# Patient Record
Sex: FEMALE | Race: BLACK OR AFRICAN AMERICAN | Employment: OTHER | URBAN - METROPOLITAN AREA
[De-identification: names, ages, dates, MRNs, and addresses within clinical notes are randomized per-mention and may not be internally consistent; named-entity substitution may affect disease eponyms.]

---

## 2017-03-27 RX ORDER — AMLODIPINE BESYLATE 10 MG/1
TABLET ORAL
Qty: 90 TAB | Refills: 1 | OUTPATIENT
Start: 2017-03-27

## 2017-03-27 RX ORDER — VALSARTAN AND HYDROCHLOROTHIAZIDE 160; 25 MG/1; MG/1
TABLET ORAL
Qty: 90 TAB | Refills: 0 | OUTPATIENT
Start: 2017-03-27

## 2017-03-27 NOTE — TELEPHONE ENCOUNTER
Requested Prescriptions     Pending Prescriptions Disp Refills    valsartan-hydroCHLOROthiazide (DIOVAN-HCT) 160-25 mg per tablet [Pharmacy Med Name: VALSARTAN/HCTZ 160MG/25MG TABLETS] 90 Tab 0     Sig: TAKE 1 TABLET BY MOUTH DAILY FOR HIGH BLOOD PRESSURE    amLODIPine (NORVASC) 10 mg tablet 90 Tab 1     Sig: TAKE 1 TABLET BY MOUTH EVERY DAY       Patient is completely out of medication

## 2017-03-29 NOTE — TELEPHONE ENCOUNTER
Received call from pt in ref to her refill request. I advised pt that I did see request, but unfortunately Dr Alexa Scott MD is out of the office today. I advised that I could send this request to another provider but couldn't guarantee that this request would be honored since last office visit with us was in May 2016 and she was due to follow up in 1 month and no showed for appt. Pt then asked if she could come in for office visit. Pt was scheduled for office visit on 3/30/17.

## 2017-03-30 ENCOUNTER — OFFICE VISIT (OUTPATIENT)
Dept: FAMILY MEDICINE CLINIC | Age: 67
End: 2017-03-30

## 2017-03-30 VITALS
DIASTOLIC BLOOD PRESSURE: 82 MMHG | TEMPERATURE: 97.2 F | HEART RATE: 85 BPM | BODY MASS INDEX: 22.88 KG/M2 | SYSTOLIC BLOOD PRESSURE: 135 MMHG | HEIGHT: 68 IN | RESPIRATION RATE: 20 BRPM | OXYGEN SATURATION: 98 % | WEIGHT: 151 LBS

## 2017-03-30 DIAGNOSIS — I10 ESSENTIAL HYPERTENSION WITH GOAL BLOOD PRESSURE LESS THAN 140/90: Chronic | ICD-10-CM

## 2017-03-30 DIAGNOSIS — Z12.39 BREAST CANCER SCREENING: ICD-10-CM

## 2017-03-30 DIAGNOSIS — I10 ESSENTIAL HYPERTENSION WITH GOAL BLOOD PRESSURE LESS THAN 140/90: Primary | Chronic | ICD-10-CM

## 2017-03-30 RX ORDER — VALSARTAN AND HYDROCHLOROTHIAZIDE 160; 25 MG/1; MG/1
1 TABLET ORAL DAILY
Qty: 90 TAB | Refills: 1 | Status: SHIPPED | OUTPATIENT
Start: 2017-03-30 | End: 2017-10-31 | Stop reason: SDUPTHER

## 2017-03-30 RX ORDER — AMLODIPINE BESYLATE 10 MG/1
TABLET ORAL
Qty: 90 TAB | Refills: 1 | Status: SHIPPED | OUTPATIENT
Start: 2017-03-30 | End: 2017-10-31 | Stop reason: SDUPTHER

## 2017-03-30 NOTE — PROGRESS NOTES
Chronic Illness Visit    Today's Date:  3/30/2017   Patient's Name: Edilson Connelly   Patient's :  1950     History:     Chief Complaint   Patient presents with    Follow-up     HTN    Medication Refill     Valsartan and Amlodipine        Edilson Connelly is a 61 y.o. female presenting for a follow up of Chronic Illness. Hypertension    BP today is at goal and less [id number]. Patients risk factors include: denies  Patient is checking BP and they range from 's. Reports compliance and denies side effects to medications  Daily exercise and diet are as follows: reports eating a low salt diet w/ lots of fruits and veggies. Drinks about 8 glasses of water daily. Weight is stable since the last visit. Takes the below meds regularly with no side effects. Not taking daily ASA 81 mg. Last LDL: unknown    Past Medical History:   Diagnosis Date    Hypertension      Past Surgical History:   Procedure Laterality Date    EXTRACTION ERUPTED TOOTH/EXR      HX  SECTION       Social History     Social History    Marital status: SINGLE     Spouse name: N/A    Number of children: N/A    Years of education: N/A     Social History Main Topics    Smoking status: Never Smoker    Smokeless tobacco: Never Used    Alcohol use No    Drug use: No    Sexual activity: No     Other Topics Concern    None     Social History Narrative     Family History   Problem Relation Age of Onset    Hypertension Mother     Hypertension Father     Hypertension Brother     Hypertension Brother     Kidney Disease Brother      No Known Allergies    Problem List:      Patient Active Problem List   Diagnosis Code    Essential hypertension with goal blood pressure less than 140/90 I10       Medications:     Current Outpatient Prescriptions   Medication Sig    amLODIPine (NORVASC) 10 mg tablet TAKE 1 TABLET BY MOUTH EVERY DAY    valsartan-hydrochlorothiazide (DIOVAN HCT) 160-25 mg per tablet Take 1 Tab by mouth daily. Indications: HYPERTENSION    clindamycin (CLEOCIN) 150 mg capsule Take  by mouth every six (6) hours.  acetaminophen-codeine (TYLENOL #3) 300-30 mg per tablet Take 1 Tab by mouth every four (4) hours as needed for Pain. No current facility-administered medications for this visit. Constitutional: negative for fevers, chills, sweats and fatigue  Respiratory: negative for cough, sputum or wheezing  Cardiovascular: negative for chest pain, chest pressure/discomfort, dyspnea  Gastrointestinal: negative for nausea, vomiting, diarrhea, constipation and abdominal pain  Musculoskeletal:negative for myalgias and arthralgias  Neurological: negative for headaches and dizziness  Behavioral/Psych: negative for anxiety and depression    Physical Assessment:   VS:    Vitals:    03/30/17 1245   BP: 135/82   Pulse: 85   Resp: 20   Temp: 97.2 °F (36.2 °C)   TempSrc: Oral   SpO2: 98%   Weight: 151 lb (68.5 kg)   Height: 5' 8\" (1.727 m)       Lab Results   Component Value Date/Time    Sodium 142 07/16/2015 02:25 PM    Potassium 3.6 07/16/2015 02:25 PM    Chloride 104 07/16/2015 02:25 PM    CO2 28 07/16/2015 02:25 PM    Anion gap 10 07/16/2015 02:25 PM    Glucose 101 07/16/2015 02:25 PM    BUN 12 07/16/2015 02:25 PM    Creatinine 0.37 07/16/2015 02:25 PM    BUN/Creatinine ratio 32 07/16/2015 02:25 PM    GFR est AA >60 07/16/2015 02:25 PM    GFR est non-AA >60 07/16/2015 02:25 PM    Calcium 9.2 07/16/2015 02:25 PM       General:   Well-groomed, well-nourished, in no distress, pleasant, alert, appropriate and conversant. Mouth:  Good dentition, oropharynx WNL without membranes, exudates, petechiae or ulcers  Cardiovasc:   RRR, no MRG. Pulses 2+ and symmetric at distal extremities. Pulmonary:   Lungs clear bilaterally. Normal respiratory effort. Extremities:   No edema, no TTP bilateral calves. LEs warm and well-perfused. Neuro:   Alert and oriented, no focal deficits. No facial asymmetry noted.   Skin:    Small patch of itchy flaky skin between the breast  Psych:  No pressured speech or abnormal thought content        Assessment/Plan & Orders:       1. Essential hypertension with goal blood pressure less than 140/90    2. Breast cancer screening        Orders Placed This Encounter    CHELA MAMMO BI SCREENING INCL CAD    LIPID PANEL    METABOLIC PANEL, COMPREHENSIVE       Hypertension BP at goal will continue with current treatment plan. CMP and lipid panel ordered.      Follow up in 6 months    Verdis Mortimer, MD  Family Medicine  3/30/2017  12:34 PM

## 2017-03-30 NOTE — MR AVS SNAPSHOT
Visit Information Date & Time Provider Department Dept. Phone Encounter #  
 3/30/2017 12:45 PM Alvena Klinefelter, [facility] [phone] [id number] Follow-up Instructions Return in about 6 months (around 9/30/2017). Your Appointments 10/2/2017  9:00 AM  
FOLLOW UP EXAM with Alvena Klinefelter, MD  
Formerly [facility]) Appt Note: 6 month f/u  
 [address] [facility] [id number]  
[facility] [id number] Upcoming Health Maintenance Date Due Hepatitis C Screening 1950 DTaP/Tdap/Td series (1 - Tdap) 6/6/1973 ZOSTER VACCINE AGE 60> 6/6/2012 BREAST CANCER SCRN MAMMOGRAM 1/1/2017 PAP AKA CERVICAL CYTOLOGY 1/1/2017 FOBT Q 1 YEAR AGE 50-75 1/25/2018 Allergies as of 3/30/2017  Review Complete On: 3/30/2017 By: Alvena Klinefelter, MD  
 No Known Allergies Current Immunizations  Never Reviewed No immunizations on file. Not reviewed this visit You Were Diagnosed With   
  
 Codes Comments Essential hypertension with goal blood pressure less than 140/90    -  Primary ICD-10-CM: I10 
ICD-9-CM: 401.9 Breast cancer screening     ICD-10-CM: Z12.39 
ICD-9-CM: V76.10 Vitals BP Pulse Temp Resp Height(growth percentile) Weight(growth percentile) 135/82 85 97.2 °F (36.2 °C) (Oral) 20 5' 8\" (1.727 m) 151 lb (68.5 kg) SpO2 BMI OB Status Smoking Status 98% 22.96 kg/m2 Postmenopausal Never Smoker Vitals History BMI and BSA Data Body Mass Index Body Surface Area  
 22.96 kg/m 2 1.81 m 2 Preferred Pharmacy Pharmacy Name Phone [address] [facility] [phone]  [facility] [phone] Your Updated Medication List  
  
   
This list is accurate as of: 3/30/17  1:10 PM.  Always use your most recent med list.  
  
  
  
  
 acetaminophen-codeine 300-30 mg per tablet Commonly known as:  TYLENOL #3 Take 1 Tab by mouth every four (4) hours as needed for Pain. amLODIPine 10 mg tablet Commonly known as:  Cyclone Citron TAKE 1 TABLET BY MOUTH EVERY DAY  
  
 clindamycin 150 mg capsule Commonly known as:  CLEOCIN Take  by mouth every six (6) hours. valsartan-hydroCHLOROthiazide 160-25 mg per tablet Commonly known as:  DIOVAN HCT Take 1 Tab by mouth daily. Indications: HYPERTENSION Follow-up Instructions Return in about 6 months (around 9/30/2017). To-Do List   
 03/30/2017 Lab:  LIPID PANEL   
  
 03/30/2017 Imaging:  CHELA MAMMO BI SCREENING INCL CAD   
  
 03/30/2017 Lab:  METABOLIC PANEL, COMPREHENSIVE Patient Instructions High Blood Pressure: Care Instructions Your Care Instructions If your blood pressure is usually above 140/90, you have high blood pressure, or hypertension. That means the top number is 140 or higher or the bottom number is 90 or higher, or both. Despite what a lot of people think, high blood pressure usually doesn't cause headaches or make you feel dizzy or lightheaded. It usually has no symptoms. But it does increase your risk for heart attack, stroke, and kidney or eye damage. The higher your blood pressure, the more your risk increases. Your doctor will give you a goal for your blood pressure. Your goal will be based on your health and your age. An example of a goal is to keep your blood pressure below 140/90. Lifestyle changes, such as eating healthy and being active, are always important to help lower blood pressure. You might also take medicine to reach your blood pressure goal. 
Follow-up care is a key part of your treatment and safety. Be sure to make and go to all appointments, and call your doctor if you are having problems. It's also a good idea to know your test results and keep a list of the medicines you take. How can you care for yourself at home? Medical treatment · If you stop taking your medicine, your blood pressure will go back up. You may take one or more types of medicine to lower your blood pressure. Be safe with medicines. Take your medicine exactly as prescribed. Call your doctor if you think you are having a problem with your medicine. · Talk to your doctor before you start taking aspirin every day. Aspirin can help certain people lower their risk of a heart attack or stroke. But taking aspirin isn't right for everyone, because it can cause serious bleeding. · See your doctor regularly. You may need to see the doctor more often at first or until your blood pressure comes down. · If you are taking blood pressure medicine, talk to your doctor before you take decongestants or anti-inflammatory medicine, such as ibuprofen. Some of these medicines can raise blood pressure. · Learn how to check your blood pressure at home. Lifestyle changes · Stay at a healthy weight. This is especially important if you put on weight around the waist. Losing even 10 pounds can help you lower your blood pressure. · If your doctor recommends it, get more exercise. Walking is a good choice. Bit by bit, increase the amount you walk every day. Try for at least 30 minutes on most days of the week. You also may want to swim, bike, or do other activities. · Avoid or limit alcohol. Talk to your doctor about whether you can drink any alcohol. · Try to limit how much sodium you eat to less than 2,300 milligrams (mg) a day. Your doctor may ask you to try to eat less than 1,500 mg a day. · Eat plenty of fruits (such as bananas and oranges), vegetables, legumes, whole grains, and low-fat dairy products. · Lower the amount of saturated fat in your diet. Saturated fat is found in animal products such as milk, cheese, and meat. Limiting these foods may help you lose weight and also lower your risk for heart disease. · Do not smoke. Smoking increases your risk for heart attack and stroke. If you need help quitting, talk to your doctor about stop-smoking programs and medicines. These can increase your chances of quitting for good. When should you call for help? Call 911 anytime you think you may need emergency care. This may mean having symptoms that suggest that your blood pressure is causing a serious heart or blood vessel problem. Your blood pressure may be over 180/110. For example, call 911 if: 
· You have symptoms of a heart attack. These may include: ¨ Chest pain or pressure, or a strange feeling in the chest. 
¨ Sweating. ¨ Shortness of breath. ¨ Nausea or vomiting. ¨ Pain, pressure, or a strange feeling in the back, neck, jaw, or upper belly or in one or both shoulders or arms. ¨ Lightheadedness or sudden weakness. ¨ A fast or irregular heartbeat. · You have symptoms of a stroke. These may include: 
¨ Sudden numbness, tingling, weakness, or loss of movement in your face, arm, or leg, especially on only one side of your body. ¨ Sudden vision changes. ¨ Sudden trouble speaking. ¨ Sudden confusion or trouble understanding simple statements. ¨ Sudden problems with walking or balance. ¨ A sudden, severe headache that is different from past headaches. · You have severe back or belly pain. Do not wait until your blood pressure comes down on its own. Get help right away. Call your doctor now or seek immediate care if: 
· Your blood pressure is much higher than normal (such as 180/110 or higher), but you don't have symptoms. · You think high blood pressure is causing symptoms, such as: ¨ Severe headache. ¨ Blurry vision. Watch closely for changes in your health, and be sure to contact your doctor if: 
· Your blood pressure measures 140/90 or higher at least 2 times. That means the top number is 140 or higher or the bottom number is 90 or higher, or both. · You think you may be having side effects from your blood pressure medicine. · Your blood pressure is usually normal, but it goes above normal at least 2 times. Where can you learn more? Go to http://hugo-pee.info/. Enter S714 in the search box to learn more about \"High Blood Pressure: Care Instructions. \" Current as of: August 8, 2016 Content Version: 11.2 © 3473-9958 [employer]. Care instructions adapted under license by [employer] (which disclaims liability or warranty for this information). If you have questions about a medical condition or this instruction, always ask your healthcare professional. [address] any warranty or liability for your use of this information. Introducing [facility]! Padmini Carroll introduces Rayspan patient portal. Now you can access parts of your medical record, email your doctor's office, and request medication refills online. 1. In your internet browser, go to [email or web address]. [employer]/[facility] 2. Click on the First Time User? Click Here link in the Sign In box. You will see the New Member Sign Up page. 3. Enter your Rayspan Access Code exactly as it appears below. You will not need to use this code after youve completed the sign-up process. If you do not sign up before the expiration date, you must request a new code. · Rayspan Access Code: [email or web address] Expires: 6/28/2017 12:28 PM 
 
4. Enter the last four digits of your Social Security Number (xxxx) and Date of Birth (mm/dd/yyyy) as indicated and click Submit. You will be taken to the next sign-up page. 5. Create a Rayspan ID. This will be your Rayspan login ID and cannot be changed, so think of one that is secure and easy to remember. 6. Create a Rayspan password. You can change your password at any time. 7. Enter your Password Reset Question and Answer. This can be used at a later time if you forget your password. 8. Enter your e-mail address. You will receive e-mail notification when new information is available in [address] 9. Click Sign Up. You can now view and download portions of your medical record. 10. Click the Download Summary menu link to download a portable copy of your medical information. If you have questions, please visit the Frequently Asked Questions section of the [employer] website. Remember, [employer] Ring is NOT to be used for urgent needs. For medical emergencies, dial 911. Now available from your iPhone and Android! Please provide this summary of care documentation to your next provider. Your primary care clinician is listed as Kaylyn Murillo. If you have any questions after today's visit, please call [phone].

## 2017-03-30 NOTE — PATIENT INSTRUCTIONS

## 2017-04-04 ENCOUNTER — HOSPITAL ENCOUNTER (OUTPATIENT)
Dept: MAMMOGRAPHY | Age: 67
Discharge: HOME | End: 2017-04-04
Attending: FAMILY MEDICINE
Payer: COMMERCIAL

## 2017-04-04 DIAGNOSIS — Z12.39 BREAST CANCER SCREENING: ICD-10-CM

## 2017-04-04 PROCEDURE — 77067 SCR MAMMO BI INCL CAD: CPT

## 2017-04-06 DIAGNOSIS — N63.20 LEFT BREAST MASS: Primary | ICD-10-CM

## 2017-04-07 ENCOUNTER — HOSPITAL ENCOUNTER (OUTPATIENT)
Dept: LAB | Age: 67
Discharge: HOME | End: 2017-04-07

## 2017-04-07 LAB
A-G RATIO,AGRAT: 1.4 RATIO (ref 1.1–2.6)
ALBUMIN SERPL-MCNC: 4.5 G/DL (ref 3.5–5)
ALP SERPL-CCNC: 94 U/L (ref 40–120)
ALT SERPL-CCNC: 25 U/L (ref 5–40)
ANION GAP SERPL CALC-SCNC: 16 MMOL/L
AST SERPL W P-5'-P-CCNC: 17 U/L (ref 10–37)
BILIRUB SERPL-MCNC: 0.7 MG/DL (ref 0.2–1.2)
BUN SERPL-MCNC: 16 MG/DL (ref 6–22)
CALCIUM SERPL-MCNC: 10.1 MG/DL (ref 8.4–10.5)
CHLORIDE SERPL-SCNC: 101 MMOL/L (ref 98–110)
CHOLEST SERPL-MCNC: 348 MG/DL (ref 110–200)
CO2 SERPL-SCNC: 26 MMOL/L (ref 20–32)
CREAT SERPL-MCNC: 0.7 MG/DL (ref 0.8–1.4)
GFRAA, 66117: >60
GFRNA, 66118: >60
GLOBULIN,GLOB: 3.3 G/DL (ref 2–4)
GLUCOSE SERPL-MCNC: 97 MG/DL (ref 65–99)
HDLC SERPL-MCNC: 70 MG/DL (ref 40–59)
LDLC SERPL CALC-MCNC: 261 MG/DL (ref 50–99)
POTASSIUM SERPL-SCNC: 4.8 MMOL/L (ref 3.5–5.5)
PROT SERPL-MCNC: 7.8 G/DL (ref 6.2–8.1)
SENTARA SPECIMEN COL,SENBCF: NORMAL
SODIUM SERPL-SCNC: 143 MMOL/L (ref 133–145)
TRIGL SERPL-MCNC: 85 MG/DL (ref 40–149)
VLDLC SERPL CALC-MCNC: 17 MG/DL (ref 8–30)

## 2017-04-07 PROCEDURE — 99001 SPECIMEN HANDLING PT-LAB: CPT | Performed by: FAMILY MEDICINE

## 2017-04-10 DIAGNOSIS — E78.5 HYPERLIPIDEMIA, UNSPECIFIED HYPERLIPIDEMIA TYPE: Primary | ICD-10-CM

## 2017-04-10 RX ORDER — ATORVASTATIN CALCIUM 20 MG/1
20 TABLET, FILM COATED ORAL DAILY
Qty: 30 TAB | Refills: 5 | Status: SHIPPED | OUTPATIENT
Start: 2017-04-10 | End: 2017-10-31 | Stop reason: SDUPTHER

## 2017-04-11 ENCOUNTER — HOSPITAL ENCOUNTER (OUTPATIENT)
Dept: ULTRASOUND IMAGING | Age: 67
Discharge: HOME | End: 2017-04-11
Attending: FAMILY MEDICINE
Payer: COMMERCIAL

## 2017-04-11 ENCOUNTER — HOSPITAL ENCOUNTER (OUTPATIENT)
Dept: MAMMOGRAPHY | Age: 67
Discharge: HOME | End: 2017-04-11
Attending: FAMILY MEDICINE
Payer: COMMERCIAL

## 2017-04-11 DIAGNOSIS — N63.20 LEFT BREAST MASS: ICD-10-CM

## 2017-04-11 DIAGNOSIS — N63.0 BREAST MASS: ICD-10-CM

## 2017-04-11 PROCEDURE — 76642 ULTRASOUND BREAST LIMITED: CPT

## 2017-04-11 PROCEDURE — 77065 DX MAMMO INCL CAD UNI: CPT

## 2017-10-31 ENCOUNTER — OFFICE VISIT (OUTPATIENT)
Dept: FAMILY MEDICINE CLINIC | Age: 67
End: 2017-10-31

## 2017-10-31 ENCOUNTER — HOSPITAL ENCOUNTER (OUTPATIENT)
Dept: GENERAL RADIOLOGY | Age: 67
Discharge: HOME | End: 2017-10-31
Payer: MEDICARE

## 2017-10-31 ENCOUNTER — TELEPHONE (OUTPATIENT)
Dept: FAMILY MEDICINE CLINIC | Age: 67
End: 2017-10-31

## 2017-10-31 ENCOUNTER — HOSPITAL ENCOUNTER (OUTPATIENT)
Dept: LAB | Age: 67
Discharge: HOME | End: 2017-10-31
Payer: MEDICARE

## 2017-10-31 VITALS
SYSTOLIC BLOOD PRESSURE: 135 MMHG | TEMPERATURE: 97.4 F | HEIGHT: 68 IN | OXYGEN SATURATION: 100 % | RESPIRATION RATE: 18 BRPM | HEART RATE: 80 BPM | WEIGHT: 155.5 LBS | BODY MASS INDEX: 23.57 KG/M2 | DIASTOLIC BLOOD PRESSURE: 80 MMHG

## 2017-10-31 DIAGNOSIS — M54.50 CHRONIC BILATERAL LOW BACK PAIN WITHOUT SCIATICA: ICD-10-CM

## 2017-10-31 DIAGNOSIS — G89.29 CHRONIC BILATERAL LOW BACK PAIN WITHOUT SCIATICA: ICD-10-CM

## 2017-10-31 DIAGNOSIS — G25.81 RLS (RESTLESS LEGS SYNDROME): ICD-10-CM

## 2017-10-31 DIAGNOSIS — E78.5 HYPERLIPIDEMIA, UNSPECIFIED HYPERLIPIDEMIA TYPE: ICD-10-CM

## 2017-10-31 DIAGNOSIS — G25.81 RLS (RESTLESS LEGS SYNDROME): Primary | ICD-10-CM

## 2017-10-31 DIAGNOSIS — I10 ESSENTIAL HYPERTENSION WITH GOAL BLOOD PRESSURE LESS THAN 140/90: Chronic | ICD-10-CM

## 2017-10-31 DIAGNOSIS — I10 ESSENTIAL HYPERTENSION WITH GOAL BLOOD PRESSURE LESS THAN 140/90: Primary | Chronic | ICD-10-CM

## 2017-10-31 LAB
ALBUMIN SERPL-MCNC: 4.3 G/DL (ref 3.4–5)
ALBUMIN/GLOB SERPL: 1.1 {RATIO} (ref 0.8–1.7)
ALP SERPL-CCNC: 108 U/L (ref 45–117)
ALT SERPL-CCNC: 40 U/L (ref 13–56)
ANION GAP SERPL CALC-SCNC: 6 MMOL/L (ref 3–18)
AST SERPL-CCNC: 20 U/L (ref 15–37)
BILIRUB SERPL-MCNC: 0.5 MG/DL (ref 0.2–1)
BUN SERPL-MCNC: 13 MG/DL (ref 7–18)
BUN/CREAT SERPL: 17 (ref 12–20)
CALCIUM SERPL-MCNC: 9.7 MG/DL (ref 8.5–10.1)
CHLORIDE SERPL-SCNC: 101 MMOL/L (ref 100–108)
CHOLEST SERPL-MCNC: 342 MG/DL
CO2 SERPL-SCNC: 29 MMOL/L (ref 21–32)
CREAT SERPL-MCNC: 0.75 MG/DL (ref 0.6–1.3)
ERYTHROCYTE [DISTWIDTH] IN BLOOD BY AUTOMATED COUNT: 13.5 % (ref 11.6–14.5)
GLOBULIN SER CALC-MCNC: 3.9 G/DL (ref 2–4)
GLUCOSE SERPL-MCNC: 100 MG/DL (ref 74–99)
HCT VFR BLD AUTO: 37.6 % (ref 35–45)
HDLC SERPL-MCNC: 85 MG/DL (ref 40–60)
HDLC SERPL: 4 {RATIO} (ref 0–5)
HGB BLD-MCNC: 12.6 G/DL (ref 12–16)
LDLC SERPL CALC-MCNC: 238 MG/DL (ref 0–100)
LIPID PROFILE,FLP: ABNORMAL
MCH RBC QN AUTO: 30.6 PG (ref 24–34)
MCHC RBC AUTO-ENTMCNC: 33.5 G/DL (ref 31–37)
MCV RBC AUTO: 91.3 FL (ref 74–97)
PLATELET # BLD AUTO: 194 K/UL (ref 135–420)
PMV BLD AUTO: 11.9 FL (ref 9.2–11.8)
POTASSIUM SERPL-SCNC: 4.4 MMOL/L (ref 3.5–5.5)
PROT SERPL-MCNC: 8.2 G/DL (ref 6.4–8.2)
RBC # BLD AUTO: 4.12 M/UL (ref 4.2–5.3)
SODIUM SERPL-SCNC: 136 MMOL/L (ref 136–145)
TRIGL SERPL-MCNC: 95 MG/DL (ref ?–150)
VLDLC SERPL CALC-MCNC: 19 MG/DL
WBC # BLD AUTO: 6.2 K/UL (ref 4.6–13.2)

## 2017-10-31 PROCEDURE — 80061 LIPID PANEL: CPT | Performed by: FAMILY MEDICINE

## 2017-10-31 PROCEDURE — 36415 COLL VENOUS BLD VENIPUNCTURE: CPT | Performed by: FAMILY MEDICINE

## 2017-10-31 PROCEDURE — 80053 COMPREHEN METABOLIC PANEL: CPT | Performed by: FAMILY MEDICINE

## 2017-10-31 PROCEDURE — 85027 COMPLETE CBC AUTOMATED: CPT | Performed by: FAMILY MEDICINE

## 2017-10-31 PROCEDURE — 72100 X-RAY EXAM L-S SPINE 2/3 VWS: CPT

## 2017-10-31 RX ORDER — PRAMIPEXOLE 0.38 MG/1
1 TABLET, EXTENDED RELEASE ORAL DAILY
Qty: 30 TAB | Refills: 5 | Status: SHIPPED | OUTPATIENT
Start: 2017-10-31 | End: 2017-11-02

## 2017-10-31 RX ORDER — VALSARTAN AND HYDROCHLOROTHIAZIDE 160; 25 MG/1; MG/1
1 TABLET ORAL DAILY
Qty: 90 TAB | Refills: 1 | Status: SHIPPED | OUTPATIENT
Start: 2017-10-31 | End: 2018-04-30 | Stop reason: SDUPTHER

## 2017-10-31 RX ORDER — AMLODIPINE BESYLATE 10 MG/1
TABLET ORAL
Qty: 90 TAB | Refills: 1 | Status: SHIPPED | OUTPATIENT
Start: 2017-10-31 | End: 2018-06-12 | Stop reason: DRUGHIGH

## 2017-10-31 RX ORDER — ATORVASTATIN CALCIUM 20 MG/1
20 TABLET, FILM COATED ORAL DAILY
Qty: 30 TAB | Refills: 5 | Status: SHIPPED | OUTPATIENT
Start: 2017-10-31 | End: 2018-09-05

## 2017-10-31 NOTE — PATIENT INSTRUCTIONS
High Blood Pressure: Care Instructions  Your Care Instructions    If your blood pressure is usually above 140/90, you have high blood pressure, or hypertension. That means the top number is 140 or higher or the bottom number is 90 or higher, or both. Despite what a lot of people think, high blood pressure usually doesn't cause headaches or make you feel dizzy or lightheaded. It usually has no symptoms. But it does increase your risk for heart attack, stroke, and kidney or eye damage. The higher your blood pressure, the more your risk increases. Your doctor will give you a goal for your blood pressure. Your goal will be based on your health and your age. An example of a goal is to keep your blood pressure below 140/90. Lifestyle changes, such as eating healthy and being active, are always important to help lower blood pressure. You might also take medicine to reach your blood pressure goal.  Follow-up care is a key part of your treatment and safety. Be sure to make and go to all appointments, and call your doctor if you are having problems. It's also a good idea to know your test results and keep a list of the medicines you take. How can you care for yourself at home? Medical treatment  · If you stop taking your medicine, your blood pressure will go back up. You may take one or more types of medicine to lower your blood pressure. Be safe with medicines. Take your medicine exactly as prescribed. Call your doctor if you think you are having a problem with your medicine. · Talk to your doctor before you start taking aspirin every day. Aspirin can help certain people lower their risk of a heart attack or stroke. But taking aspirin isn't right for everyone, because it can cause serious bleeding. · See your doctor regularly. You may need to see the doctor more often at first or until your blood pressure comes down.   · If you are taking blood pressure medicine, talk to your doctor before you take decongestants or anti-inflammatory medicine, such as ibuprofen. Some of these medicines can raise blood pressure. · Learn how to check your blood pressure at home. Lifestyle changes  · Stay at a healthy weight. This is especially important if you put on weight around the waist. Losing even 10 pounds can help you lower your blood pressure. · If your doctor recommends it, get more exercise. Walking is a good choice. Bit by bit, increase the amount you walk every day. Try for at least 30 minutes on most days of the week. You also may want to swim, bike, or do other activities. · Avoid or limit alcohol. Talk to your doctor about whether you can drink any alcohol. · Try to limit how much sodium you eat to less than 2,300 milligrams (mg) a day. Your doctor may ask you to try to eat less than 1,500 mg a day. · Eat plenty of fruits (such as bananas and oranges), vegetables, legumes, whole grains, and low-fat dairy products. · Lower the amount of saturated fat in your diet. Saturated fat is found in animal products such as milk, cheese, and meat. Limiting these foods may help you lose weight and also lower your risk for heart disease. · Do not smoke. Smoking increases your risk for heart attack and stroke. If you need help quitting, talk to your doctor about stop-smoking programs and medicines. These can increase your chances of quitting for good. When should you call for help? Call 911 anytime you think you may need emergency care. This may mean having symptoms that suggest that your blood pressure is causing a serious heart or blood vessel problem. Your blood pressure may be over 180/110. ? For example, call 911 if:  ? · You have symptoms of a heart attack. These may include:  ¨ Chest pain or pressure, or a strange feeling in the chest.  ¨ Sweating. ¨ Shortness of breath. ¨ Nausea or vomiting.   ¨ Pain, pressure, or a strange feeling in the back, neck, jaw, or upper belly or in one or both shoulders or arms.  ¨ Lightheadedness or sudden weakness. ¨ A fast or irregular heartbeat. ? · You have symptoms of a stroke. These may include:  ¨ Sudden numbness, tingling, weakness, or loss of movement in your face, arm, or leg, especially on only one side of your body. ¨ Sudden vision changes. ¨ Sudden trouble speaking. ¨ Sudden confusion or trouble understanding simple statements. ¨ Sudden problems with walking or balance. ¨ A sudden, severe headache that is different from past headaches. ? · You have severe back or belly pain. ?Do not wait until your blood pressure comes down on its own. Get help right away. ?Call your doctor now or seek immediate care if:  ? · Your blood pressure is much higher than normal (such as 180/110 or higher), but you don't have symptoms. ? · You think high blood pressure is causing symptoms, such as:  ¨ Severe headache. ¨ Blurry vision. ? Watch closely for changes in your health, and be sure to contact your doctor if:  ? · Your blood pressure measures 140/90 or higher at least 2 times. That means the top number is 140 or higher or the bottom number is 90 or higher, or both. ? · You think you may be having side effects from your blood pressure medicine. ? · Your blood pressure is usually normal, but it goes above normal at least 2 times. Where can you learn more? Go to http://hugo-pee.info/. Enter Y397 in the search box to learn more about \"High Blood Pressure: Care Instructions. \"  Current as of: September 21, 2016  Content Version: 11.4  © 9184-3200 [employer]. Care instructions adapted under license by [employer] (which disclaims liability or warranty for this information). If you have questions about a medical condition or this instruction, always ask your healthcare professional. [place] [address] any warranty or liability for your use of this information.

## 2017-10-31 NOTE — PROGRESS NOTES
Chronic Illness Visit    Today's Date:  10/31/2017   Patient's Name: Emy Miller   Patient's :  1950     History:     Chief Complaint   Patient presents with    Follow Up Chronic Condition     HTN, Doing good    Leg Pain     PT reports of legs feeling heavy and feels like she is leaning forward while standing still no leg pain       Emy Miller is a 74 y.o. female presenting for a follow up of Chronic Illness. Hypertension/Hyperlipidemia    BP today is at goal and less [id number]. Patients risk factors include: denies  Patient is checking BP and they range from 's. Reports compliance and denies side effects to medications  Daily exercise and diet are as follows: reports eating a low salt diet w/ lots of fruits and veggies. Drinks about 8 glasses of water daily. Weight gain of 5 lbs since the last visit. Takes the below meds regularly with no side effects. Not taking daily ASA 81 mg. Last LDL: 261    Lower Limb Pain    Onset: for the past few months has noticed lower limb pain/weakness. Patient reports that she kicks and moves a lot in her sleep her  complains.    Denies history of trauma/injury  Character of Pain: weakness  Denies problem with bowels or urine  Alleviating/Agravating Factors: worse with long days of standing  Current meds: none    Past Medical History:   Diagnosis Date    Hypertension      Past Surgical History:   Procedure Laterality Date    EXTRACTION ERUPTED TOOTH/EXR      HX  SECTION       Social History     Social History    Marital status: SINGLE     Spouse name: N/A    Number of children: N/A    Years of education: N/A     Social History Main Topics    Smoking status: Never Smoker    Smokeless tobacco: Never Used    Alcohol use No    Drug use: No    Sexual activity: Yes     Partners: Male     Birth control/ protection: None     Other Topics Concern    None     Social History Narrative     Family History   Problem Relation Age of Onset    Hypertension Mother     Hypertension Father     Hypertension Brother     Hypertension Brother     Kidney Disease Brother      No Known Allergies    Problem List:      Patient Active Problem List   Diagnosis Code    Essential hypertension with goal blood pressure less than 140/90 I10       Medications:     Current Outpatient Prescriptions   Medication Sig    atorvastatin (LIPITOR) 20 mg tablet Take 1 Tab by mouth daily.  valsartan-hydroCHLOROthiazide (DIOVAN HCT) 160-25 mg per tablet Take 1 Tab by mouth daily. Indications: hypertension    amLODIPine (NORVASC) 10 mg tablet TAKE 1 TABLET BY MOUTH EVERY DAY    clindamycin (CLEOCIN) 150 mg capsule Take  by mouth every six (6) hours.  acetaminophen-codeine (TYLENOL #3) 300-30 mg per tablet Take 1 Tab by mouth every four (4) hours as needed for Pain. No current facility-administered medications for this visit.           Constitutional: negative for fevers, chills, sweats and fatigue  Respiratory: negative for cough, sputum or wheezing  Cardiovascular: negative for chest pain, chest pressure/discomfort, dyspnea  Gastrointestinal: negative for nausea, vomiting, diarrhea, constipation and abdominal pain  Musculoskeletal:negative for myalgias and arthralgias  Neurological: negative for headaches and dizziness  Behavioral/Psych: negative for anxiety and depression    Physical Assessment:   VS:    Vitals:    10/31/17 0906 10/31/17 0908   BP: 156/89 135/80   Pulse: 83 80   Resp: 18 18   Temp: 97.4 °F (36.3 °C)    TempSrc: Oral    SpO2: 99% 100%   Weight: 155 lb 8 oz (70.5 kg)    Height: 5' 8\" (1.727 m)        Lab Results   Component Value Date/Time    Sodium 143 04/07/2017 02:00 PM    Potassium 4.8 04/07/2017 02:00 PM    Chloride 101 04/07/2017 02:00 PM    CO2 26 04/07/2017 02:00 PM    Anion gap 16.0 04/07/2017 02:00 PM    Glucose 97 04/07/2017 02:00 PM    BUN 16 04/07/2017 02:00 PM    Creatinine 0.7 04/07/2017 02:00 PM    BUN/Creatinine ratio 32 07/16/2015 02:25 PM    GFR est AA >60 07/16/2015 02:25 PM    GFR est non-AA >60 07/16/2015 02:25 PM    Calcium 10.1 04/07/2017 02:00 PM       General:   Well-groomed, well-nourished, in no distress, pleasant, alert, appropriate and conversant. Mouth:  Good dentition, oropharynx WNL without membranes, exudates, petechiae or ulcers  Cardiovasc:   RRR, no MRG. Pulses 2+ and symmetric at distal extremities. Pulmonary:   Lungs clear bilaterally. Normal respiratory effort. Extremities:   No edema, no TTP bilateral calves. LEs warm and well-perfused. Neuro:   Alert and oriented, no focal deficits. No facial asymmetry noted. Skin:    Small patch of itchy flaky skin between the breast  Psych:  No pressured speech or abnormal thought content        Assessment/Plan & Orders:       1. Essential hypertension with goal blood pressure less than 140/90    2. Hyperlipidemia, unspecified hyperlipidemia type    3. Chronic bilateral low back pain without sciatica    4. RLS (restless legs syndrome)        Orders Placed This Encounter    XR SPINE LUMB 2 OR 3 V    LIPID PANEL    METABOLIC PANEL, COMPREHENSIVE    CBC W/O DIFF    atorvastatin (LIPITOR) 20 mg tablet    valsartan-hydroCHLOROthiazide (DIOVAN HCT) 160-25 mg per tablet    amLODIPine (NORVASC) 10 mg tablet       Hypertension/Hyperlipidemia BP at goal will continue with current treatment plan. CMP and lipid panel ordered. Bilateral Leg Pain possible RLS will check CBC and get Lumbar Xray. If no specific findings we will do a trial or requip.      Follow up in 6 months    Garry Bailey MD  Family Medicine  10/31/2017  9:17 AM

## 2017-11-02 ENCOUNTER — TELEPHONE (OUTPATIENT)
Dept: FAMILY MEDICINE CLINIC | Age: 67
End: 2017-11-02

## 2017-11-02 DIAGNOSIS — G25.81 RLS (RESTLESS LEGS SYNDROME): Primary | ICD-10-CM

## 2017-11-02 RX ORDER — ROPINIROLE 0.25 MG/1
0.25 TABLET, FILM COATED ORAL 3 TIMES DAILY
Qty: 90 TAB | Refills: 3 | Status: SHIPPED | OUTPATIENT
Start: 2017-11-02 | End: 2018-06-12

## 2017-11-02 NOTE — TELEPHONE ENCOUNTER
Patient called stating she received a call from the nurse saying her xrays were ok but she did not get her lab results.   Please call her at [phone] for her lab results    Thanks

## 2017-11-02 NOTE — TELEPHONE ENCOUNTER
Return call made tot hept using two identifiers, name and . Noted her request for her xray results. Pt given xray and labs results as stated in the letter sent to her from Dr. Nazario Sousa. Pt also made aware to increase her Lipitor to 40 mg daily due to elevated cholesterol LDL levels. Pt verbalized understanding.

## 2018-04-05 ENCOUNTER — HOSPITAL ENCOUNTER (OUTPATIENT)
Dept: MAMMOGRAPHY | Age: 68
Discharge: HOME | End: 2018-04-05
Attending: FAMILY MEDICINE
Payer: MEDICARE

## 2018-04-05 DIAGNOSIS — Z12.31 VISIT FOR SCREENING MAMMOGRAM: ICD-10-CM

## 2018-04-05 PROCEDURE — 77063 BREAST TOMOSYNTHESIS BI: CPT

## 2018-04-06 ENCOUNTER — TELEPHONE (OUTPATIENT)
Dept: FAMILY MEDICINE CLINIC | Age: 68
End: 2018-04-06

## 2018-04-06 NOTE — TELEPHONE ENCOUNTER
Can you please inform the patient her Mammogram was negative for malignancy? I cannot make a comment on the order itself because it was ordered by Dr. Angela Cruz.

## 2018-04-06 NOTE — TELEPHONE ENCOUNTER
I called and spoke with pt about results pt verbalized understanding and would like a copy of results mailed to her. A copy will be mailed out to pt.

## 2018-04-20 ENCOUNTER — OFFICE VISIT (OUTPATIENT)
Dept: FAMILY MEDICINE CLINIC | Age: 68
End: 2018-04-20

## 2018-04-20 VITALS
RESPIRATION RATE: 20 BRPM | SYSTOLIC BLOOD PRESSURE: 158 MMHG | WEIGHT: 161 LBS | DIASTOLIC BLOOD PRESSURE: 88 MMHG | HEART RATE: 74 BPM | HEIGHT: 68 IN | BODY MASS INDEX: 24.4 KG/M2 | TEMPERATURE: 98 F | OXYGEN SATURATION: 100 %

## 2018-04-20 DIAGNOSIS — M89.9 DISORDER OF BONE AND CARTILAGE: ICD-10-CM

## 2018-04-20 DIAGNOSIS — R26.9 GAIT DISTURBANCE: ICD-10-CM

## 2018-04-20 DIAGNOSIS — Z11.59 NEED FOR HEPATITIS C SCREENING TEST: ICD-10-CM

## 2018-04-20 DIAGNOSIS — R25.1 TREMOR OF LEFT HAND: ICD-10-CM

## 2018-04-20 DIAGNOSIS — M94.9 DISORDER OF BONE AND CARTILAGE: ICD-10-CM

## 2018-04-20 DIAGNOSIS — Z00.00 WELCOME TO MEDICARE PREVENTIVE VISIT: Primary | ICD-10-CM

## 2018-04-20 DIAGNOSIS — E78.5 HYPERLIPIDEMIA, UNSPECIFIED HYPERLIPIDEMIA TYPE: ICD-10-CM

## 2018-04-20 DIAGNOSIS — Z12.11 SCREENING FOR COLON CANCER: ICD-10-CM

## 2018-04-20 DIAGNOSIS — G89.29 HEEL PAIN, CHRONIC, LEFT: ICD-10-CM

## 2018-04-20 DIAGNOSIS — R68.89 FORGETFULNESS: ICD-10-CM

## 2018-04-20 DIAGNOSIS — I10 ESSENTIAL HYPERTENSION WITH GOAL BLOOD PRESSURE LESS THAN 140/90: Chronic | ICD-10-CM

## 2018-04-20 DIAGNOSIS — M79.672 HEEL PAIN, CHRONIC, LEFT: ICD-10-CM

## 2018-04-20 DIAGNOSIS — Z71.89 ACP (ADVANCE CARE PLANNING): ICD-10-CM

## 2018-04-20 NOTE — ACP (ADVANCE CARE PLANNING)
Advance Care Planning (ACP) Provider Note - Comprehensive     Date of ACP Conversation: 04/20/18  Persons included in Conversation:  patient  Length of ACP Conversation in minutes:  16 minutes    Authorized Decision Maker (if patient is incapable of making informed decisions):    This person is:  Healthcare Agent/Medical Power of  under Advance Directive          General ACP for ALL Patients with Decision Making Capacity:   Importance of advance care planning, including choosing a healthcare agent to communicate patient's healthcare decisions if patient lost the ability to make decisions, such as after a sudden illness or accident  Understanding of the healthcare agent role was assessed and information provided    For Serious or Chronic Illness:  No known illness    Interventions Provided:  Recommended completion of Advance Directive form after review of ACP materials and conversation with prospective healthcare agent   Recommended communicating the plan and making copies for the healthcare agent, personal physician, and others as appropriate (e.g., health system)

## 2018-04-20 NOTE — PROGRESS NOTES
HISTORY OF PRESENT ILLNESS  Lupe Rodriguez is a 74 y.o. female. HPI  Lupe Rodriguez is a 74 y.o. female who presents to the office today for HTN and HLD. She comes in for 6 month follow up. HTN- She admits to compliance with medication, norvasc and diovan HCT. It is not controlled today in the office. She monitors this at home and says her BP is typically 142/86. HLD- she takes lipitor 20mg nightly. Last lipid panel is below  Lab Results   Component Value Date/Time    Cholesterol, total 342 (H) 10/31/2017 12:21 PM    HDL Cholesterol 85 (H) 10/31/2017 12:21 PM    LDL, calculated 238 (H) 10/31/2017 12:21 PM    VLDL, calculated 19 10/31/2017 12:21 PM    Triglyceride 95 10/31/2017 12:21 PM    CHOL/HDL Ratio 4.0 10/31/2017 12:21 PM     She also has several complaints that have been going on for the last 2 months. She has a tremor of her left hand. Also, she is noticing she will forget what she is talking about mid conversation. She has a slower gait and when she stands up from sitting, she feels she may stumble forward. Denies hx of stroke, MI, headache, visual disturbance. Chief Complaint   Patient presents with    Hypertension     follow up     Foot Pain     referral to podiatry     Tremors     hand tremors left hand    No Known Allergies   Current Outpatient Prescriptions   Medication Sig    rOPINIRole (REQUIP) 0.25 mg tablet Take 1 Tab by mouth three (3) times daily.  atorvastatin (LIPITOR) 20 mg tablet Take 1 Tab by mouth daily.  valsartan-hydroCHLOROthiazide (DIOVAN HCT) 160-25 mg per tablet Take 1 Tab by mouth daily. Indications: hypertension    amLODIPine (NORVASC) 10 mg tablet TAKE 1 TABLET BY MOUTH EVERY DAY     No current facility-administered medications for this visit.         Past Medical History:   Diagnosis Date    Hypertension 1990     History   Smoking Status    Never Smoker   Smokeless Tobacco    Never Used     History   Alcohol Use No     Family History   Problem Relation Age of Onset    Hypertension Mother     Hypertension Father     Hypertension Brother     Hypertension Brother     Kidney Disease Brother      Review of Systems   Constitutional: Negative for diaphoresis, fever, malaise/fatigue and weight loss. Eyes: Negative for blurred vision and double vision. Respiratory: Negative for cough and shortness of breath. Cardiovascular: Negative for chest pain, palpitations and leg swelling. Gastrointestinal: Negative for abdominal pain, constipation, diarrhea, nausea and vomiting. Musculoskeletal: Negative for falls. Neurological: Positive for tremors. Negative for dizziness, speech change, focal weakness, loss of consciousness and headaches. Endo/Heme/Allergies: Does not bruise/bleed easily. Visit Vitals    /88    Pulse 74    Temp 98 °F (36.7 °C) (Oral)    Resp 20    Ht 5' 8\" (1.727 m)    Wt 161 lb (73 kg)    SpO2 100%    BMI 24.48 kg/m2     Physical Exam   Constitutional: She is oriented to person, place, and time. She appears well-developed and well-nourished. No distress. Neck: Neck supple. Carotid bruit is not present. No thyromegaly present. Cardiovascular: Normal rate, regular rhythm and normal heart sounds. Pulmonary/Chest: Effort normal and breath sounds normal. No respiratory distress. She has no wheezes. Musculoskeletal: She exhibits no edema. Lymphadenopathy:     She has no cervical adenopathy. Neurological: She is alert and oriented to person, place, and time. She has normal strength. She displays tremor. Tremor affecting left UE > right UE  Slight tremor of voice     Skin: Skin is warm and dry. Psychiatric: She has a normal mood and affect. Her behavior is normal. Thought content normal.   Nursing note and vitals reviewed. ASSESSMENT and PLAN    ICD-10-CM ICD-9-CM    1. Welcome to Medicare preventive visit Z00.00 V70.0 DEXA BONE DENSITY STUDY AXIAL   2.  Essential hypertension with goal blood pressure less than 140/90 I10 401.9 CBC WITH AUTOMATED DIFF      METABOLIC PANEL, COMPREHENSIVE      LIPID PANEL      TSH AND FREE T4   3. Hyperlipidemia, unspecified hyperlipidemia type E78.5 272.4 CBC WITH AUTOMATED DIFF      METABOLIC PANEL, COMPREHENSIVE      LIPID PANEL      TSH AND FREE T4   4. Tremor of left hand R25.1 781.0 CBC WITH AUTOMATED DIFF      METABOLIC PANEL, COMPREHENSIVE      TSH AND FREE T4      VITAMIN B12      REFERRAL TO NEUROLOGY   5. Heel pain, chronic, left M79.672 729.5 REFERRAL TO PODIATRY    G89.29 338.29    6. Gait disturbance R26.9 781.2 CBC WITH AUTOMATED DIFF      METABOLIC PANEL, COMPREHENSIVE      TSH AND FREE T4      VITAMIN B12      REFERRAL TO NEUROLOGY   7. ACP (advance care planning) Z71.89 V65.49 ADVANCE CARE PLANNING FIRST 30 MINS   8. Screening for colon cancer Z12.11 V76.51 OCCULT BLOOD, IMMUNOASSAY (FIT)   9. Disorder of bone and cartilage M89.9 733.90 DEXA BONE DENSITY STUDY AXIAL    M94.9     10. Forgetfulness R68.89 780.99 REFERRAL TO NEUROLOGY   11. Need for hepatitis C screening test Z11.59 V73.89 HEPATITIS C AB       Checking labs today but will defer neurologic workup to Neuro. Referral placed today for new tremor, forgetfulness and change in gait. Continue current medication regimen. F/U in 3 months for HTN and HLD. Reviewed medication and side effects. Patient agrees with the plan and verbalizes understanding. Follow-up Disposition:  Return in about 3 months (around 7/20/2018) for HTN, HLD. Freddy Villa PA-C  4/20/2018              This is a \"Welcome to [employer]"  Initial Preventive Physical Examination (IPPE) providing Personalized Prevention Plan Services (Performed in the first 12 months of enrollment)    I have reviewed the patient's medical history in detail and updated the computerized patient record.      History     Past Medical History:   Diagnosis Date    Hypertension 1990      Past Surgical History:   Procedure Laterality Date    EXTRACTION ERUPTED TOOTH/EXR  HX  SECTION       Current Outpatient Prescriptions   Medication Sig Dispense Refill    rOPINIRole (REQUIP) 0.25 mg tablet Take 1 Tab by mouth three (3) times daily. 90 Tab 3    atorvastatin (LIPITOR) 20 mg tablet Take 1 Tab by mouth daily. 30 Tab 5    valsartan-hydroCHLOROthiazide (DIOVAN HCT) 160-25 mg per tablet Take 1 Tab by mouth daily. Indications: hypertension 90 Tab 1    amLODIPine (NORVASC) 10 mg tablet TAKE 1 TABLET BY MOUTH EVERY DAY 90 Tab 1     No Known Allergies  Family History   Problem Relation Age of Onset    Hypertension Mother     Hypertension Father     Hypertension Brother     Hypertension Brother     Kidney Disease Brother      Social History   Substance Use Topics    Smoking status: Never Smoker    Smokeless tobacco: Never Used    Alcohol use No     Diet, Lifestyle: No special diet    Exercise level: moderately active    Depression Risk Screen     PHQ over the last two weeks 2018   Little interest or pleasure in doing things Not at all   Feeling down, depressed or hopeless Not at all   Total Score PHQ 2 0     Alcohol Risk Screen   You do not drink alcohol or very rarely. Functional Ability and Level of Safety   Hearing Loss  Hearing is good. Vision Screening  Vision is good. Visual Acuity Screening    Right eye Left eye Both eyes   Without correction:      With correction: 20/20 20/20 20/20         Activities of Daily Living  The home contains: no safety equipment. Patient does total self care    Fall Risk Screen  Fall Risk Assessment, last 12 mths 2018   Able to walk? Yes   Fall in past 12 months?  No     Abuse Screen  Patient is not abused    Visit Vitals    /88    Pulse 74    Temp 98 °F (36.7 °C) (Oral)    Resp 20    Ht 5' 8\" (1.727 m)    Wt 161 lb (73 kg)    SpO2 100%    BMI 24.48 kg/m2     A&Ox3  RRR  CTAB    MMSE: 25/30 (could not count backwards)      Screening EKG   EKG order placed: No    Patient Care Team   Patient Care Team:  Raj Medel MD as PCP - General (Family Practice)     End of Life Planning   Advanced care planning directives were discussed with the patient and /or family/caregiver. Assessment/Plan   Education and counseling provided:  Are appropriate based on today's review and evaluation  End-of-Life planning (with patient's consent)  Pneumococcal Vaccine  Colorectal cancer screening tests  Bone mass measurement (DEXA)    Diagnoses and all orders for this visit:    1. Welcome to Medicare preventive visit  -     Dexa Bone Density Study Axial ([id number]); Future    2. Essential hypertension with goal blood pressure less than 140/90  -     CBC WITH AUTOMATED DIFF; Future  -     METABOLIC PANEL, COMPREHENSIVE; Future  -     LIPID PANEL; Future  -     TSH AND FREE T4; Future    3. Hyperlipidemia, unspecified hyperlipidemia type  -     CBC WITH AUTOMATED DIFF; Future  -     METABOLIC PANEL, COMPREHENSIVE; Future  -     LIPID PANEL; Future  -     TSH AND FREE T4; Future    4. Tremor of left hand  -     CBC WITH AUTOMATED DIFF; Future  -     METABOLIC PANEL, COMPREHENSIVE; Future  -     TSH AND FREE T4; Future  -     VITAMIN B12; Future  -     [facility] ref [facility]    5. Heel pain, chronic, left  -     REFERRAL TO PODIATRY    6. Gait disturbance  -     CBC WITH AUTOMATED DIFF; Future  -     METABOLIC PANEL, COMPREHENSIVE; Future  -     TSH AND FREE T4; Future  -     VITAMIN B12; Future  -     [facility] ref [facility]    7. ACP (advance care planning)  -     ADVANCE CARE PLANNING FIRST 30 MINS    8. Screening for colon cancer  -     OCCULT BLOOD, IMMUNOASSAY (FIT); Future    9. Disorder of bone and cartilage  -     Dexa Bone Density Study Axial ([id number]); Future    10. Forgetfulness  -     [facility] ref [facility]    11. Need for hepatitis C screening test  -     HEPATITIS C AB;  Future      Health Maintenance Due   Topic Date Due    Hepatitis C Screening  1950    DTaP/Tdap/Td series (1 - Tdap) 06/06/1973    ZOSTER VACCINE AGE 60>  04/06/2012    GLAUCOMA SCREENING Q2Y  06/06/2017    Bone Densitometry (Dexa) Screening  06/06/2017    FOBT Q 1 YEAR AGE 50-75  01/25/2018

## 2018-04-20 NOTE — PATIENT INSTRUCTIONS
Medicare Wellness Visit, Female    The best way to live healthy is to have a healthy lifestyle by eating a well-balanced diet, exercising regularly, limiting alcohol and stopping smoking. Regular physical exams and screening tests are another way to keep healthy. Preventive exams provided by your health care provider can find health problems before they become diseases or illnesses. Preventive services including immunizations, screening tests, monitoring and exams can help you take care of your own health. All people over age 72 should have a pneumovax  and and a prevnar shot to prevent pneumonia. These are once in a lifetime unless you and your provider decide differently. All people over 65 should have a yearly flu shot and a tetanus vaccine every 10 years. A bone mass density to screen for osteoporosis or thinning of the bones should be done every 2 years after 65. Screening for diabetes mellitus with a blood sugar test should be done every year. Glaucoma is a disease of the eye due to increased ocular pressure that can lead to blindness and it should be done every year by an eye professional.    Cardiovascular screening tests that check for elevated lipids (fatty part of blood) which can lead to heart disease and strokes should be done every 5 years. Colorectal screening that evaluates for blood or polyps in your colon should be done yearly as a stool test or every five years as a flexible sigmoidoscope or every 10 years as a colonoscopy up to age 76. Breast cancer screening with a mammogram is recommended biennially  for women age 54-69. Screening for cervical cancer with a pap smear and pelvic exam is recommended for women after age 72 years every 2 years up to age 79 or when the provider and patient decide to stop. If there is a history of cervical abnormalities or other increased risk for cancer then the test is recommended yearly.     Hepatitis C screening is also recommended for anyone born between [birth year] through [facility]. A shingles vaccine is also recommended once in a lifetime after age 61. Your Medicare Wellness Exam is recommended annually. Here is a list of your current Health Maintenance items with a due date:  Health Maintenance Due   Topic Date Due    Hepatitis C Test  1950    DTaP/Tdap/Td  (1 - Tdap) 06/06/1973    Shingles Vaccine  04/06/2012    Glaucoma Screening   06/06/2017    Bone Mineral Density   06/06/2017    Pneumococcal Vaccine (1 of 2 - PCV13) 06/06/2017    Stool testing for trace blood  01/25/2018    Annual Well Visit  03/20/2018         Medicare Wellness Visit, Female    The best way to live healthy is to have a healthy lifestyle by eating a well-balanced diet, exercising regularly, limiting alcohol and stopping smoking. Regular physical exams and screening tests are another way to keep healthy. Preventive exams provided by your health care provider can find health problems before they become diseases or illnesses. Preventive services including immunizations, screening tests, monitoring and exams can help you take care of your own health. All people over age 72 should have a pneumovax  and and a prevnar shot to prevent pneumonia. These are once in a lifetime unless you and your provider decide differently. All people over 65 should have a yearly flu shot and a tetanus vaccine every 10 years. A bone mass density to screen for osteoporosis or thinning of the bones should be done every 2 years after 65. Screening for diabetes mellitus with a blood sugar test should be done every year. Glaucoma is a disease of the eye due to increased ocular pressure that can lead to blindness and it should be done every year by an eye professional.    Cardiovascular screening tests that check for elevated lipids (fatty part of blood) which can lead to heart disease and strokes should be done every 5 years.     Colorectal screening that evaluates for blood or polyps in your colon should be done yearly as a stool test or every five years as a flexible sigmoidoscope or every 10 years as a colonoscopy up to age 76. Breast cancer screening with a mammogram is recommended biennially  for women age 54-69. Screening for cervical cancer with a pap smear and pelvic exam is recommended for women after age 72 years every 2 years up to age 79 or when the provider and patient decide to stop. If there is a history of cervical abnormalities or other increased risk for cancer then the test is recommended yearly. Hepatitis C screening is also recommended for anyone born between [birth year] through [address]. A shingles vaccine is also recommended once in a lifetime after age 61. Your Medicare Wellness Exam is recommended annually. Here is a list of your current Health Maintenance items with a due date:  Health Maintenance Due   Topic Date Due    Hepatitis C Test  1950    DTaP/Tdap/Td  (1 - Tdap) 06/06/1973    Shingles Vaccine  04/06/2012    Glaucoma Screening   06/06/2017    Bone Mineral Density   06/06/2017    Pneumococcal Vaccine (1 of 2 - PCV13) 06/06/2017    Stool testing for trace blood  01/25/2018          Advance Directives: Care Instructions  Your Care Instructions  An advance directive is a legal way to state your wishes at the end of your life. It tells your family and your doctor what to do if you can no longer say what you want. There are two main types of advance directives. You can change them any time that your wishes change. · A living will tells your family and your doctor your wishes about life support and other treatment. · A durable power of  for health care lets you name a person to make treatment decisions for you when you can't speak for yourself. This person is called a health care agent. If you do not have an advance directive, decisions about your medical care may be made by a doctor or a  who doesn't know you.   It may help to think of an advance directive as a gift to the people who care for you. If you have one, they won't have to make tough decisions by themselves. Follow-up care is a key part of your treatment and safety. Be sure to make and go to all appointments, and call your doctor if you are having problems. It's also a good idea to know your test results and keep a list of the medicines you take. How can you care for yourself at home? · Discuss your wishes with your loved ones and your doctor. This way, there are no surprises. · Many states have a unique form. Or you might use a universal form that has been approved by many states. This kind of form can sometimes be completed and stored online. Your electronic copy will then be available wherever you have a connection to the Internet. In most cases, doctors will respect your wishes even if you have a form from a different state. · You don't need a  to do an advance directive. But you may want to get legal advice. · Think about these questions when you prepare an advance directive:  ¨ Who do you want to make decisions about your medical care if you are not able to? Many people choose a family member or close friend. ¨ Do you know enough about life support methods that might be used? If not, talk to your doctor so you understand. ¨ What are you most afraid of that might happen? You might be afraid of having pain, losing your independence, or being kept alive by machines. ¨ Where would you prefer to die? Choices include your home, a hospital, or a nursing home. ¨ Would you like to have information about hospice care to support you and your family? ¨ Do you want to donate organs when you die? ¨ Do you want certain Yazdanism practices performed before you die? If so, put your wishes in the advance directive. · Read your advance directive every year, and make changes as needed. When should you call for help?   Be sure to contact your doctor if you have any questions. Where can you learn more? Go to http://hugo-pee.info/. Enter R264 in the search box to learn more about \"Advance Directives: Care Instructions. \"  Current as of: September 24, 2016  Content Version: 11.4  © 4826-8455 [employer]. Care instructions adapted under license by [employer] (which disclaims liability or warranty for this information). If you have questions about a medical condition or this instruction, always ask your healthcare professional. [address] any warranty or liability for your use of this information. Well Visit, Over 72: Care Instructions  Your Care Instructions    Physical exams can help you stay healthy. Your doctor has checked your overall health and may have suggested ways to take good care of yourself. He or she also may have recommended tests. At home, you can help prevent illness with healthy eating, regular exercise, and other steps. Follow-up care is a key part of your treatment and safety. Be sure to make and go to all appointments, and call your doctor if you are having problems. It's also a good idea to know your test results and keep a list of the medicines you take. How can you care for yourself at home? · Reach and stay at a healthy weight. This will lower your risk for many problems, such as obesity, diabetes, heart disease, and high blood pressure. · Get at least 30 minutes of exercise on most days of the week. Walking is a good choice. You also may want to do other activities, such as running, swimming, cycling, or playing tennis or team sports. · Do not smoke. Smoking can make health problems worse. If you need help quitting, talk to your doctor about stop-smoking programs and medicines. These can increase your chances of quitting for good. · Protect your skin from too much sun.  When you're outdoors from 10 a.m. to 4 p.m., stay in the shade or cover up with clothing and a hat with a wide brim. Wear sunglasses that block UV rays. Even when it's cloudy, put broad-spectrum sunscreen (SPF 30 or higher) on any exposed skin. · See a dentist one or two times a year for checkups and to have your teeth cleaned. · Wear a seat belt in the car. · Limit alcohol to 2 drinks a day for men and 1 drink a day for women. Too much alcohol can cause health problems. Follow your doctor's advice about when to have certain tests. These tests can spot problems early. For men and women  · Cholesterol. Your doctor will tell you how often to have this done based on your overall health and other things that can increase your risk for heart attack and stroke. · Blood pressure. Have your blood pressure checked during a routine doctor visit. Your doctor will tell you how often to check your blood pressure based on your age, your blood pressure results, and other factors. · Diabetes. Ask your doctor whether you should have tests for diabetes. · Vision. Experts recommend that you have yearly exams for glaucoma and other age-related eye problems. · Hearing. Tell your doctor if you notice any change in your hearing. You can have tests to find out how well you hear. · Colon cancer tests. Keep having colon cancer tests as your doctor recommends. You can have one of several types of tests. · Heart attack and stroke risk. At least every 4 to 6 years, you should have your risk for heart attack and stroke assessed. Your doctor uses factors such as your age, blood pressure, cholesterol, and whether you smoke or have diabetes to show what your risk for a heart attack or stroke is over the next 10 years. · Osteoporosis. Talk to your doctor about whether you should have a bone density test to find out whether you have thinning bones. Also ask your doctor about whether you should take calcium and vitamin D supplements.   For women  · Pap test and pelvic exam. You may no longer need a Pap test. Talk with your doctor about whether to stop or continue to have Pap tests. · Breast exam and mammogram. Ask how often you should have a mammogram, which is an X-ray of your breasts. A mammogram can spot breast cancer before it can be felt and when it is easiest to treat. · Thyroid disease. Talk to your doctor about whether to have your thyroid checked as part of a regular physical exam. Women have an increased chance of a thyroid problem. For men  · Prostate exam. Talk to your doctor about whether you should have a blood test (called a PSA test) for prostate cancer. Experts disagree on whether men should have this test. Some experts recommend that you discuss the benefits and risks of the test with your doctor. · Abdominal aortic aneurysm. Ask your doctor whether you should have a test to check for an aneurysm. You may need a test if you ever smoked or if your parent, brother, sister, or child has had an aneurysm. When should you call for help? Watch closely for changes in your health, and be sure to contact your doctor if you have any problems or symptoms that concern you. Where can you learn more? Go to http://hugo-pee.info/. Enter I347 in the search box to learn more about \"Well Visit, Over 65: Care Instructions. \"  Current as of: May 12, 2017  Content Version: 11.4  © 8164-2894 [employer]. Care instructions adapted under license by [employer] (which disclaims liability or warranty for this information). If you have questions about a medical condition or this instruction, always ask your healthcare professional. [place] [address] any warranty or liability for your use of this information.

## 2018-04-30 DIAGNOSIS — I10 ESSENTIAL HYPERTENSION WITH GOAL BLOOD PRESSURE LESS THAN 140/90: Chronic | ICD-10-CM

## 2018-05-01 ENCOUNTER — HOSPITAL ENCOUNTER (OUTPATIENT)
Dept: BONE DENSITY | Age: 68
Discharge: HOME | End: 2018-05-01
Attending: PHYSICIAN ASSISTANT
Payer: MEDICARE

## 2018-05-01 DIAGNOSIS — M89.9 DISORDER OF BONE AND CARTILAGE: ICD-10-CM

## 2018-05-01 DIAGNOSIS — M94.9 DISORDER OF BONE AND CARTILAGE: ICD-10-CM

## 2018-05-01 DIAGNOSIS — Z00.00 WELCOME TO MEDICARE PREVENTIVE VISIT: ICD-10-CM

## 2018-05-01 PROCEDURE — 77080 DXA BONE DENSITY AXIAL: CPT

## 2018-05-03 NOTE — PROGRESS NOTES
Bone density scan shows Osteopenia, or softening of the bones. I suggest she start taking daily Calcium plus Vitamin D supplement.

## 2018-05-04 RX ORDER — VALSARTAN AND HYDROCHLOROTHIAZIDE 160; 25 MG/1; MG/1
1 TABLET ORAL DAILY
Qty: 90 TAB | Refills: 0 | Status: SHIPPED | OUTPATIENT
Start: 2018-05-04 | End: 2018-08-20 | Stop reason: SDUPTHER

## 2018-05-15 ENCOUNTER — TELEPHONE (OUTPATIENT)
Dept: FAMILY MEDICINE CLINIC | Age: 68
End: 2018-05-15

## 2018-05-15 NOTE — TELEPHONE ENCOUNTER
I called [facility] to see if they had any results on pt FIT Test Kit I spoke with Brooklyn Sanchez and she check and found no results she also checked their problem bucket and found nothing she states \" sometimes the post office will hold them but not sure why they have not received anything since it has been 3 weeks according to pt when she mailed the kit out.

## 2018-05-15 NOTE — TELEPHONE ENCOUNTER
Pt called requesting results for FIT kit. She said he been three weeks. She is requesting a callback.

## 2018-05-15 NOTE — TELEPHONE ENCOUNTER
I called and spoke with pt about pt FIT Test Kit pt states \" that pt mailed her sample about 3 weeks ago in the self address envelope that pt was given with the kit and it was to go to [address] [place] [address]  . I told pt that I would call [facility] to see if they have received it. Pt verbalized understanding.

## 2018-05-17 ENCOUNTER — TELEPHONE (OUTPATIENT)
Dept: FAMILY MEDICINE CLINIC | Age: 68
End: 2018-05-17

## 2018-05-17 NOTE — TELEPHONE ENCOUNTER
I received a call and spoke with pt today about pt FIT test I told the pt that I spoke with [facility] on yesterday and they have not received pt test kit. Per JEANNE Ceballos pt should re do FIT test kit pt verbalized understanding and Pt was advised to stop pass our office to received another FIT Test kit and pt can take sample to lab when done. Pt verbalized understanding.

## 2018-05-18 ENCOUNTER — HOSPITAL ENCOUNTER (OUTPATIENT)
Dept: LAB | Age: 68
Discharge: HOME | End: 2018-05-18
Payer: MEDICARE

## 2018-05-18 DIAGNOSIS — Z12.11 SCREENING FOR COLON CANCER: ICD-10-CM

## 2018-05-18 DIAGNOSIS — E11.42 DIABETIC POLYNEUROPATHY (HCC): ICD-10-CM

## 2018-05-18 LAB — HBA1C MFR BLD: 6 % (ref 4.2–5.6)

## 2018-05-18 PROCEDURE — 83036 HEMOGLOBIN GLYCOSYLATED A1C: CPT | Performed by: PODIATRIST

## 2018-05-18 PROCEDURE — 36415 COLL VENOUS BLD VENIPUNCTURE: CPT | Performed by: PHYSICIAN ASSISTANT

## 2018-05-18 PROCEDURE — 82274 ASSAY TEST FOR BLOOD FECAL: CPT | Performed by: PHYSICIAN ASSISTANT

## 2018-05-19 LAB — HEMOCCULT STL QL IA: NEGATIVE

## 2018-05-21 ENCOUNTER — TELEPHONE (OUTPATIENT)
Dept: FAMILY MEDICINE CLINIC | Age: 68
End: 2018-05-21

## 2018-05-22 ENCOUNTER — TELEPHONE (OUTPATIENT)
Dept: FAMILY MEDICINE CLINIC | Age: 68
End: 2018-05-22

## 2018-05-22 NOTE — TELEPHONE ENCOUNTER
I called and spoke with pt and discuss fit test results which was negative and pt verbalized understanding.

## 2018-05-29 ENCOUNTER — HOSPITAL ENCOUNTER (EMERGENCY)
Age: 68
Discharge: HOME | End: 2018-05-29
Attending: EMERGENCY MEDICINE
Payer: MEDICARE

## 2018-05-29 ENCOUNTER — APPOINTMENT (OUTPATIENT)
Dept: CT IMAGING | Age: 68
End: 2018-05-29
Attending: EMERGENCY MEDICINE
Payer: MEDICARE

## 2018-05-29 VITALS
SYSTOLIC BLOOD PRESSURE: 123 MMHG | OXYGEN SATURATION: 97 % | RESPIRATION RATE: 17 BRPM | HEART RATE: 67 BPM | DIASTOLIC BLOOD PRESSURE: 62 MMHG | TEMPERATURE: 98.2 F

## 2018-05-29 DIAGNOSIS — I10 ACCELERATED HYPERTENSION: ICD-10-CM

## 2018-05-29 DIAGNOSIS — R25.1 TREMOR: ICD-10-CM

## 2018-05-29 DIAGNOSIS — R29.898 LEG HEAVINESS: Primary | ICD-10-CM

## 2018-05-29 LAB
ANION GAP SERPL CALC-SCNC: 4 MMOL/L (ref 3–18)
BASOPHILS # BLD: 0 K/UL (ref 0–0.06)
BASOPHILS NFR BLD: 0 % (ref 0–2)
BUN SERPL-MCNC: 12 MG/DL (ref 7–18)
BUN/CREAT SERPL: 16 (ref 12–20)
CALCIUM SERPL-MCNC: 9.6 MG/DL (ref 8.5–10.1)
CHLORIDE SERPL-SCNC: 106 MMOL/L (ref 100–108)
CO2 SERPL-SCNC: 29 MMOL/L (ref 21–32)
CREAT SERPL-MCNC: 0.77 MG/DL (ref 0.6–1.3)
DIFFERENTIAL METHOD BLD: ABNORMAL
EOSINOPHIL # BLD: 0 K/UL (ref 0–0.4)
EOSINOPHIL NFR BLD: 1 % (ref 0–5)
ERYTHROCYTE [DISTWIDTH] IN BLOOD BY AUTOMATED COUNT: 12.8 % (ref 11.6–14.5)
GLUCOSE SERPL-MCNC: 102 MG/DL (ref 74–99)
HCT VFR BLD AUTO: 39 % (ref 35–45)
HGB BLD-MCNC: 13.4 G/DL (ref 12–16)
LYMPHOCYTES # BLD: 1.3 K/UL (ref 0.9–3.6)
LYMPHOCYTES NFR BLD: 19 % (ref 21–52)
MAGNESIUM SERPL-MCNC: 2.2 MG/DL (ref 1.6–2.6)
MCH RBC QN AUTO: 30.4 PG (ref 24–34)
MCHC RBC AUTO-ENTMCNC: 34.4 G/DL (ref 31–37)
MCV RBC AUTO: 88.4 FL (ref 74–97)
MONOCYTES # BLD: 0.3 K/UL (ref 0.05–1.2)
MONOCYTES NFR BLD: 5 % (ref 3–10)
NEUTS SEG # BLD: 5.1 K/UL (ref 1.8–8)
NEUTS SEG NFR BLD: 75 % (ref 40–73)
PLATELET # BLD AUTO: 187 K/UL (ref 135–420)
PMV BLD AUTO: 11.8 FL (ref 9.2–11.8)
POTASSIUM SERPL-SCNC: 3.6 MMOL/L (ref 3.5–5.5)
RBC # BLD AUTO: 4.41 M/UL (ref 4.2–5.3)
SODIUM SERPL-SCNC: 139 MMOL/L (ref 136–145)
TROPONIN I SERPL-MCNC: <0.02 NG/ML (ref 0–0.04)
WBC # BLD AUTO: 6.9 K/UL (ref 4.6–13.2)

## 2018-05-29 PROCEDURE — 80048 BASIC METABOLIC PNL TOTAL CA: CPT | Performed by: EMERGENCY MEDICINE

## 2018-05-29 PROCEDURE — 99283 EMERGENCY DEPT VISIT LOW MDM: CPT

## 2018-05-29 PROCEDURE — 70450 CT HEAD/BRAIN W/O DYE: CPT

## 2018-05-29 PROCEDURE — 83735 ASSAY OF MAGNESIUM: CPT | Performed by: EMERGENCY MEDICINE

## 2018-05-29 PROCEDURE — 85025 COMPLETE CBC W/AUTO DIFF WBC: CPT | Performed by: EMERGENCY MEDICINE

## 2018-05-29 PROCEDURE — 84484 ASSAY OF TROPONIN QUANT: CPT | Performed by: EMERGENCY MEDICINE

## 2018-05-29 PROCEDURE — 93005 ELECTROCARDIOGRAM TRACING: CPT

## 2018-05-29 NOTE — ED PROVIDER NOTES
EMERGENCY DEPARTMENT HISTORY AND PHYSICAL EXAM    11:47 AM      Date: 2018  Patient Name: Tabatha Talavera    History of Presenting Illness           History Provided By: Patient    Chief Complaint: bilateral LE weakness  Duration:  Months  Timing:  Constant  Location: LE  Quality: n.a  Severity: Moderate  Modifying Factors: improved when resting, worsened when walking. Associated Symptoms: fatigue, tremors, HTN, aching LE pain      Additional History (Context): Tabatha Talavera is a 74 y.o. female with a hx of HTN who presents to the ED with constant bilateral LE weakness. Associated Sx include tremors, fatigue, bilateral LE aching pain, and HTN. Reports that the pain is improved when resting, and worsened when walking. The pt reports that her blood pressure was elevated when she took it earlier today 187/117, she states that she takes it everyday and it normally is 137/84. The pt has been on her current BP medications for over a year now and reports taking as Rx. Denies any further complaints at this time. PCP: Nadine Simpson PA-C    Current Outpatient Prescriptions   Medication Sig Dispense Refill    valsartan-hydroCHLOROthiazide (DIOVAN HCT) 160-25 mg per tablet Take 1 Tab by mouth daily. Indications: hypertension 90 Tab 0    rOPINIRole (REQUIP) 0.25 mg tablet Take 1 Tab by mouth three (3) times daily. 90 Tab 3    atorvastatin (LIPITOR) 20 mg tablet Take 1 Tab by mouth daily.  30 Tab 5    amLODIPine (NORVASC) 10 mg tablet TAKE 1 TABLET BY MOUTH EVERY DAY 90 Tab 1       Past History     Past Medical History:  Past Medical History:   Diagnosis Date    Hypertension        Past Surgical History:  Past Surgical History:   Procedure Laterality Date    EXTRACTION ERUPTED TOOTH/EXR      HX  SECTION         Family History:  Family History   Problem Relation Age of Onset    Hypertension Mother     Hypertension Father     Hypertension Brother     Hypertension Brother     Kidney Disease Brother Social History:  Social History   Substance Use Topics    Smoking status: Never Smoker    Smokeless tobacco: Never Used    Alcohol use No       Allergies:  No Known Allergies      Review of Systems       Review of Systems   Constitutional: Positive for fatigue. Negative for activity change, appetite change, chills, diaphoresis, fever and unexpected weight change. HENT: Negative for congestion, dental problem, drooling, ear discharge, ear pain, facial swelling, hearing loss, mouth sores, nosebleeds, postnasal drip, rhinorrhea, sinus pressure, sneezing, sore throat, tinnitus and trouble swallowing. Eyes: Negative for photophobia, pain, discharge, redness, itching and visual disturbance. Respiratory: Negative for apnea, cough, choking, chest tightness, shortness of breath, wheezing and stridor. Cardiovascular: Negative for chest pain, palpitations and leg swelling. Gastrointestinal: Negative for abdominal distention, abdominal pain, anal bleeding, blood in stool, constipation, diarrhea, nausea, rectal pain and vomiting. Endocrine: Negative for cold intolerance, heat intolerance, polydipsia, polyphagia and polyuria. Genitourinary: Negative for decreased urine volume, difficulty urinating, dysuria, enuresis, flank pain, frequency, genital sores, hematuria and urgency. Musculoskeletal: Negative for arthralgias, back pain, gait problem, joint swelling, myalgias, neck pain and neck stiffness. Bilateral LE aching pain   Skin: Negative for color change, pallor, rash and wound. Allergic/Immunologic: Negative for environmental allergies, food allergies and immunocompromised state. Neurological: Positive for tremors, facial asymmetry and weakness. Negative for dizziness, seizures, syncope, speech difficulty, light-headedness, numbness and headaches. Hematological: Negative for adenopathy. Does not bruise/bleed easily.    Psychiatric/Behavioral: Negative for agitation, behavioral problems, confusion, decreased concentration, dysphoric mood, hallucinations, self-injury, sleep disturbance and suicidal ideas. The patient is not nervous/anxious and is not hyperactive. Physical Exam   There were no vitals taken for this visit. Physical Exam   Constitutional: She is oriented to person, place, and time. She appears well-developed and well-nourished. Alert and appropriate in no apparent marked discomfort or acute respiratory distress   HENT:   Head: Normocephalic and atraumatic. Right Ear: External ear normal.   Left Ear: External ear normal.   Mouth/Throat: Oropharynx is clear and moist. No oropharyngeal exudate. Eyes: Conjunctivae and EOM are normal. Pupils are equal, round, and reactive to light. Right eye exhibits no discharge. Left eye exhibits no discharge. No scleral icterus. Neck: Normal range of motion. Neck supple. No JVD present. No tracheal deviation present. No thyromegaly present. Cardiovascular: Normal rate, regular rhythm, normal heart sounds and intact distal pulses. Exam reveals no gallop and no friction rub. No murmur heard. Pulmonary/Chest: Effort normal and breath sounds normal. No respiratory distress. She has no wheezes. She has no rales. She exhibits no tenderness. Abdominal: Soft. Bowel sounds are normal. She exhibits no distension. There is no tenderness. There is no rebound and no guarding. Musculoskeletal: Normal range of motion. She exhibits no edema or tenderness. Lymphadenopathy:     She has no cervical adenopathy. Neurological: She is alert and oriented to person, place, and time. No cranial nerve deficit. She exhibits normal muscle tone. Coordination normal.   CN II-XII grossly intact, no clonus or pronator drift; normal finger-to-nose; negative Romberg negative, sensation to light touch intact and symmetrical; gait intact; symmetrical mild tremor bilateral upper extremities   Skin: Skin is warm. No erythema.    Psychiatric: She has a normal mood and affect. Her behavior is normal. Judgment and thought content normal.   Nursing note and vitals reviewed. Diagnostic Study Results     Labs -  Recent Results (from the past 12 hour(s))   CBC WITH AUTOMATED DIFF    Collection Time: 05/29/18  1:07 PM   Result Value Ref Range    WBC 6.9 4.6 - 13.2 K/uL    RBC 4.41 4.20 - 5.30 M/uL    HGB 13.4 12.0 - 16.0 g/dL    HCT 39.0 35.0 - 45.0 %    MCV 88.4 74.0 - 97.0 FL    MCH 30.4 24.0 - 34.0 PG    MCHC 34.4 31.0 - 37.0 g/dL    RDW 12.8 11.6 - 14.5 %    PLATELET 832 104 - 060 K/uL    MPV 11.8 9.2 - 11.8 FL    NEUTROPHILS 75 (H) 40 - 73 %    LYMPHOCYTES 19 (L) 21 - 52 %    MONOCYTES 5 3 - 10 %    EOSINOPHILS 1 0 - 5 %    BASOPHILS 0 0 - 2 %    ABS. NEUTROPHILS 5.1 1.8 - 8.0 K/UL    ABS. LYMPHOCYTES 1.3 0.9 - 3.6 K/UL    ABS. MONOCYTES 0.3 0.05 - 1.2 K/UL    ABS. EOSINOPHILS 0.0 0.0 - 0.4 K/UL    ABS. BASOPHILS 0.0 0.0 - 0.06 K/UL    DF AUTOMATED     METABOLIC PANEL, BASIC    Collection Time: 05/29/18  1:07 PM   Result Value Ref Range    Sodium 139 136 - 145 mmol/L    Potassium 3.6 3.5 - 5.5 mmol/L    Chloride 106 100 - 108 mmol/L    CO2 29 21 - 32 mmol/L    Anion gap 4 3.0 - 18 mmol/L    Glucose 102 (H) 74 - 99 mg/dL    BUN 12 7.0 - 18 MG/DL    Creatinine 0.77 0.6 - 1.3 MG/DL    BUN/Creatinine ratio 16 12 - 20      GFR est AA >60 >60 ml/min/1.73m2    GFR est non-AA >60 >60 ml/min/1.73m2    Calcium 9.6 8.5 - 10.1 MG/DL   MAGNESIUM    Collection Time: 05/29/18  1:07 PM   Result Value Ref Range    Magnesium 2.2 1.6 - 2.6 mg/dL   TROPONIN I    Collection Time: 05/29/18  1:07 PM   Result Value Ref Range    Troponin-I, Qt. <0.02 0.0 - 0.045 NG/ML       Radiologic Studies -   CT HEAD WO CONT   Final Result        IMPRESSION:     No CT evidence of acute intracranial abnormality.    -If clinical concern for acute ischemia MRI is more sensitive for further  evaluation.     EKG: NSR, rate of 68, normal axis, normal intervals without acute S-T or T-wave changes or change from prior EKG- read at [address]    I am the first provider for this patient. I reviewed the vital signs, available nursing notes, past medical history, past surgical history, family history and social history. Vital Signs-Reviewed the patient's vital signs. Records Reviewed: nursing notes, previous charts  (Time of Review: 11:47 AM)    ED Course: Progress Notes, Reevaluation, and Consults: Patient remained alert and appropriate. Serial neurovascular examinations were stable and normal. I discussed the clinical findings and concerns with the patient and she agreed with continued symptomatic treatment and close follow-up with Neurology as already scheduled. Precautions were given prior to discharge. Provider Notes (Medical Decision Making): Patient with bilateral leg \"heaviness\" x 2 months. Mainly came into ED today because blood pressure was also elevated without new neurologic symptoms or worsening leg heaviness. Due to questionable recent facial droop will check CT scan for intracranial lesion. Will also check for anemia, ACS, arrhythmia, electrolyte abnormality or renal dysfunction. No signs of vascular emergency or ischemia. Already has neurology follow-up and will continue with that plan if work-up today is reassuring. Diagnosis     Clinical Impression:   1. Leg heaviness    2. Accelerated hypertension    3.  Tremor        Disposition: Discharge    Follow-up Information     Follow up With Details Comments Contact Info    [facility] EMERGENCY DEPT  As needed, If symptoms worsen [address] [facility] [address] [phone]    Jose E Huang PA-C In 3 days As needed if still having difficulty controlling blood pressure or symptoms are getting worse [address] [facility] [address]  [phone]             Patient's Medications   Start Taking    No medications on file   Continue Taking    AMLODIPINE (NORVASC) 10 MG TABLET TAKE 1 TABLET BY MOUTH EVERY DAY    ATORVASTATIN (LIPITOR) 20 MG TABLET    Take 1 Tab by mouth daily. ROPINIROLE (REQUIP) 0.25 MG TABLET    Take 1 Tab by mouth three (3) times daily. VALSARTAN-HYDROCHLOROTHIAZIDE (DIOVAN HCT) 160-25 MG PER TABLET    Take 1 Tab by mouth daily. Indications: hypertension   These Medications have changed    No medications on file   Stop Taking    No medications on file     _______________________________    Attestations:  Brenda acting as a scribe for and in the presence of Margarita Kiesr MD      May 29, 2018 at 11:47 AM       Provider Attestation:      I personally performed the services described in the documentation, reviewed the documentation, as recorded by the scribe in my presence, and it accurately and completely records my words and actions.  May 29, 2018 at 11:47 AM - Margarita Kiser MD    _______________________________

## 2018-05-29 NOTE — DISCHARGE INSTRUCTIONS
Return immediately for increasing pain, fever, weakness, change in vision or speech, or any other concerns  Continue to monitor blood pressure but hold on dose adjustment unless persistently elevated  Follow-up with Neurologist as scheduled         Acute High Blood Pressure: Care Instructions  Your Care Instructions    Acute high blood pressure is very high blood pressure. It's a serious problem. Very high blood pressure can damage your brain, heart, eyes, and kidneys. You may have been given medicines to lower your blood pressure. You may have gotten them as pills or through a needle in one of your veins. This is called an IV. And maybe you were given other medicines too. These can be needed when high blood pressure causes other problems. To keep your blood pressure at a lower level, you may need to make healthy lifestyle changes. And you will probably need to take medicines. Be sure to follow up with your doctor about your blood pressure and what you can do about it. Follow-up care is a key part of your treatment and safety. Be sure to make and go to all appointments, and call your doctor if you are having problems. It's also a good idea to know your test results and keep a list of the medicines you take. How can you care for yourself at home? · See your doctor as often as he or she recommends. This is to make sure your blood pressure is under control. You will probably go at least 2 times a year. But it may be more often at first.  · Take your blood pressure medicine exactly as prescribed. You may take one or more types. They include diuretics, beta-blockers, ACE inhibitors, calcium channel blockers, and angiotensin II receptor blockers. Call your doctor if you think you are having a problem with your medicine. · If you take blood pressure medicine, talk to your doctor before you take decongestants or anti-inflammatory medicine, such as ibuprofen. These can raise blood pressure.   · Learn how to check your blood pressure at home. Check it often. · Ask your doctor if it's okay to drink alcohol. · Talk to your doctor about lifestyle changes that can help blood pressure. These include being active and not smoking. When should you call for help? Call 911 anytime you think you may need emergency care. This may mean having symptoms that suggest that your blood pressure is causing a serious heart or blood vessel problem. Your blood pressure may be over 180/110. ? For example, call 911 if:  ? · You have symptoms of a heart attack. These may include:  ¨ Chest pain or pressure, or a strange feeling in the chest.  ¨ Sweating. ¨ Shortness of breath. ¨ Nausea or vomiting. ¨ Pain, pressure, or a strange feeling in the back, neck, jaw, or upper belly or in one or both shoulders or arms. ¨ Lightheadedness or sudden weakness. ¨ A fast or irregular heartbeat. ? · You have symptoms of a stroke. These may include:  ¨ Sudden numbness, tingling, weakness, or loss of movement in your face, arm, or leg, especially on only one side of your body. ¨ Sudden vision changes. ¨ Sudden trouble speaking. ¨ Sudden confusion or trouble understanding simple statements. ¨ Sudden problems with walking or balance. ¨ A sudden, severe headache that is different from past headaches. ? · You have severe back or belly pain. ?Do not wait until your blood pressure comes down on its own. Get help right away. ?Call your doctor now or seek immediate care if:  ? · Your blood pressure is much higher than normal (such as 180/110 or higher), but you don't have symptoms. ? · You think high blood pressure is causing symptoms, such as:  ¨ Severe headache. ¨ Blurry vision. ? Watch closely for changes in your health, and be sure to contact your doctor if:  ? · Your blood pressure measures 140/90 or higher at least 2 times. That means the top number is 140 or higher or the bottom number is 90 or higher, or both.    ? · You think you may be having side effects from your blood pressure medicine. ? · Your blood pressure is usually normal, but it goes above normal at least 2 times. Where can you learn more? Go to http://hugo-pee.info/. Enter H966 in the search box to learn more about \"Acute High Blood Pressure: Care Instructions. \"  Current as of: September 21, 2016  Content Version: 11.4  © 4960-7836 [employer]. Care instructions adapted under license by [employer] (which disclaims liability or warranty for this information). If you have questions about a medical condition or this instruction, always ask your healthcare professional. [place] [address] any warranty or liability for your use of this information.

## 2018-05-30 ENCOUNTER — PATIENT OUTREACH (OUTPATIENT)
Dept: FAMILY MEDICINE CLINIC | Age: 68
End: 2018-05-30

## 2018-05-30 LAB
ATRIAL RATE: 68 BPM
CALCULATED P AXIS, ECG09: 63 DEGREES
CALCULATED R AXIS, ECG10: 52 DEGREES
CALCULATED T AXIS, ECG11: 50 DEGREES
DIAGNOSIS, 93000: NORMAL
P-R INTERVAL, ECG05: 126 MS
Q-T INTERVAL, ECG07: 398 MS
QRS DURATION, ECG06: 80 MS
QTC CALCULATION (BEZET), ECG08: 423 MS
VENTRICULAR RATE, ECG03: 68 BPM

## 2018-05-30 NOTE — PROGRESS NOTES
Nurse Navigator spoke briefly with patient via telephone call. Patient stated she was in the car. Patient reports that she was referred to Dr. Angelo Marion at [facility] for follow up. Patient states she tried to call but did not get through on the phone number she was calling. Nurse Navigator will follow up with patient at a later time for further discussion. Per [address] [place] [address] ED  Discharge Follow-Up      Date/Time:  5/30/2018 8:40 AM    Patient was admitted to [facility] ED on 5-29-18 and discharged on 5-29-18 for :  - Leg heaviness  - Accelerated hypertension  - Tremor  Per progress note by Tamera Medina MD on 5-29-18. The physician progress note  was available at the time of outreach. Nurse Navigator will continue with outreach efforts.

## 2018-05-31 ENCOUNTER — PATIENT OUTREACH (OUTPATIENT)
Dept: FAMILY MEDICINE CLINIC | Age: 68
End: 2018-05-31

## 2018-05-31 NOTE — PROGRESS NOTES
ED  Discharge Follow-Up      Date/Time:  2018 4:37 PM    Patient was admitted to [facility] on 18 and discharged on 18 for leg heaviness, accelerated hypertension, and tremors The physician diprogress note  was available at the time of outreach. Patient was contacted within 2 business days of discharge. Top Challenges reviewed with the provider   New patient appointment scheduled for 18  Patient s/p ED visit 18  No ACP on file  Neurology appointment scheduled for 18. Method of communication with provider : Chart routing. Inpatient RRAT score: n/a   Was this a readmission? N/a   Patient stated reason for the readmission: n/a     Nurse Navigator (NN) contacted the patient by telephone to perform post hospital discharge assessment. Verified name and  with patient as identifiers. Provided introduction to self, and explanation of the Nurse Navigator role. Patient states that she is \"doing great\". Patient denies headache and blurred vision. Patient states that her BP is fine. Patient reports that her legs still feel achy and heavy. Patient reports that she has a follow up appointment with Dr. Samanta Bettencourt (neurology) on 18. Patient had previously stated that she does not want to follow up at [address] [place] [address]. Patient would like to follow up with a practice that is closer to her home. Patient was assisted with making a follow up appointment with Dr. Angelica Haile @ [facility] for 18 @ 11:30. Patient asked to please arrive by 11:00 to fill out new patient paperwork. Summary of patient's top problems:  1. Patient s/p ED visit 18   2. No ACP on file       Home Health orders at discharge: [address]: n/a   Date of initial visit: [employer] ordered/company: n/a  Durable Medical Equipment received: n/a     Barriers to care? NO barriers noted at this time.     Advance Care Planning:   Does patient have an Advance Directive:  not on file     Medication(s):     New Medications at Discharge: n/a  Changed Medications at Discharge: n/a  Discontinued Medications at Discharge: n/a     Medication reconciliation was performed with patient, who verbalizes understanding of administration of home medications. There were no barriers to obtaining medications identified at this time. Patient reports that she is taking:   Calcium 600 + Vitamin D3  1 tab daily  Glucosamine - Chrondrotin 2 daily. Medications not added to medication list due to nurse navigator unsure of dosages. Will ask patient to please bring in medications with her to PCP appointment. Referral to Pharm D needed: no     Current Outpatient Prescriptions   Medication Sig    valsartan-hydroCHLOROthiazide (DIOVAN HCT) 160-25 mg per tablet Take 1 Tab by mouth daily. Indications: hypertension    rOPINIRole (REQUIP) 0.25 mg tablet Take 1 Tab by mouth three (3) times daily.  atorvastatin (LIPITOR) 20 mg tablet Take 1 Tab by mouth daily.  amLODIPine (NORVASC) 10 mg tablet TAKE 1 TABLET BY MOUTH EVERY DAY     No current facility-administered medications for this visit. There are no discontinued medications. PCP/Specialist follow up:   Future Appointments  Date Time Provider Maritza Elam   6/4/2018 11:30 AM Al Cruz MD [facility]   6/19/2018 10:00 AM Madhavi Galdamez MD [address]          Goals      Establish PCP relationships and regularly scheduled appointments. Target Date: 6-4-18    Patient has PCP new patient appointment scheduled with Dr. Ramy Toussaint for 6-4-18.

## 2018-05-31 NOTE — PROGRESS NOTES
I was listed as Patient's PCP. Patient lives in [place]. Can you please set up with a PCP closer to patient's location. Unless she wants to come out to [facility] to see me.

## 2018-06-01 ENCOUNTER — PATIENT OUTREACH (OUTPATIENT)
Dept: FAMILY MEDICINE CLINIC | Age: 68
End: 2018-06-01

## 2018-06-01 NOTE — PROGRESS NOTES
Incoming telephone call received from patient. Patient reports that her PCP appointment has been changed to 6-12-18 with Ms. Pineda Heart @ [facility]. Patient asked to bring list of her medications and supplements to appointment. Patient referred to PCP re: questions re: supplements. Patient reports that she is taking Calcium 600 + Vitamin D3 (10 mg). This combination was not found in medication  database. Patient reports she is feeling well and BP was not elevated today. Patient states BP was 116/69. Patient asked to follow up with the ED if she has any concerns about her BP or if she experiences dizziness or feeling light headed. Patient voiced understanding of this information.

## 2018-06-12 ENCOUNTER — OFFICE VISIT (OUTPATIENT)
Dept: FAMILY MEDICINE CLINIC | Age: 68
End: 2018-06-12

## 2018-06-12 ENCOUNTER — TELEPHONE (OUTPATIENT)
Dept: FAMILY MEDICINE CLINIC | Age: 68
End: 2018-06-12

## 2018-06-12 ENCOUNTER — PATIENT OUTREACH (OUTPATIENT)
Dept: FAMILY MEDICINE CLINIC | Age: 68
End: 2018-06-12

## 2018-06-12 VITALS
HEART RATE: 65 BPM | WEIGHT: 159.2 LBS | RESPIRATION RATE: 12 BRPM | BODY MASS INDEX: 24.13 KG/M2 | HEIGHT: 68 IN | SYSTOLIC BLOOD PRESSURE: 138 MMHG | DIASTOLIC BLOOD PRESSURE: 82 MMHG | TEMPERATURE: 98.3 F | OXYGEN SATURATION: 98 %

## 2018-06-12 DIAGNOSIS — M25.561 CHRONIC PAIN OF RIGHT KNEE: ICD-10-CM

## 2018-06-12 DIAGNOSIS — R25.1 TREMOR OF LEFT HAND: ICD-10-CM

## 2018-06-12 DIAGNOSIS — G89.29 CHRONIC PAIN OF RIGHT KNEE: ICD-10-CM

## 2018-06-12 DIAGNOSIS — R35.1 FREQUENT URINATION AT NIGHT: ICD-10-CM

## 2018-06-12 DIAGNOSIS — I83.90 VARICOSE VEIN OF LEG: ICD-10-CM

## 2018-06-12 DIAGNOSIS — M85.80 OSTEOPENIA, UNSPECIFIED LOCATION: ICD-10-CM

## 2018-06-12 DIAGNOSIS — E78.5 DYSLIPIDEMIA: ICD-10-CM

## 2018-06-12 DIAGNOSIS — R73.03 PREDIABETES: ICD-10-CM

## 2018-06-12 DIAGNOSIS — R26.89 SHUFFLING GAIT: ICD-10-CM

## 2018-06-12 DIAGNOSIS — I10 ESSENTIAL HYPERTENSION WITH GOAL BLOOD PRESSURE LESS THAN 140/90: Primary | ICD-10-CM

## 2018-06-12 DIAGNOSIS — R29.898 LEG HEAVINESS: ICD-10-CM

## 2018-06-12 RX ORDER — CHOLECALCIFEROL TAB 125 MCG (5000 UNIT) 125 MCG
TAB ORAL DAILY
COMMUNITY

## 2018-06-12 RX ORDER — AMLODIPINE BESYLATE 5 MG/1
5 TABLET ORAL DAILY
Qty: 90 TAB | Refills: 0 | Status: SHIPPED | OUTPATIENT
Start: 2018-06-12 | End: 2019-01-27 | Stop reason: SDUPTHER

## 2018-06-12 NOTE — PROGRESS NOTES
Chief Complaint   Patient presents with    Establish Care    Cholesterol Problem    Memory Loss    Tremors    Hypertension    Arthritis       Visit Vitals    /78 (BP 1 Location: Right arm, BP Patient Position: Sitting)    Pulse 65    Temp 98.3 °F (36.8 °C) (Oral)    Resp 12    Ht 5' 8\" (1.727 m)    Wt 159 lb 3.2 oz (72.2 kg)    SpO2 98%    BMI 24.21 kg/m2     Patient is fasting. Patient in room # 5.     1. Have you been to the ER, urgent care clinic since your last visit? Hospitalized since your last visit? Yes When: 05/29/2018 Where: [facility] ER Reason for visit: leg heaviness, htn and tremor    2. Have you seen or consulted any other health care providers outside of the [address] [place] [address] since your last visit? Include any pap smears or colon screening. No    HM Reviewed. Eye exam is due. Patient to make appointment. Flowsheet, Fall, learning needs and ADL completed.     Upcoming Appt, Neurology,Dr. Rosanna Cespedes, on 06/19/2018

## 2018-06-12 NOTE — TELEPHONE ENCOUNTER
Called home/cell number. Left message on answering machine.  This call was concerning message below per Sylvia ANGEL.      ----- Message from Alia Holt sent at 6/12/2018 12:28 PM EDT -----  Patient can stop her Requip if she feels it's not helping

## 2018-06-12 NOTE — MR AVS SNAPSHOT
1017 [facility] 250 200 [facility] 
[phone] Patient: Kelly Melara MRN: [id number] Visit Information Date & Time Provider Department Dept. Phone Encounter #  
 6/12/2018  9:00 AM Aisha Pandey, [address] [id number] Follow-up Instructions Return in about 3 months (around 9/12/2018) for fasting lipid panel prior. Your Appointments 6/19/2018 10:00 AM  
New Patient with Nyasia Swartz MD  
[facility] at [address] [facility]) Appt Note: Monroe Yoder/Tremor L Hand, Gait Disturbance, Forgetfulness/Ref and notes in cc  
 27 e [facility] B[facility]2 [facility] [address] 200 [facility] Upcoming Health Maintenance Date Due Hepatitis C Screening 1950 DTaP/Tdap/Td series (1 - Tdap) 6/6/1973 ZOSTER VACCINE AGE 60> 4/6/2012 GLAUCOMA SCREENING Q2Y 6/6/2017 Pneumococcal 65+ Low/Medium Risk (1 of 2 - PCV13) 4/20/2019* Influenza Age 5 to Adult 8/1/2018 MEDICARE YEARLY EXAM 4/21/2019 FOBT Q 1 YEAR AGE 50-75 5/18/2019 BREAST CANCER SCRN MAMMOGRAM 4/5/2020 *Topic was postponed. The date shown is not the original due date. Allergies as of 6/12/2018  Review Complete On: 6/12/2018 By: JEANNE Rios No Known Allergies Current Immunizations  Never Reviewed No immunizations on file. Not reviewed this visit You Were Diagnosed With   
  
 Codes Comments Essential hypertension with goal blood pressure less than 140/90    -  Primary ICD-10-CM: I10 
ICD-9-CM: 401.9 Prediabetes     ICD-10-CM: R73.03 
ICD-9-CM: 790.29 Dyslipidemia     ICD-10-CM: E78.5 ICD-9-CM: 272.4 Osteopenia, unspecified location     ICD-10-CM: M85.80 ICD-9-CM: 733.90  Chronic pain of right knee     ICD-10-CM: M25.561, G89.29 
 ICD-9-CM: 719.46, 338.29 Varicose vein of leg     ICD-10-CM: I83.90 ICD-9-CM: 454.9 Leg heaviness     ICD-10-CM: R29.898 ICD-9-CM: 729.89 Vitals BP Pulse Temp Resp Height(growth percentile) Weight(growth percentile) 138/82 65 98.3 °F (36.8 °C) (Oral) 12 5' 8\" (1.727 m) 159 lb 3.2 oz (72.2 kg) SpO2 BMI OB Status Smoking Status 98% 24.21 kg/m2 Postmenopausal Never Smoker Vitals History BMI and BSA Data Body Mass Index Body Surface Area  
 24.21 kg/m 2 1.86 m 2 Preferred Pharmacy Pharmacy Name Phone [address], Juan Ordaz [address] [place] [address] 22 1700 [facility] [phone] Your Updated Medication List  
  
   
This list is accurate as of 6/12/18 10:15 AM.  Always use your most recent med list. amLODIPine 5 mg tablet Commonly known as:  Sundra Summerdale Take 1 Tab by mouth daily. atorvastatin 20 mg tablet Commonly known as:  LIPITOR Take 1 Tab by mouth daily. CALCIUM + D PO Take  by mouth. cholecalciferol (VITAMIN D3) 5,000 unit Tab tablet Commonly known as:  VITAMIN D3 Take  by mouth daily. GLUCOSAMINE HCL PO Take  by mouth. rOPINIRole 0.25 mg tablet Commonly known as:  Chaparro Coyer Take 1 Tab by mouth three (3) times daily. valsartan-hydroCHLOROthiazide 160-25 mg per tablet Commonly known as:  DIOVAN HCT Take 1 Tab by mouth daily. Indications: hypertension VITAMIN B-12 PO Take  by mouth. Prescriptions Sent to Pharmacy Refills  
 amLODIPine (NORVASC) 5 mg tablet 0 Sig: Take 1 Tab by mouth daily. Class: Normal  
 Pharmacy: [address] [place] [address] #: [phone] Route: Oral  
  
We Performed the Following REFERRAL TO PHYSICAL THERAPY [NQV08 Custom] Comments:  
 Please refer for nutrition services only.  Needs some dietary guidance for prediabetes, high cholesterol, and osteopenia Follow-up Instructions Return in about 3 months (around 9/12/2018) for fasting lipid panel prior. To-Do List   
 Around 06/13/2018 Lab:  LIPID PANEL Referral Information Referral ID Referred By Referred To  
  
 [id number] АНДРЕЙ, [address] [place] [address] Derrick Gamboa Phone: [phone] Visits Status Start Date End Date 1 New Request 6/12/18 6/12/19 If your referral has a status of pending review or denied, additional information will be sent to support the outcome of this decision. Patient Instructions A Healthy Lifestyle: Care Instructions Your Care Instructions A healthy lifestyle can help you feel good, stay at a healthy weight, and have plenty of energy for both work and play. A healthy lifestyle is something you can share with your whole family. A healthy lifestyle also can lower your risk for serious health problems, such as high blood pressure, heart disease, and diabetes. You can follow a few steps listed below to improve your health and the health of your family. Follow-up care is a key part of your treatment and safety. Be sure to make and go to all appointments, and call your doctor if you are having problems. It's also a good idea to know your test results and keep a list of the medicines you take. How can you care for yourself at home? · Do not eat too much sugar, fat, or fast foods. You can still have dessert and treats now and then. The goal is moderation. · Start small to improve your eating habits. Pay attention to portion sizes, drink less juice and soda pop, and eat more fruits and vegetables. ¨ Eat a healthy amount of food. A 3-ounce serving of meat, for example, is about the size of a deck of cards. Fill the rest of your plate with vegetables and whole grains. ¨ Limit the amount of soda and sports drinks you have every day. Drink more water when you are thirsty. ¨ Eat at least 5 servings of fruits and vegetables every day. It may seem like a lot, but it is not hard to reach this goal. A serving or helping is 1 piece of fruit, 1 cup of vegetables, or 2 cups of leafy, raw vegetables. Have an apple or some carrot sticks as an afternoon snack instead of a candy bar. Try to have fruits and/or vegetables at every meal. 
· Make exercise part of your daily routine. You may want to start with simple activities, such as walking, bicycling, or slow swimming. Try to be active 30 to 60 minutes every day. You do not need to do all 30 to 60 minutes all at once. For example, you can exercise 3 times a day for 10 or 20 minutes. Moderate exercise is safe for most people, but it is always a good idea to talk to your doctor before starting an exercise program. 
· Keep moving. Kathy Ledger the lawn, work in the garden, or [employer]. Take the stairs instead of the elevator at work. · If you smoke, quit. People who smoke have an increased risk for heart attack, stroke, cancer, and other lung illnesses. Quitting is hard, but there are ways to boost your chance of quitting tobacco for good. ¨ Use nicotine gum, patches, or lozenges. ¨ Ask your doctor about stop-smoking programs and medicines. ¨ Keep trying. In addition to reducing your risk of diseases in the future, you will notice some benefits soon after you stop using tobacco. If you have shortness of breath or asthma symptoms, they will likely get better within a few weeks after you quit. · Limit how much alcohol you drink. Moderate amounts of alcohol (up to 2 drinks a day for men, 1 drink a day for women) are okay. But drinking too much can lead to liver problems, high blood pressure, and other health problems. Family health If you have a family, there are many things you can do together to improve your health. · Eat meals together as a family as often as possible. · Eat healthy foods. This includes fruits, vegetables, lean meats and dairy, and whole grains. · Include your family in your fitness plan. Most people think of activities such as jogging or tennis as the way to fitness, but there are many ways you and your family can be more active. Anything that makes you breathe hard and gets your heart pumping is exercise. Here are some tips: 
¨ Walk to do errands or to take your child to school or the bus. ¨ Go for a family bike ride after dinner instead of watching TV. Where can you learn more? Go to http://hugoKleekpee.info/. Enter R419 in the search box to learn more about \"A Healthy Lifestyle: Care Instructions. \" Current as of: May 12, 2017 Content Version: 11.4 © 8565-0395 [employer]. Care instructions adapted under license by [employer] (which disclaims liability or warranty for this information). If you have questions about a medical condition or this instruction, always ask your healthcare professional. [address] any warranty or liability for your use of this information. Please provide this summary of care documentation to your next provider. Your primary care clinician is listed as Yi Pleitez. If you have any questions after today's visit, please call [phone].

## 2018-06-12 NOTE — PATIENT INSTRUCTIONS

## 2018-06-13 NOTE — PROGRESS NOTES
Patient attended new patient appointment with Mrs. Lane Hernandez, [address] @ [employer]. Notification sent to [facility], RN Nurse Navigator.

## 2018-06-19 ENCOUNTER — OFFICE VISIT (OUTPATIENT)
Dept: NEUROLOGY | Age: 68
End: 2018-06-19

## 2018-06-19 VITALS — WEIGHT: 160 LBS | BODY MASS INDEX: 24.25 KG/M2 | HEIGHT: 68 IN

## 2018-06-19 DIAGNOSIS — G20 PARKINSON DISEASE (HCC): Primary | ICD-10-CM

## 2018-06-19 RX ORDER — ROPINIROLE 0.25 MG/1
TABLET, FILM COATED ORAL 3 TIMES DAILY
COMMUNITY
End: 2018-09-07 | Stop reason: SDUPTHER

## 2018-06-19 RX ORDER — CARBIDOPA AND LEVODOPA 25; 100 MG/1; MG/1
1 TABLET ORAL 3 TIMES DAILY
Qty: 90 TAB | Refills: 5 | Status: SHIPPED | OUTPATIENT
Start: 2018-06-19 | End: 2018-09-07 | Stop reason: SDUPTHER

## 2018-06-19 NOTE — MR AVS SNAPSHOT
[address] [facility] 
 
 
 [address] 706 [facility] 
[phone] Patient: Iraida Roberson MRN: [id number] Visit Information Date & Time Provider Department Dept. Phone Encounter #  
 6/19/2018 10:00 AM Miguel Sharpe MD [address] at 777 [facility] [id number] Follow-up Instructions Return in about 4 weeks (around 7/17/2018). Your Appointments 8/21/2018  2:40 PM  
Follow Up with Miguel Sharpe MD  
[address] at [address] [facility] 3651 [facility]) Appt Note: 4 week fu  
 [address] [facility] 129 [address] [facility] 630 [facility] [address] 706 [facility] 9/12/2018  8:30 AM  
FOLLOW UP EXAM with JEANNE Hein 2056 [facility] (3651 [facility]) Appt Note: 3 mth f/u  
 [address] 706 [facility]  
[facility] [place] [address] [facility] [address] [place] [address] Upcoming Health Maintenance Date Due Hepatitis C Screening 1950 DTaP/Tdap/Td series (1 - Tdap) 6/6/1973 ZOSTER VACCINE AGE 60> 4/6/2012 GLAUCOMA SCREENING Q2Y 6/6/2017 Pneumococcal 65+ Low/Medium Risk (1 of 2 - PCV13) 4/20/2019* Influenza Age 5 to Adult 8/1/2018 MEDICARE YEARLY EXAM 4/21/2019 FOBT Q 1 YEAR AGE 50-75 5/18/2019 BREAST CANCER SCRN MAMMOGRAM 4/5/2020 *Topic was postponed. The date shown is not the original due date. Allergies as of 6/19/2018  Review Complete On: 6/19/2018 By: Ana Love LPN No Known Allergies Current Immunizations  Never Reviewed No immunizations on file. Not reviewed this visit You Were Diagnosed With   
  
 Codes Comments Parkinson disease ([address]    -  Primary ICD-10-CM: G20 
ICD-9-CM: 332.0 Vitals Height(growth percentile) Weight(growth percentile) BMI OB Status Smoking Status 5' 8\" (1.727 m) 160 lb (72.6 kg) 24.33 kg/m2 Postmenopausal Never Smoker BMI and BSA Data Body Mass Index Body Surface Area  
 24.33 kg/m 2 1.87 m 2 Preferred Pharmacy Pharmacy Name Phone [address], [address] 17 [facility] 22 [phone]  [facility] [phone] Your Updated Medication List  
  
   
This list is accurate as of 6/19/18 10:28 AM.  Always use your most recent med list. amLODIPine 5 mg tablet Commonly known as:  Haresh Emerald Take 1 Tab by mouth daily. atorvastatin 20 mg tablet Commonly known as:  LIPITOR Take 1 Tab by mouth daily. CALCIUM + D PO Take  by mouth.  
  
 carbidopa-levodopa  mg per tablet Commonly known as:  SINEMET Take 1 Tab by mouth three (3) times daily. cholecalciferol (VITAMIN D3) 5,000 unit Tab tablet Commonly known as:  VITAMIN D3 Take  by mouth daily. GLUCOSAMINE HCL PO Take  by mouth. rOPINIRole 0.25 mg tablet Commonly known as:  Misty Alpha Take  by mouth three (3) times daily. valsartan-hydroCHLOROthiazide 160-25 mg per tablet Commonly known as:  DIOVAN HCT Take 1 Tab by mouth daily. Indications: hypertension VITAMIN B-12 PO Take  by mouth. Prescriptions Sent to Pharmacy Refills  
 carbidopa-levodopa (SINEMET)  mg per tablet 5 Sig: Take 1 Tab by mouth three (3) times daily. Class: Normal  
 Pharmacy: [address] [place] [address] #: [phone] Route: Oral  
  
Follow-up Instructions Return in about 4 weeks (around 7/17/2018). To-Do List   
 06/19/2018 Imaging:  MRI BRAIN W CONT Please provide this summary of care documentation to your next provider. Your primary care clinician is listed as Albert Simental. If you have any questions after today's visit, please call [phone].

## 2018-06-19 NOTE — COMMUNICATION BODY
Boubacar Quach is a 79 y.o., right handed female, with an established history of hypertension who comes in complaining of right leg dragging. She says that she is noted heaviness of the right leg for the past month or more. Her  states that she she has noticed some dragging or heaviness of the right leg for at least 2 months but in any case she noticed that about a month ago. She says it may have started after heavy workout with weights. She does not necessarily injure herself she just felt like it started after that workup. She says her slow walking has been going on for at least a month maybe 2 months. She has had no falling or tripping. There has been no of a tremor of the left hand intermittently. She says it occurs rarely. There is no tremor of the right side and no tremor of her legs. There is no family history of Parkinson's disease. Social History; the patient is  lives with her . No smoking alcohol or illicit drugs. She is a retired registered nanny. Family History; mother alive, 80, no medical issues. Father had diabetes and hypertension,  from heart disease. 3 sisters, all hypertensive. 2  from CHF, had hypertension. Current Outpatient Prescriptions   Medication Sig Dispense Refill    rOPINIRole (REQUIP) 0.25 mg tablet Take  by mouth three (3) times daily.  calcium carbonate/vitamin D3 (CALCIUM + D PO) Take  by mouth.  GLUCOSAMINE HCL PO Take  by mouth.  cholecalciferol, VITAMIN D3, (VITAMIN D3) 5,000 unit tab tablet Take  by mouth daily.  cyanocobalamin, vitamin B-12, (VITAMIN B-12 PO) Take  by mouth.  amLODIPine (NORVASC) 5 mg tablet Take 1 Tab by mouth daily. 90 Tab 0    valsartan-hydroCHLOROthiazide (DIOVAN HCT) 160-25 mg per tablet Take 1 Tab by mouth daily. Indications: hypertension 90 Tab 0    atorvastatin (LIPITOR) 20 mg tablet Take 1 Tab by mouth daily.  30 Tab 5       Past Medical History:   Diagnosis Date    Bladder perforation, intraoperative     during hysterectomy    Hyperlipidemia     Hypertension     Osteopenia     Prediabetes        Past Surgical History:   Procedure Laterality Date    HX  SECTION      HX JEROD AND BSO      NV EXTRAC ERUPTED TOOTH/EXPOSED ROOT         No Known Allergies    Patient Active Problem List   Diagnosis Code    Essential hypertension with goal blood pressure less than 140/90 I10    Osteopenia M85.80    Dyslipidemia E78.5    Prediabetes R73.03    Frequent urination at night R35.1    Leg heaviness R29.898         Review of Systems:   As above otherwise 11 point review of systems negative including;   Constitutional no fever or chills  Skin denies rash or itching  HENT  Denies tinnitus, hearing lose  Eyes denies diplopia vision lose  Respiratory denies shortness of breath  Cardiovascular denies chest pain, dyspnea on exertion  Gastrointestinal denies nausea, vomiting, diarrhea, constipation  Genitourinary denies incontinence  Musculoskeletal denies joint pain or swelling  Endocrine denies weight change  Hematology denies easy bruising or bleeding   Neurological as above in HPI      PHYSICAL EXAMINATION:      VITAL SIGNS:    Visit Vitals    Ht 5' 8\" (1.727 m)    Wt 72.6 kg (160 lb)    BMI 24.33 kg/m2       GENERAL: The patient is well developed, well nourished, and in no apparent distress. EXTREMITIES: No clubbing, cyanosis, or edema is identified. Pulses 2+ and symmetrical.  Muscle tone is normal.  HEAD:   Ear, nose, and throat appear to be without trauma. The patient is normocephalic. NEUROLOGIC EXAMINATION    MENTAL STATUS: The patient is awake, alert, and oriented x 4. Fund of knowledge is adequate. Speech is fluent and memory appears to be intact, both long and short term. CRANIAL NERVES: II - Visual fields are full to confrontation. Funduscopic examination reveals flat disks bilaterally.  Pupils are both 4 mm and briskly reactive to light and accommodation. III, IV, VI - Extraocular movements are intact and there is no nystagmus. V - Facial sensation is intact to pinprick and light touch. VII - Face is symmetrical.   VIII - Hearing is present. IX, X, XII- Palate rises symmetrically. Gag is present. Tongue is in the midline. XI - Shoulder shrugging and head turning intact  MOTOR:  The patient is 5/5 in all four limbs without any drift. Fine finger movements are symmetrical.  Isolated motor group testing reveals no focal abnormalities. Tone is normal.  Sensory examination is intact to pinprick, light touch and position sense testing. Reflexes are 2+ and symmetrical. Plantars are down going. Cerebellar examination reveals no gross ataxia or dysmetria. Gait is abnormal, she seems to drag the right side behind her and there's decreased arm swing on the right. She has postural instability. Final result (Exam End: 5/29/2018 12:41 PM) Open    Study Result   Head CT without contrast     HISTORY: High blood pressure, dizziness and bilateral weakness right greater  than left with questionable recent facial droop  COMPARISON: CT head 7/16/2015     Technique: CT images of the head were obtained. All CT scans at this facility  are performed using dose optimization technique as appropriate to the performed  exam, to include automated exposure control, adjustment of the mA and/or kV  according to patient's size (Including appropriate matching for site-specific  examinations), or use of iterative reconstruction technique.     FINDINGS:   No intracranial hemorrhage, extra-axial fluid collection or mass effect. No  shift of midline structures. No evidence for acute ischemia. Intact osseous structures. The visualized paranasal air sinuses are aerated.     IMPRESSION  IMPRESSION:     No CT evidence of acute intracranial abnormality.    -If clinical concern for acute ischemia MRI is more sensitive for further  evaluation.            I have reviewed the above imagines myself. CBC:   Lab Results   Component Value Date/Time    WBC 6.9 05/29/2018 01:07 PM    RBC 4.41 05/29/2018 01:07 PM    HGB 13.4 05/29/2018 01:07 PM    HCT 39.0 05/29/2018 01:07 PM    PLATELET 925 29/82/0632 01:07 PM     BMP:   Lab Results   Component Value Date/Time    Glucose 102 (H) 05/29/2018 01:07 PM    Sodium 139 05/29/2018 01:07 PM    Potassium 3.6 05/29/2018 01:07 PM    Chloride 106 05/29/2018 01:07 PM    CO2 29 05/29/2018 01:07 PM    BUN 12 05/29/2018 01:07 PM    Creatinine 0.77 05/29/2018 01:07 PM    Calcium 9.6 05/29/2018 01:07 PM     CMP:   Lab Results   Component Value Date/Time    Glucose 102 (H) 05/29/2018 01:07 PM    Sodium 139 05/29/2018 01:07 PM    Potassium 3.6 05/29/2018 01:07 PM    Chloride 106 05/29/2018 01:07 PM    CO2 29 05/29/2018 01:07 PM    BUN 12 05/29/2018 01:07 PM    Creatinine 0.77 05/29/2018 01:07 PM    Calcium 9.6 05/29/2018 01:07 PM    Anion gap 4 05/29/2018 01:07 PM    BUN/Creatinine ratio 16 05/29/2018 01:07 PM    Alk. phosphatase 108 10/31/2017 12:21 PM    Protein, total 8.2 10/31/2017 12:21 PM    Albumin 4.3 10/31/2017 12:21 PM    Globulin 3.9 10/31/2017 12:21 PM    A-G Ratio 1.1 10/31/2017 12:21 PM     Coagulation: No results found for: PTP, INR, APTT, PTTT  Cardiac markers: No results found for: CPK, CKND1, ROQUE        Impression: But seems like early Parkinson's disease in this patient who has risk factors including none. My only worrisome sign at this point is that her Parkinson's disease seems to be involving the right side only with the right sided dragging of the leg and decreased arm swing. Currently I find no tremor but the cogwheel rigidity appears to be bilateral.  She also has a history of what sounds like some restless leg syndrome which has gotten better on a low-dose of Requip. Plan: Going to get an MRI scan of the brain to make sure were not missing a small stroke.   The fact that she has parkinsonism only on one side is a concern but not necessarily uncommon. I am going to place her on a low-dose of Sinemet at this time to help with her ambulation. We will keep the Requip at its current dose just to make sure she does not have any trouble with her restlessness at night. Return visit here in about 4 weeks. PLEASE NOTE:   This document has been produced using voice recognition software. Unrecognized errors in transcription may be present.

## 2018-06-19 NOTE — PROGRESS NOTES
Jodi Pena is a 79 y.o., right handed female, with an established history of hypertension who comes in complaining of right leg dragging. She says that she is noted heaviness of the right leg for the past month or more. Her  states that she she has noticed some dragging or heaviness of the right leg for at least 2 months but in any case she noticed that about a month ago. She says it may have started after heavy workout with weights. She does not necessarily injure herself she just felt like it started after that workup. She says her slow walking has been going on for at least a month maybe 2 months. She has had no falling or tripping. There has been no of a tremor of the left hand intermittently. She says it occurs rarely. There is no tremor of the right side and no tremor of her legs. There is no family history of Parkinson's disease. Social History; the patient is  lives with her . No smoking alcohol or illicit drugs. She is a retired registered nanny. Family History; mother alive, 80, no medical issues. Father had diabetes and hypertension,  from heart disease. 3 sisters, all hypertensive. 2  from CHF, had hypertension. Current Outpatient Prescriptions   Medication Sig Dispense Refill    rOPINIRole (REQUIP) 0.25 mg tablet Take  by mouth three (3) times daily.  calcium carbonate/vitamin D3 (CALCIUM + D PO) Take  by mouth.  GLUCOSAMINE HCL PO Take  by mouth.  cholecalciferol, VITAMIN D3, (VITAMIN D3) 5,000 unit tab tablet Take  by mouth daily.  cyanocobalamin, vitamin B-12, (VITAMIN B-12 PO) Take  by mouth.  amLODIPine (NORVASC) 5 mg tablet Take 1 Tab by mouth daily. 90 Tab 0    valsartan-hydroCHLOROthiazide (DIOVAN HCT) 160-25 mg per tablet Take 1 Tab by mouth daily. Indications: hypertension 90 Tab 0    atorvastatin (LIPITOR) 20 mg tablet Take 1 Tab by mouth daily.  30 Tab 5       Past Medical History:   Diagnosis Date    Bladder perforation, intraoperative     during hysterectomy    Hyperlipidemia     Hypertension     Osteopenia     Prediabetes        Past Surgical History:   Procedure Laterality Date    HX  SECTION      HX JEROD AND BSO      NC EXTRAC ERUPTED TOOTH/EXPOSED ROOT         No Known Allergies    Patient Active Problem List   Diagnosis Code    Essential hypertension with goal blood pressure less than 140/90 I10    Osteopenia M85.80    Dyslipidemia E78.5    Prediabetes R73.03    Frequent urination at night R35.1    Leg heaviness R29.898         Review of Systems:   As above otherwise 11 point review of systems negative including;   Constitutional no fever or chills  Skin denies rash or itching  HENT  Denies tinnitus, hearing lose  Eyes denies diplopia vision lose  Respiratory denies shortness of breath  Cardiovascular denies chest pain, dyspnea on exertion  Gastrointestinal denies nausea, vomiting, diarrhea, constipation  Genitourinary denies incontinence  Musculoskeletal denies joint pain or swelling  Endocrine denies weight change  Hematology denies easy bruising or bleeding   Neurological as above in HPI      PHYSICAL EXAMINATION:      VITAL SIGNS:    Visit Vitals    Ht 5' 8\" (1.727 m)    Wt 72.6 kg (160 lb)    BMI 24.33 kg/m2       GENERAL: The patient is well developed, well nourished, and in no apparent distress. EXTREMITIES: No clubbing, cyanosis, or edema is identified. Pulses 2+ and symmetrical.  Muscle tone is normal.  HEAD:   Ear, nose, and throat appear to be without trauma. The patient is normocephalic. NEUROLOGIC EXAMINATION    MENTAL STATUS: The patient is awake, alert, and oriented x 4. Fund of knowledge is adequate. Speech is fluent and memory appears to be intact, both long and short term. CRANIAL NERVES: II - Visual fields are full to confrontation. Funduscopic examination reveals flat disks bilaterally.  Pupils are both 4 mm and briskly reactive to light and accommodation. III, IV, VI - Extraocular movements are intact and there is no nystagmus. V - Facial sensation is intact to pinprick and light touch. VII - Face is symmetrical.   VIII - Hearing is present. IX, X, XII- Palate rises symmetrically. Gag is present. Tongue is in the midline. XI - Shoulder shrugging and head turning intact  MOTOR:  The patient is 5/5 in all four limbs without any drift. Fine finger movements are symmetrical.  Isolated motor group testing reveals no focal abnormalities. Tone is normal.  Sensory examination is intact to pinprick, light touch and position sense testing. Reflexes are 2+ and symmetrical. Plantars are down going. Cerebellar examination reveals no gross ataxia or dysmetria. Gait is abnormal, she seems to drag the right side behind her and there's decreased arm swing on the right. She has postural instability. Final result (Exam End: 5/29/2018 12:41 PM) Open    Study Result   Head CT without contrast     HISTORY: High blood pressure, dizziness and bilateral weakness right greater  than left with questionable recent facial droop  COMPARISON: CT head 7/16/2015     Technique: CT images of the head were obtained. All CT scans at this facility  are performed using dose optimization technique as appropriate to the performed  exam, to include automated exposure control, adjustment of the mA and/or kV  according to patient's size (Including appropriate matching for site-specific  examinations), or use of iterative reconstruction technique.     FINDINGS:   No intracranial hemorrhage, extra-axial fluid collection or mass effect. No  shift of midline structures. No evidence for acute ischemia. Intact osseous structures. The visualized paranasal air sinuses are aerated.     IMPRESSION  IMPRESSION:     No CT evidence of acute intracranial abnormality.    -If clinical concern for acute ischemia MRI is more sensitive for further  evaluation.            I have reviewed the above imagines myself. CBC:   Lab Results   Component Value Date/Time    WBC 6.9 05/29/2018 01:07 PM    RBC 4.41 05/29/2018 01:07 PM    HGB 13.4 05/29/2018 01:07 PM    HCT 39.0 05/29/2018 01:07 PM    PLATELET 808 12/31/9050 01:07 PM     BMP:   Lab Results   Component Value Date/Time    Glucose 102 (H) 05/29/2018 01:07 PM    Sodium 139 05/29/2018 01:07 PM    Potassium 3.6 05/29/2018 01:07 PM    Chloride 106 05/29/2018 01:07 PM    CO2 29 05/29/2018 01:07 PM    BUN 12 05/29/2018 01:07 PM    Creatinine 0.77 05/29/2018 01:07 PM    Calcium 9.6 05/29/2018 01:07 PM     CMP:   Lab Results   Component Value Date/Time    Glucose 102 (H) 05/29/2018 01:07 PM    Sodium 139 05/29/2018 01:07 PM    Potassium 3.6 05/29/2018 01:07 PM    Chloride 106 05/29/2018 01:07 PM    CO2 29 05/29/2018 01:07 PM    BUN 12 05/29/2018 01:07 PM    Creatinine 0.77 05/29/2018 01:07 PM    Calcium 9.6 05/29/2018 01:07 PM    Anion gap 4 05/29/2018 01:07 PM    BUN/Creatinine ratio 16 05/29/2018 01:07 PM    Alk. phosphatase 108 10/31/2017 12:21 PM    Protein, total 8.2 10/31/2017 12:21 PM    Albumin 4.3 10/31/2017 12:21 PM    Globulin 3.9 10/31/2017 12:21 PM    A-G Ratio 1.1 10/31/2017 12:21 PM     Coagulation: No results found for: PTP, INR, APTT, PTTT  Cardiac markers: No results found for: CPK, CKND1, ROQUE        Impression: But seems like early Parkinson's disease in this patient who has risk factors including none. My only worrisome sign at this point is that her Parkinson's disease seems to be involving the right side only with the right sided dragging of the leg and decreased arm swing. Currently I find no tremor but the cogwheel rigidity appears to be bilateral.  She also has a history of what sounds like some restless leg syndrome which has gotten better on a low-dose of Requip. Plan: Going to get an MRI scan of the brain to make sure were not missing a small stroke.   The fact that she has parkinsonism only on one side is a concern but not necessarily uncommon. I am going to place her on a low-dose of Sinemet at this time to help with her ambulation. We will keep the Requip at its current dose just to make sure she does not have any trouble with her restlessness at night. Return visit here in about 4 weeks. PLEASE NOTE:   This document has been produced using voice recognition software. Unrecognized errors in transcription may be present.

## 2018-06-30 ENCOUNTER — HOSPITAL ENCOUNTER (OUTPATIENT)
Age: 68
Discharge: HOME | End: 2018-06-30
Attending: PSYCHIATRY & NEUROLOGY
Payer: MEDICARE

## 2018-06-30 DIAGNOSIS — G20 PARKINSON DISEASE (HCC): ICD-10-CM

## 2018-06-30 LAB — CREAT UR-MCNC: 0.6 MG/DL (ref 0.6–1.3)

## 2018-06-30 PROCEDURE — 70553 MRI BRAIN STEM W/O & W/DYE: CPT

## 2018-06-30 PROCEDURE — A9575 INJ GADOTERATE MEGLUMI 0.1ML: HCPCS | Performed by: PSYCHIATRY & NEUROLOGY

## 2018-06-30 PROCEDURE — 74011250636 HC RX REV CODE- 250/636: Performed by: PSYCHIATRY & NEUROLOGY

## 2018-06-30 PROCEDURE — 82565 ASSAY OF CREATININE: CPT

## 2018-06-30 RX ORDER — GADOTERATE MEGLUMINE 376.9 MG/ML
15 INJECTION INTRAVENOUS
Status: COMPLETED | OUTPATIENT
Start: 2018-06-30 | End: 2018-06-30

## 2018-06-30 RX ADMIN — GADOTERATE MEGLUMINE 15 ML: 376.9 INJECTION INTRAVENOUS at 11:00

## 2018-07-02 ENCOUNTER — TELEPHONE (OUTPATIENT)
Dept: NEUROLOGY | Age: 68
End: 2018-07-02

## 2018-07-03 NOTE — TELEPHONE ENCOUNTER
Gave patient MRI results. She would like to know what her next steps are. She is still having trouble with her leg/gait. Informed Patient I would send this message to Dr. Nicola Monroy and call her back with his response. Patient verbalized understanding.
Helen Colvin MD                  I'm treating her for Parkinson's diease.  She'll need to have her medications adjusted when she returns (Routing comment)         Spoke with Ms. Frida Minaya and informed her of Dr. Belkys Contreras response. Patient verbalized understanding.
- - -

## 2018-07-06 ENCOUNTER — PATIENT OUTREACH (OUTPATIENT)
Dept: FAMILY MEDICINE CLINIC | Age: 68
End: 2018-07-06

## 2018-07-24 ENCOUNTER — TELEPHONE (OUTPATIENT)
Dept: NEUROLOGY | Age: 68
End: 2018-07-24

## 2018-07-24 NOTE — TELEPHONE ENCOUNTER
Spoke with patient verified name and , informed of MD recommendations. Patient acknowledged understanding.

## 2018-07-24 NOTE — TELEPHONE ENCOUNTER
Spoke with patient verified name and , reports having hard time with constipation with new medication, Sinemet. She said no pain yet she has to push very hard to go and even used Dulcolax suppository. Patient request to know if she needs to cut down on medication or any suggestion that would help constipation. Please advise.

## 2018-08-07 ENCOUNTER — TELEPHONE (OUTPATIENT)
Dept: FAMILY MEDICINE CLINIC | Age: 68
End: 2018-08-07

## 2018-08-07 NOTE — TELEPHONE ENCOUNTER
I called and spoke with pt did give two pt identifiers and I explained to pt about the recall for the medication Valsartan and that pt need to contact her pharmacy. Pt states that she has to go there today and  medication and she will ask them about it.

## 2018-08-20 DIAGNOSIS — I10 ESSENTIAL HYPERTENSION WITH GOAL BLOOD PRESSURE LESS THAN 140/90: Chronic | ICD-10-CM

## 2018-08-21 ENCOUNTER — TELEPHONE (OUTPATIENT)
Dept: FAMILY MEDICINE CLINIC | Age: 68
End: 2018-08-21

## 2018-08-21 NOTE — TELEPHONE ENCOUNTER
Received call. Verified name and . Spoke with patient. Per patient applied for disability and denied. Per patient spoke of leg pain and she see Dr. Selvin Cruz and Parkinson in which see's Dr. Lynn Silvestre. Patient concern about denial. Notified patient, she may need to express concerns with those offices. Patient reminded of appointment on 2018. Patient had no further questions or concerns.

## 2018-08-21 NOTE — TELEPHONE ENCOUNTER
Pt was denied for disability and received a letter stating that she needed to contact her drs to make sure everything was in the chart right. She would like to discuss this with JEANNE Garcia. Please advise.

## 2018-08-26 RX ORDER — VALSARTAN AND HYDROCHLOROTHIAZIDE 160; 25 MG/1; MG/1
TABLET ORAL
Qty: 90 TAB | Refills: 0 | Status: SHIPPED | OUTPATIENT
Start: 2018-08-26 | End: 2018-11-08 | Stop reason: SDUPTHER

## 2018-09-04 ENCOUNTER — HOSPITAL ENCOUNTER (OUTPATIENT)
Dept: LAB | Age: 68
Discharge: HOME | End: 2018-09-04
Payer: MEDICARE

## 2018-09-04 DIAGNOSIS — E78.5 DYSLIPIDEMIA: ICD-10-CM

## 2018-09-04 LAB
CHOLEST SERPL-MCNC: 275 MG/DL
HDLC SERPL-MCNC: 57 MG/DL (ref 40–60)
HDLC SERPL: 4.8 {RATIO} (ref 0–5)
LDLC SERPL CALC-MCNC: 202.8 MG/DL (ref 0–100)
LIPID PROFILE,FLP: ABNORMAL
TRIGL SERPL-MCNC: 76 MG/DL (ref ?–150)
VLDLC SERPL CALC-MCNC: 15.2 MG/DL

## 2018-09-04 PROCEDURE — 80061 LIPID PANEL: CPT | Performed by: PHYSICIAN ASSISTANT

## 2018-09-04 PROCEDURE — 36415 COLL VENOUS BLD VENIPUNCTURE: CPT | Performed by: PHYSICIAN ASSISTANT

## 2018-09-05 ENCOUNTER — OFFICE VISIT (OUTPATIENT)
Dept: FAMILY MEDICINE CLINIC | Age: 68
End: 2018-09-05

## 2018-09-05 VITALS
RESPIRATION RATE: 16 BRPM | WEIGHT: 155.38 LBS | HEART RATE: 78 BPM | OXYGEN SATURATION: 99 % | SYSTOLIC BLOOD PRESSURE: 130 MMHG | DIASTOLIC BLOOD PRESSURE: 76 MMHG | BODY MASS INDEX: 23.62 KG/M2 | TEMPERATURE: 98.4 F

## 2018-09-05 DIAGNOSIS — G20 PARKINSON DISEASE (HCC): ICD-10-CM

## 2018-09-05 DIAGNOSIS — I10 ESSENTIAL HYPERTENSION WITH GOAL BLOOD PRESSURE LESS THAN 140/90: Primary | ICD-10-CM

## 2018-09-05 DIAGNOSIS — E78.5 DYSLIPIDEMIA: ICD-10-CM

## 2018-09-05 RX ORDER — ROSUVASTATIN CALCIUM 20 MG/1
20 TABLET, COATED ORAL
Qty: 90 TAB | Refills: 0 | Status: SHIPPED | OUTPATIENT
Start: 2018-09-05 | End: 2019-06-19 | Stop reason: SDUPTHER

## 2018-09-05 NOTE — MR AVS SNAPSHOT
[facility] [address] 
[phone] Patient: Stacy Neal MRN: [id number] OUR:1[birth year] Visit Information Date & Time Provider Department Dept. Phone Encounter #  
 9/5/2018 12:00 PM Randye Merlin, [address] [id number] Follow-up Instructions Return in about 6 months (around 3/5/2019) for Routine Care, fastin labs in 3 month. Your Appointments 9/7/2018 10:40 AM  
Follow Up with Tasha Wang MD  
[facility] [address]) Appt Note: 4 week fu; LVM to rs provider out of office  kmb 8/16; rs'd from 8/21/18  kmb; rs'd from 10/2018  per pt request  
 [address] [place] [address] [id number] [phone]  
  
   
 [facility] [id number] Upcoming Health Maintenance Date Due Hepatitis C Screening 1950 DTaP/Tdap/Td series (1 - Tdap) 6/6/1973 ZOSTER VACCINE AGE 60> 4/6/2012 GLAUCOMA SCREENING Q2Y 6/6/2017 Influenza Age 5 to Adult 8/1/2018 Pneumococcal 65+ Low/Medium Risk (1 of 2 - PCV13) 4/20/2019* MEDICARE YEARLY EXAM 4/21/2019 FOBT Q 1 YEAR AGE 50-75 5/18/2019 BREAST CANCER SCRN MAMMOGRAM 4/5/2020 *Topic was postponed. The date shown is not the original due date. Allergies as of 9/5/2018  Review Complete On: 9/5/2018 By: Randye Merlin, PA No Known Allergies Current Immunizations  Reviewed on 9/5/2018 No immunizations on file. Reviewed by Homar Cooley on 9/5/2018 at 12:07 PM  
You Were Diagnosed With   
  
 Codes Comments Essential hypertension with goal blood pressure less than 140/90    -  Primary ICD-10-CM: I10 
ICD-9-CM: 401.9 Dyslipidemia     ICD-10-CM: E78.5 ICD-9-CM: 272.4 Parkinson disease ([address]     ICD-10-CM: G20 
ICD-9-CM: 332.0 Vitals BP Pulse Temp Resp Weight(growth percentile) SpO2 130/76 (BP 1 Location: Left arm, BP Patient Position: Sitting) 78 98.4 °F (36.9 °C) (Oral) 16 155 lb 6 oz (70.5 kg) 99% BMI OB Status Smoking Status 23.62 kg/m2 Postmenopausal Never Smoker Vitals History BMI and BSA Data Body Mass Index Body Surface Area  
 23.62 kg/m 2 1.84 m 2 Preferred Pharmacy Pharmacy Name Phone [address] [place] [address] [facility] [phone] Your Updated Medication List  
  
   
This list is accurate as of 9/5/18 12:47 PM.  Always use your most recent med list. amLODIPine 5 mg tablet Commonly known as:  Eaton Cater Take 1 Tab by mouth daily. CALCIUM + D PO Take  by mouth.  
  
 carbidopa-levodopa  mg per tablet Commonly known as:  SINEMET Take 1 Tab by mouth three (3) times daily. cholecalciferol (VITAMIN D3) 5,000 unit Tab tablet Commonly known as:  VITAMIN D3 Take  by mouth daily. GLUCOSAMINE HCL PO Take  by mouth. rOPINIRole 0.25 mg tablet Commonly known as:  Louellen Mould Take  by mouth three (3) times daily. rosuvastatin 20 mg tablet Commonly known as:  CRESTOR Take 1 Tab by mouth nightly. valsartan-hydroCHLOROthiazide 160-25 mg per tablet Commonly known as:  DIOVAN-HCT  
TAKE 1 TABLET BY MOUTH DAILY  
  
 VITAMIN B-12 PO Take  by mouth. Prescriptions Sent to Pharmacy Refills  
 rosuvastatin (CRESTOR) 20 mg tablet 0 Sig: Take 1 Tab by mouth nightly. Class: Normal  
 Pharmacy: [address] [place] [address] #: [phone] Route: Oral  
  
Follow-up Instructions Return in about 6 months (around 3/5/2019) for Routine Care, fastin labs in 3 month. To-Do List   
 Around 11/26/2018 Lab:  LIPID PANEL   
  
 11/26/2018 Lab:  METABOLIC PANEL, COMPREHENSIVE Patient Instructions A Healthy Lifestyle: Care Instructions Your Care Instructions A healthy lifestyle can help you feel good, stay at a healthy weight, and have plenty of energy for both work and play. A healthy lifestyle is something you can share with your whole family. A healthy lifestyle also can lower your risk for serious health problems, such as high blood pressure, heart disease, and diabetes. You can follow a few steps listed below to improve your health and the health of your family. Follow-up care is a key part of your treatment and safety. Be sure to make and go to all appointments, and call your doctor if you are having problems. It's also a good idea to know your test results and keep a list of the medicines you take. How can you care for yourself at home? · Do not eat too much sugar, fat, or fast foods. You can still have dessert and treats now and then. The goal is moderation. · Start small to improve your eating habits. Pay attention to portion sizes, drink less juice and soda pop, and eat more fruits and vegetables. ¨ Eat a healthy amount of food. A 3-ounce serving of meat, for example, is about the size of a deck of cards. Fill the rest of your plate with vegetables and whole grains. ¨ Limit the amount of soda and sports drinks you have every day. Drink more water when you are thirsty. ¨ Eat at least 5 servings of fruits and vegetables every day. It may seem like a lot, but it is not hard to reach this goal. A serving or helping is 1 piece of fruit, 1 cup of vegetables, or 2 cups of leafy, raw vegetables. Have an apple or some carrot sticks as an afternoon snack instead of a candy bar. Try to have fruits and/or vegetables at every meal. 
· Make exercise part of your daily routine. You may want to start with simple activities, such as walking, bicycling, or slow swimming. Try to be active 30 to 60 minutes every day.  You do not need to do all 30 to 60 minutes all at once. For example, you can exercise 3 times a day for 10 or 20 minutes. Moderate exercise is safe for most people, but it is always a good idea to talk to your doctor before starting an exercise program. 
· Keep moving. Laura Boys the lawn, work in the garden, or [employer]. Take the stairs instead of the elevator at work. · If you smoke, quit. People who smoke have an increased risk for heart attack, stroke, cancer, and other lung illnesses. Quitting is hard, but there are ways to boost your chance of quitting tobacco for good. ¨ Use nicotine gum, patches, or lozenges. ¨ Ask your doctor about stop-smoking programs and medicines. ¨ Keep trying. In addition to reducing your risk of diseases in the future, you will notice some benefits soon after you stop using tobacco. If you have shortness of breath or asthma symptoms, they will likely get better within a few weeks after you quit. · Limit how much alcohol you drink. Moderate amounts of alcohol (up to 2 drinks a day for men, 1 drink a day for women) are okay. But drinking too much can lead to liver problems, high blood pressure, and other health problems. Family health If you have a family, there are many things you can do together to improve your health. · Eat meals together as a family as often as possible. · Eat healthy foods. This includes fruits, vegetables, lean meats and dairy, and whole grains. · Include your family in your fitness plan. Most people think of activities such as jogging or tennis as the way to fitness, but there are many ways you and your family can be more active. Anything that makes you breathe hard and gets your heart pumping is exercise. Here are some tips: 
¨ Walk to do errands or to take your child to school or the bus. ¨ Go for a family bike ride after dinner instead of watching TV. Where can you learn more? Go to http://hugo-pee.info/. Enter U408 in the search box to learn more about \"A Healthy Lifestyle: Care Instructions. \" Current as of: December 7, 2017 Content Version: 11.7 © 8988-7931 "[employer]. Care instructions adapted under license by [employer] (which disclaims liability or warranty for this information). If you have questions about a medical condition or this instruction, always ask your healthcare professional. [address] any warranty or liability for your use of this information. Please provide this summary of care documentation to your next provider. Your primary care clinician is listed as Nadine Setter. If you have any questions after today's visit, please call [phone].

## 2018-09-05 NOTE — PROGRESS NOTES
1. Have you been to the ER, urgent care clinic since your last visit? Hospitalized since your last visit? No    2. Have you seen or consulted any other health care providers outside of the [address] [place] [address] since your last visit? Include any pap smears or colon screening.  No

## 2018-09-05 NOTE — PROGRESS NOTES
Patient: Davis Lopes MRN: [id number]  SSN: [id number]    YOB: 1950  Age: 79 y.o. Sex: female      Date of Service: 2018   Provider: JEANNE Sweeney   Office Location:   [address] Kishan azul, [address]  Office Phone: [phone]  Office Fax: [phone]      REASON FOR VISIT:   No chief complaint on file. VITALS:   Visit Vitals    /76 (BP 1 Location: Left arm, BP Patient Position: Sitting)    Pulse 78    Temp 98.4 °F (36.9 °C) (Oral)    Resp 16    Wt 155 lb 6 oz (70.5 kg)    SpO2 99%    BMI 23.62 kg/m2        MEDICATIONS:   Current Outpatient Prescriptions on File Prior to Visit   Medication Sig Dispense Refill    rOPINIRole (REQUIP) 0.25 mg tablet Take  by mouth three (3) times daily.  carbidopa-levodopa (SINEMET)  mg per tablet Take 1 Tab by mouth three (3) times daily. 90 Tab 5    calcium carbonate/vitamin D3 (CALCIUM + D PO) Take  by mouth.  GLUCOSAMINE HCL PO Take  by mouth.  cholecalciferol, VITAMIN D3, (VITAMIN D3) 5,000 unit tab tablet Take  by mouth daily.  cyanocobalamin, vitamin B-12, (VITAMIN B-12 PO) Take  by mouth.  amLODIPine (NORVASC) 5 mg tablet Take 1 Tab by mouth daily. 90 Tab 0    atorvastatin (LIPITOR) 20 mg tablet Take 1 Tab by mouth daily. 30 Tab 5    valsartan-hydroCHLOROthiazide (DIOVAN-HCT) 160-25 mg per tablet TAKE 1 TABLET BY MOUTH DAILY 90 Tab 0     No current facility-administered medications on file prior to visit.          ALLERGIES:   No Known Allergies     MEDICAL/SURGICAL HISTORY:  Past Medical History:   Diagnosis Date    Bladder perforation, intraoperative     during hysterectomy    Hyperlipidemia     Hypertension     Osteopenia     Prediabetes       Past Surgical History:   Procedure Laterality Date    HX  SECTION      HX JEROD AND BSO      ND EXTRAC ERUPTED TOOTH/EXPOSED ROOT          FAMILY HISTORY:  Family History   Problem Relation Age of Onset    Hypertension Mother     Hypertension Father     Hypertension Brother     Hypertension Brother     Kidney Disease Brother         SOCIAL HISTORY:  Social History   Substance Use Topics    Smoking status: Never Smoker    Smokeless tobacco: Never Used    Alcohol use No             HISTORY OF PRESENT ILLNESS: Maru Dale is a 79 y.o. female who presents to the office for a routine follow up visit.        Hypertension -   Patient takes Diovan -25 mg daily. She also takes amlodipine 10 mg \"every other day,\" as she notices that sometimes when she takes it daily, her blood pressure will drop very low and she feels dizzy. She does monitor her BP at home and for the most part it has been high 130s-low 140s/80s      Hyperlipidemia -  Last lipid panel done 9/4/18 showed total cholesterol 275, , HDL 57, and trig 76  She takes atorvastatin 20 mg daily, and reports compliance with this      Parkinson Disease -   Patient complains of difficulty walking since early January. She describes her gait as \"shuffled\" and feeling like she can't always pick her feet up. This causes her to stumble and trip, but fortunately she has not had any significant fall with injury. She describes occasionally feeling \"off balance\" when she walks as well. She was seen by Dr. Evan Kingsley on 6/19/18 and diagnosed with probable Parkinson's disease. She was started on Sinemet, and is due to follow up later this week.        REVIEW OF SYSTEMS:  Review of Systems   Constitutional: Negative for chills, fever and malaise/fatigue. Respiratory: Negative for cough, shortness of breath and wheezing. Cardiovascular: Negative for chest pain, palpitations and leg swelling. Gastrointestinal: Negative for abdominal pain, constipation, diarrhea, nausea and vomiting.         PHYSICAL EXAMINATION:  Physical Exam   Constitutional: She is oriented to person, place, and time and well-developed, well-nourished, and in no distress. Cardiovascular: Normal rate, regular rhythm and normal heart sounds. Exam reveals no gallop and no friction rub. No murmur heard. Pulmonary/Chest: Effort normal and breath sounds normal. She has no wheezes. She has no rales. Neurological: She is alert and oriented to person, place, and time. Skin: Skin is warm and dry. No rash noted. Psychiatric: Mood, memory and affect normal.        RESULTS:  No results found for this visit on 09/05/18. ASSESSMENT/PLAN:  Diagnoses and all orders for this visit:    1. Essential hypertension with goal blood pressure less than 140/90  - Stable on current regimen   - Discussed with patient that orthostatic hypotension can be common in Parkinson's patients, discussed signs and symptoms of this. Patient to notify me if she is experiencing symptomatic low BP readings     2. Dyslipidemia  - LDL still very high  - Will change statin to Crestor  - Repeat labs in 3 months  Orders:    -     rosuvastatin (CRESTOR) 20 mg tablet; Take 1 Tab by mouth nightly. -     METABOLIC PANEL, COMPREHENSIVE; Future  -     LIPID PANEL; Future    3. Parkinson disease [facility])  - Neurology notes and imaging reviewed  - Agree with continuation of physical therapy and home exercises   - Follow up on Friday as scheduled       Follow-up Disposition:  Return in about 6 months (around 3/5/2019) for Routine Care, fastin labs in 3 month. All questions answered. Patient expresses understanding and agrees with the plan as detailed above.     PATIENT CARE TEAM:   Patient Care Team:  JEANNE Mac as PCP - General (Physician Assistant)       JEANNE Mac   September 5, 2018   1:05 PM

## 2018-09-05 NOTE — PATIENT INSTRUCTIONS

## 2018-09-07 ENCOUNTER — OFFICE VISIT (OUTPATIENT)
Dept: NEUROLOGY | Age: 68
End: 2018-09-07

## 2018-09-07 VITALS
WEIGHT: 157 LBS | RESPIRATION RATE: 16 BRPM | TEMPERATURE: 98.5 F | HEART RATE: 75 BPM | OXYGEN SATURATION: 97 % | BODY MASS INDEX: 23.79 KG/M2 | HEIGHT: 68 IN | SYSTOLIC BLOOD PRESSURE: 178 MMHG | DIASTOLIC BLOOD PRESSURE: 84 MMHG

## 2018-09-07 DIAGNOSIS — G20 PARKINSON DISEASE (HCC): ICD-10-CM

## 2018-09-07 RX ORDER — ROPINIROLE 0.25 MG/1
TABLET, FILM COATED ORAL
Qty: 270 TAB | Refills: 6 | Status: SHIPPED | OUTPATIENT
Start: 2018-09-07 | End: 2020-01-09

## 2018-09-07 RX ORDER — CARBIDOPA AND LEVODOPA 25; 100 MG/1; MG/1
1 TABLET ORAL 3 TIMES DAILY
Qty: 90 TAB | Refills: 6 | Status: SHIPPED | OUTPATIENT
Start: 2018-09-07 | End: 2019-03-05 | Stop reason: SDUPTHER

## 2018-09-07 RX ORDER — ROPINIROLE 0.25 MG/1
0.25 TABLET, FILM COATED ORAL 3 TIMES DAILY
Qty: 90 TAB | Refills: 6 | Status: SHIPPED | OUTPATIENT
Start: 2018-09-07 | End: 2018-09-07 | Stop reason: SDUPTHER

## 2018-09-07 NOTE — MR AVS SNAPSHOT
[address] [address] [phone] Patient: Walt Marquez MRN: [id number] Visit Information Date & Time Provider Department Dept. Phone Encounter #  
 9/7/2018 10:40 AM Stephen Longoria MD [employer] [phone] [id number] Follow-up Instructions Return in about 6 months (around 3/7/2019). Follow-up and Disposition History Upcoming Health Maintenance Date Due Hepatitis C Screening 1950 DTaP/Tdap/Td series (1 - Tdap) 6/6/1973 ZOSTER VACCINE AGE 60> 4/6/2012 GLAUCOMA SCREENING Q2Y 6/6/2017 Pneumococcal 65+ Low/Medium Risk (1 of 2 - PCV13) 4/20/2019* Influenza Age 5 to Adult 9/1/2019* MEDICARE YEARLY EXAM 4/21/2019 FOBT Q 1 YEAR AGE 50-75 5/18/2019 BREAST CANCER SCRN MAMMOGRAM 4/5/2020 *Topic was postponed. The date shown is not the original due date. Allergies as of 9/7/2018  Review Complete On: 9/7/2018 By: Chuck Alberts LPN No Known Allergies Current Immunizations  Reviewed on 9/5/2018 No immunizations on file. Not reviewed this visit You Were Diagnosed With   
  
 Codes Comments [facility]     ICD-10-CM: G20 
ICD-9-CM: 332.0 Vitals BP Pulse Temp Resp Height(growth percentile) Weight(growth percentile) 178/84 (BP 1 Location: Right arm, BP Patient Position: Sitting) 75 98.5 °F (36.9 °C) (Oral) 16 5' 8\" (1.727 m) 157 lb (71.2 kg) SpO2 BMI OB Status Smoking Status 97% 23.87 kg/m2 Postmenopausal Never Smoker BMI and BSA Data Body Mass Index Body Surface Area  
 23.87 kg/m 2 1.85 m 2 Preferred Pharmacy Pharmacy Name Phone [address], [address] [place] [address] [facility] [phone] Your Updated Medication List  
  
   
This list is accurate as of 9/7/18 11:32 AM.  Always use your most recent med list.  
 amLODIPine 5 mg tablet Commonly known as:  Love Breach Take 1 Tab by mouth daily. CALCIUM + D PO Take  by mouth.  
  
 carbidopa-levodopa  mg per tablet Commonly known as:  SINEMET Take 1 Tab by mouth three (3) times daily. cholecalciferol (VITAMIN D3) 5,000 unit Tab tablet Commonly known as:  VITAMIN D3 Take  by mouth daily. GLUCOSAMINE HCL PO Take  by mouth. rOPINIRole 0.25 mg tablet Commonly known as:  Sera Schimke Take 1 Tab by mouth three (3) times daily. rosuvastatin 20 mg tablet Commonly known as:  CRESTOR Take 1 Tab by mouth nightly. valsartan-hydroCHLOROthiazide 160-25 mg per tablet Commonly known as:  DIOVAN-HCT  
TAKE 1 TABLET BY MOUTH DAILY  
  
 VITAMIN B-12 PO Take  by mouth. Prescriptions Sent to Pharmacy Refills  
 carbidopa-levodopa (SINEMET)  mg per tablet 6 Sig: Take 1 Tab by mouth three (3) times daily. Class: Normal  
 Pharmacy: [address] [place] [address] Ph #: [phone] Route: Oral  
 rOPINIRole (REQUIP) 0.25 mg tablet 6 Sig: Take 1 Tab by mouth three (3) times daily. Class: Normal  
 Pharmacy: [address] [place] [address] Ph #: [phone] Route: Oral  
  
Follow-up Instructions Return in about 6 months (around 3/7/2019). Please provide this summary of care documentation to your next provider. Your primary care clinician is listed as [facility]. If you have any questions after today's visit, please call [phone].

## 2018-09-07 NOTE — PROGRESS NOTES
Re:  [facility] visit     9/7/2018 11:24 AM    SSN: [id number]    Subjective:   Walt Marquez returns for follow up of her Parkinson's disease. She noticed a significant improvement in her standing posture and ambulation with the Sinemet. She's getting physical therapy for her knees, which has also helped with balance. Some sleepiness with the Sinemet. Medications:    Current Outpatient Prescriptions   Medication Sig Dispense Refill    rosuvastatin (CRESTOR) 20 mg tablet Take 1 Tab by mouth nightly. 90 Tab 0    valsartan-hydroCHLOROthiazide (DIOVAN-HCT) 160-25 mg per tablet TAKE 1 TABLET BY MOUTH DAILY 90 Tab 0    rOPINIRole (REQUIP) 0.25 mg tablet Take  by mouth three (3) times daily.  carbidopa-levodopa (SINEMET)  mg per tablet Take 1 Tab by mouth three (3) times daily. 90 Tab 5    calcium carbonate/vitamin D3 (CALCIUM + D PO) Take  by mouth.  GLUCOSAMINE HCL PO Take  by mouth.  cholecalciferol, VITAMIN D3, (VITAMIN D3) 5,000 unit tab tablet Take  by mouth daily.  cyanocobalamin, vitamin B-12, (VITAMIN B-12 PO) Take  by mouth.  amLODIPine (NORVASC) 5 mg tablet Take 1 Tab by mouth daily.  90 Tab 0       Vital signs:    Visit Vitals    /84 (BP 1 Location: Right arm, BP Patient Position: Sitting)    Pulse 75    Temp 98.5 °F (36.9 °C) (Oral)    Resp 16    Ht 5' 8\" (1.727 m)    Wt 71.2 kg (157 lb)    SpO2 97%    BMI 23.87 kg/m2       Review of Systems:   As above otherwise 11 point review of systems negative including;   Constitutional no fever or chills  Skin denies rash or itching  HEENT  Denies tinnitus, hearing lose  Eyes denies diplopia vision lose  Respiratory denies sortness of breath  Cardiovascular denies chest pain, dyspnea on exertion  Gastrointestinal denies nausea, vomiting, diarrhea, constipation  Genitourinary denies incontinence  Musculoskeletal denies joint pain or swelling  Endocrine denies weight change  Hematology denies easy bruising or bleeding   Neurological as above in HPI      Patient Active Problem List   Diagnosis Code    Essential hypertension with goal blood pressure less than 140/90 I10    Osteopenia M85.80    Dyslipidemia E78.5    Prediabetes R73.03    Frequent urination at night R35.1    Leg heaviness R29.898    Parkinson disease ([facility] G20         Objective: The patient is awake, alert, and oriented x 4. Fund of knowledge is adequate. Speech is fluent and memory is intact. Cranial Nerves: II - Visual fields are full to confrontation. III, IV, VI - Extraocular movements are intact. There is no nystagmus. V - Facial sensation is intact to pinprick. VII - Face is symmetrical.  VIII - Hearing is present. IX, X, XII - Palate is symmetrical.   XI - Shoulder shrugging and head turning intact  Motor: The patient moves all four limbs fairly well and symmetrically. Tone is normal. Reflexes are 2+ and symmetrical. Plantars are down going. Gait is normal and she turns fairly well.     CBC:   Lab Results   Component Value Date/Time    WBC 6.9 05/29/2018 01:07 PM    RBC 4.41 05/29/2018 01:07 PM    HGB 13.4 05/29/2018 01:07 PM    HCT 39.0 05/29/2018 01:07 PM    PLATELET 824 19/64/8457 01:07 PM     BMP:   Lab Results   Component Value Date/Time    Glucose 102 (H) 05/29/2018 01:07 PM    Sodium 139 05/29/2018 01:07 PM    Potassium 3.6 05/29/2018 01:07 PM    Chloride 106 05/29/2018 01:07 PM    CO2 29 05/29/2018 01:07 PM    BUN 12 05/29/2018 01:07 PM    Creatinine 0.77 05/29/2018 01:07 PM    Calcium 9.6 05/29/2018 01:07 PM     CMP:   Lab Results   Component Value Date/Time    Glucose 102 (H) 05/29/2018 01:07 PM    Sodium 139 05/29/2018 01:07 PM    Potassium 3.6 05/29/2018 01:07 PM    Chloride 106 05/29/2018 01:07 PM    CO2 29 05/29/2018 01:07 PM    BUN 12 05/29/2018 01:07 PM    Creatinine 0.77 05/29/2018 01:07 PM    Calcium 9.6 05/29/2018 01:07 PM    Anion gap 4 05/29/2018 01:07 PM    BUN/Creatinine ratio 16 05/29/2018 01:07 PM    Alk. phosphatase 108 10/31/2017 12:21 PM    Protein, total 8.2 10/31/2017 12:21 PM    Albumin 4.3 10/31/2017 12:21 PM    Globulin 3.9 10/31/2017 12:21 PM    A-G Ratio 1.1 10/31/2017 12:21 PM     Coagulation: No results found for: PTP, INR, APTT, PTTT  Cardiac markers: No results found for: CPK, CKND1, ROQUE    Assessment:  Early parkinson's disease, doing well with low dose of current medications. Plan:  Continue the same and see her back here in about 6 months. Sincerely,        Ronni Wolff.  Jose Mars M.D.

## 2018-09-07 NOTE — PROGRESS NOTES
Chief Complaint   Patient presents with    Follow-up    Neurologic Problem     Parkinson's     1. Have you been to the ER, urgent care clinic since your last visit? Hospitalized since your last visit? No    2. Have you seen or consulted any other health care providers outside of the [address] [place] [address] since your last visit? Include any pap smears or colon screening.  No

## 2018-10-12 DIAGNOSIS — G20 PARKINSON DISEASE (HCC): ICD-10-CM

## 2018-10-12 RX ORDER — ROPINIROLE 0.25 MG/1
TABLET, FILM COATED ORAL
Qty: 270 TAB | Refills: 6 | Status: CANCELLED | OUTPATIENT
Start: 2018-10-12

## 2018-10-12 NOTE — TELEPHONE ENCOUNTER
This pharmacy faxed over request for the following prescriptions to be filled:    Medication requested :   Requested Prescriptions     Pending Prescriptions Disp Refills    rOPINIRole (REQUIP) 0.25 mg tablet 270 Tab 6       PCP: [place] or Print: [place]  Mail order or Local pharmacy [phone]    Scheduled appointment if not seen by current providers in office: LOV: 09-5-18 NOV: Not scheduled

## 2018-10-15 NOTE — TELEPHONE ENCOUNTER
Refill request sent to provider for approval or denial. Last refill on 09/07/2018. Last office visit on 09/05/2018. Next appointment not scheduled.

## 2018-10-15 NOTE — TELEPHONE ENCOUNTER
Called home number. Verified name and . Spoke with patient. Patient notified of message below per provider. Patient understood the reason for call and had no further questions or concerns.

## 2018-11-08 DIAGNOSIS — I10 ESSENTIAL HYPERTENSION WITH GOAL BLOOD PRESSURE LESS THAN 140/90: Chronic | ICD-10-CM

## 2018-11-12 RX ORDER — VALSARTAN AND HYDROCHLOROTHIAZIDE 160; 25 MG/1; MG/1
TABLET ORAL
Qty: 90 TAB | Refills: 0 | Status: SHIPPED | OUTPATIENT
Start: 2018-11-12 | End: 2019-03-06 | Stop reason: SDUPTHER

## 2018-11-12 NOTE — TELEPHONE ENCOUNTER
Refill approved.  Please remind patient to have her labs drawn in the next few weeks, and to schedule an appt with me in March

## 2018-11-13 NOTE — TELEPHONE ENCOUNTER
Called home number. Verified name and . Spoke with patient. Notified of message below. Patient accepted appointment for 2019. Patient stated in [place], family emergency, due to mother. Patient stated will return home at the end of this month. Patient had no further questions or concerns.

## 2019-01-18 ENCOUNTER — DOCUMENTATION ONLY (OUTPATIENT)
Dept: FAMILY MEDICINE CLINIC | Age: 69
End: 2019-01-18

## 2019-01-18 ENCOUNTER — APPOINTMENT (OUTPATIENT)
Dept: GENERAL RADIOLOGY | Age: 69
End: 2019-01-18
Attending: EMERGENCY MEDICINE
Payer: MEDICARE

## 2019-01-18 ENCOUNTER — HOSPITAL ENCOUNTER (EMERGENCY)
Age: 69
Discharge: HOME | End: 2019-01-18
Attending: EMERGENCY MEDICINE
Payer: MEDICARE

## 2019-01-18 VITALS
DIASTOLIC BLOOD PRESSURE: 75 MMHG | HEIGHT: 68 IN | HEART RATE: 61 BPM | BODY MASS INDEX: 22.73 KG/M2 | WEIGHT: 150 LBS | TEMPERATURE: 98 F | SYSTOLIC BLOOD PRESSURE: 148 MMHG | RESPIRATION RATE: 19 BRPM | OXYGEN SATURATION: 99 %

## 2019-01-18 DIAGNOSIS — R07.9 CHEST PAIN, UNSPECIFIED TYPE: Primary | ICD-10-CM

## 2019-01-18 LAB
ALBUMIN SERPL-MCNC: 4.2 G/DL (ref 3.4–5)
ALBUMIN/GLOB SERPL: 1 {RATIO} (ref 0.8–1.7)
ALP SERPL-CCNC: 77 U/L (ref 45–117)
ALT SERPL-CCNC: 9 U/L (ref 13–56)
ANION GAP SERPL CALC-SCNC: 9 MMOL/L (ref 3–18)
AST SERPL-CCNC: 15 U/L (ref 15–37)
ATRIAL RATE: 72 BPM
BASOPHILS # BLD: 0 K/UL (ref 0–0.1)
BASOPHILS NFR BLD: 1 % (ref 0–2)
BILIRUB SERPL-MCNC: 0.7 MG/DL (ref 0.2–1)
BUN SERPL-MCNC: 13 MG/DL (ref 7–18)
BUN/CREAT SERPL: 17 (ref 12–20)
CALCIUM SERPL-MCNC: 9.6 MG/DL (ref 8.5–10.1)
CALCULATED P AXIS, ECG09: 64 DEGREES
CALCULATED R AXIS, ECG10: 63 DEGREES
CALCULATED T AXIS, ECG11: 55 DEGREES
CHLORIDE SERPL-SCNC: 102 MMOL/L (ref 100–108)
CK MB CFR SERPL CALC: NORMAL % (ref 0–4)
CK MB SERPL-MCNC: <1 NG/ML (ref 5–25)
CK SERPL-CCNC: 85 U/L (ref 26–192)
CO2 SERPL-SCNC: 28 MMOL/L (ref 21–32)
CREAT SERPL-MCNC: 0.76 MG/DL (ref 0.6–1.3)
DIAGNOSIS, 93000: NORMAL
DIFFERENTIAL METHOD BLD: NORMAL
EOSINOPHIL # BLD: 0.1 K/UL (ref 0–0.4)
EOSINOPHIL NFR BLD: 2 % (ref 0–5)
ERYTHROCYTE [DISTWIDTH] IN BLOOD BY AUTOMATED COUNT: 13.2 % (ref 11.6–14.5)
GLOBULIN SER CALC-MCNC: 4.1 G/DL (ref 2–4)
GLUCOSE SERPL-MCNC: 97 MG/DL (ref 74–99)
HCT VFR BLD AUTO: 39.3 % (ref 35–45)
HGB BLD-MCNC: 13 G/DL (ref 12–16)
LYMPHOCYTES # BLD: 2.1 K/UL (ref 0.9–3.6)
LYMPHOCYTES NFR BLD: 38 % (ref 21–52)
MCH RBC QN AUTO: 30.2 PG (ref 24–34)
MCHC RBC AUTO-ENTMCNC: 33.1 G/DL (ref 31–37)
MCV RBC AUTO: 91.2 FL (ref 74–97)
MONOCYTES # BLD: 0.4 K/UL (ref 0.05–1.2)
MONOCYTES NFR BLD: 6 % (ref 3–10)
NEUTS SEG # BLD: 2.9 K/UL (ref 1.8–8)
NEUTS SEG NFR BLD: 53 % (ref 40–73)
P-R INTERVAL, ECG05: 128 MS
PLATELET # BLD AUTO: 197 K/UL (ref 135–420)
PMV BLD AUTO: 11.6 FL (ref 9.2–11.8)
POTASSIUM SERPL-SCNC: 3.6 MMOL/L (ref 3.5–5.5)
PROT SERPL-MCNC: 8.3 G/DL (ref 6.4–8.2)
Q-T INTERVAL, ECG07: 388 MS
QRS DURATION, ECG06: 82 MS
QTC CALCULATION (BEZET), ECG08: 424 MS
RBC # BLD AUTO: 4.31 M/UL (ref 4.2–5.3)
SODIUM SERPL-SCNC: 139 MMOL/L (ref 136–145)
TROPONIN I SERPL-MCNC: <0.02 NG/ML (ref 0–0.06)
VENTRICULAR RATE, ECG03: 72 BPM
WBC # BLD AUTO: 5.6 K/UL (ref 4.6–13.2)

## 2019-01-18 PROCEDURE — 99284 EMERGENCY DEPT VISIT MOD MDM: CPT

## 2019-01-18 PROCEDURE — 82550 ASSAY OF CK (CPK): CPT

## 2019-01-18 PROCEDURE — 80053 COMPREHEN METABOLIC PANEL: CPT

## 2019-01-18 PROCEDURE — 85025 COMPLETE CBC W/AUTO DIFF WBC: CPT

## 2019-01-18 PROCEDURE — 74011250637 HC RX REV CODE- 250/637: Performed by: EMERGENCY MEDICINE

## 2019-01-18 PROCEDURE — 93005 ELECTROCARDIOGRAM TRACING: CPT

## 2019-01-18 PROCEDURE — 71045 X-RAY EXAM CHEST 1 VIEW: CPT

## 2019-01-18 RX ORDER — GUAIFENESIN 100 MG/5ML
324 LIQUID (ML) ORAL
Status: COMPLETED | OUTPATIENT
Start: 2019-01-18 | End: 2019-01-18

## 2019-01-18 RX ADMIN — ASPIRIN 81 MG CHEWABLE TABLET 324 MG: 81 TABLET CHEWABLE at 12:08

## 2019-01-18 NOTE — ED PROVIDER NOTES
EMERGENCY DEPARTMENT HISTORY AND PHYSICAL EXAM 
 
11:45 AM 
 
 
Date: 1/18/2019 Patient Name: Lupe Rodriguez History of Presenting Illness Chief Complaint Patient presents with  Chest Pain History Provided By: Patient Chief Complaint: CP 
Duration: 2 Days Timing:  Acute and Intermittent Location: R sided chest 
Quality: Pressure Severity: Moderate Modifying Factors: walking up the stairs Associated Symptoms: dizziness, R sided abd pain Additional History (Context): Lupe Rodriguez is a 79 y.o. female with arthritis, Parkinson's, HTN, hypercholesteremia who presents with acute and intermittent CP that began x2 days PTA and is described as pressure. Pt confirms associated sx of dizziness and R sided abdominal pain (CP shooting down R side of torso) and denies any N/V, SOB, cough, fever or appetite change. Pt states she just left PT with Kameron Runner (unaware of last name) and states she had a sharp shooting pain in chest that lasted only a few seconds. She is compliant with all medications. No other concerns or symptoms at this time. PCP: JEANNE Vargas Current Outpatient Medications Medication Sig Dispense Refill  valsartan-hydroCHLOROthiazide (DIOVAN-HCT) 160-25 mg per tablet TAKE 1 TABLET BY MOUTH DAILY 90 Tab 0  carbidopa-levodopa (SINEMET)  mg per tablet Take 1 Tab by mouth three (3) times daily. 90 Tab 6  
 rosuvastatin (CRESTOR) 20 mg tablet Take 1 Tab by mouth nightly. 90 Tab 0  
 calcium carbonate/vitamin D3 (CALCIUM + D PO) Take  by mouth.  cholecalciferol, VITAMIN D3, (VITAMIN D3) 5,000 unit tab tablet Take  by mouth daily.  cyanocobalamin, vitamin B-12, (VITAMIN B-12 PO) Take  by mouth.  amLODIPine (NORVASC) 5 mg tablet Take 1 Tab by mouth daily. 90 Tab 0  
 rOPINIRole (REQUIP) 0.25 mg tablet TAKE 1 TABLET BY MOUTH THREE TIMES DAILY 270 Tab 6 Past History Past Medical History: 
Past Medical History:  
Diagnosis Date  Bladder perforation, intraoperative   
 during hysterectomy  Hyperlipidemia  Hypertension   Osteopenia  Prediabetes Past Surgical History: 
Past Surgical History:  
Procedure Laterality Date  HX  SECTION    
 HX JEROD AND BSO  CT EXTRAC ERUPTED TOOTH/EXPOSED ROOT Family History: 
Family History Problem Relation Age of Onset  Hypertension Mother  Hypertension Father  Hypertension Brother  Hypertension Brother  Kidney Disease Brother Social History: 
Social History Tobacco Use  Smoking status: Never Smoker  Smokeless tobacco: Never Used Substance Use Topics  Alcohol use: No  
 Drug use: No  
 
 
Allergies: 
No Known Allergies Review of Systems Review of Systems Constitutional: Negative. Negative for chills, diaphoresis and fever. HENT: Negative. Negative for congestion, rhinorrhea and sore throat. Eyes: Negative. Negative for pain, discharge and redness. Respiratory: Negative. Negative for cough, chest tightness, shortness of breath and wheezing. Cardiovascular: Positive for chest pain (R sided). Gastrointestinal: Positive for abdominal pain (R sided). Negative for constipation, diarrhea, nausea and vomiting. Genitourinary: Negative. Negative for dysuria, flank pain, frequency, hematuria and urgency. Musculoskeletal: Negative. Negative for back pain and neck pain. Skin: Negative. Negative for rash. Neurological: Positive for dizziness. Negative for syncope, weakness, numbness and headaches. Psychiatric/Behavioral: Negative. All other systems reviewed and are negative. Physical Exam  
 
Visit Vitals /75 Pulse 61 Temp 98 °F (36.7 °C) Resp 19 Ht 5' 8.25\" (1.734 m) Wt 68 kg (150 lb) SpO2 99% BMI 22.64 kg/m² Physical Exam  
Constitutional: She is oriented to person, place, and time.  She appears well-developed and well-nourished. Non-toxic appearance. She does not have a sickly appearance. She does not appear ill. No distress. HENT:  
Head: Normocephalic and atraumatic. Mouth/Throat: Oropharynx is clear and moist. No oropharyngeal exudate. Eyes: Conjunctivae and EOM are normal. Pupils are equal, round, and reactive to light. No scleral icterus. Neck: Normal range of motion. Neck supple. No hepatojugular reflux and no JVD present. No tracheal deviation present. No thyromegaly present. Cardiovascular: Normal rate, regular rhythm, S1 normal, S2 normal, normal heart sounds, intact distal pulses and normal pulses. Exam reveals no gallop, no S3 and no S4. No murmur heard. Pulses: 
     Radial pulses are 2+ on the right side, and 2+ on the left side. Dorsalis pedis pulses are 2+ on the right side, and 2+ on the left side. Pulmonary/Chest: Effort normal and breath sounds normal. No respiratory distress. She has no decreased breath sounds. She has no wheezes. She has no rhonchi. She has no rales. Abdominal: Soft. Normal appearance and bowel sounds are normal. She exhibits no distension and no mass. There is no hepatosplenomegaly. There is no tenderness. There is no rigidity, no rebound, no guarding, no CVA tenderness, no tenderness at McBurney's point and negative Robbins's sign. Musculoskeletal: Normal range of motion. She exhibits no edema or tenderness. Lymphadenopathy:  
     Head (right side): No submental, no submandibular, no preauricular and no occipital adenopathy present. Head (left side): No submental, no submandibular, no preauricular and no occipital adenopathy present. She has no cervical adenopathy. Right: No supraclavicular adenopathy present. Left: No supraclavicular adenopathy present. Neurological: She is alert and oriented to person, place, and time. She has normal strength and normal reflexes. She is not disoriented.  No cranial nerve deficit or sensory deficit. Coordination and gait normal. GCS eye subscore is 4. GCS verbal subscore is 5. GCS motor subscore is 6. Skin: Skin is warm, dry and intact. No rash noted. She is not diaphoretic. Psychiatric: She has a normal mood and affect. Her speech is normal and behavior is normal. Judgment and thought content normal. Cognition and memory are normal.  
Nursing note and vitals reviewed. Diagnostic Study Results Labs - Recent Results (from the past 12 hour(s)) EKG, 12 LEAD, INITIAL Collection Time: 01/18/19 11:52 AM  
Result Value Ref Range Ventricular Rate 72 BPM  
 Atrial Rate 72 BPM  
 P-R Interval 128 ms QRS Duration 82 ms Q-T Interval 388 ms QTC Calculation (Bezet) 424 ms Calculated P Axis 64 degrees Calculated R Axis 63 degrees Calculated T Axis 55 degrees Diagnosis Normal sinus rhythm Normal ECG When compared with ECG of 29-MAY-2018 14:26, No significant change was found CBC WITH AUTOMATED DIFF Collection Time: 01/18/19 12:15 PM  
Result Value Ref Range WBC 5.6 4.6 - 13.2 K/uL  
 RBC 4.31 4.20 - 5.30 M/uL  
 HGB 13.0 12.0 - 16.0 g/dL HCT 39.3 35.0 - 45.0 % MCV 91.2 74.0 - 97.0 FL  
 MCH 30.2 24.0 - 34.0 PG  
 MCHC 33.1 31.0 - 37.0 g/dL  
 RDW 13.2 11.6 - 14.5 % PLATELET 998 772 - 295 K/uL MPV 11.6 9.2 - 11.8 FL  
 NEUTROPHILS 53 40 - 73 % LYMPHOCYTES 38 21 - 52 % MONOCYTES 6 3 - 10 % EOSINOPHILS 2 0 - 5 % BASOPHILS 1 0 - 2 %  
 ABS. NEUTROPHILS 2.9 1.8 - 8.0 K/UL  
 ABS. LYMPHOCYTES 2.1 0.9 - 3.6 K/UL  
 ABS. MONOCYTES 0.4 0.05 - 1.2 K/UL  
 ABS. EOSINOPHILS 0.1 0.0 - 0.4 K/UL  
 ABS. BASOPHILS 0.0 0.0 - 0.1 K/UL  
 DF AUTOMATED METABOLIC PANEL, COMPREHENSIVE Collection Time: 01/18/19 12:15 PM  
Result Value Ref Range Sodium 139 136 - 145 mmol/L Potassium 3.6 3.5 - 5.5 mmol/L Chloride 102 100 - 108 mmol/L  
 CO2 28 21 - 32 mmol/L  Anion gap 9 3.0 - 18 mmol/L  
 Glucose 97 74 - 99 mg/dL BUN 13 7.0 - 18 MG/DL Creatinine 0.76 0.6 - 1.3 MG/DL  
 BUN/Creatinine ratio 17 12 - 20 GFR est AA >60 >60 ml/min/1.73m2 GFR est non-AA >60 >60 ml/min/1.73m2 Calcium 9.6 8.5 - 10.1 MG/DL Bilirubin, total 0.7 0.2 - 1.0 MG/DL  
 ALT (SGPT) 9 (L) 13 - 56 U/L  
 AST (SGOT) 15 15 - 37 U/L Alk. phosphatase 77 45 - 117 U/L Protein, total 8.3 (H) 6.4 - 8.2 g/dL Albumin 4.2 3.4 - 5.0 g/dL Globulin 4.1 (H) 2.0 - 4.0 g/dL A-G Ratio 1.0 0.8 - 1.7 CARDIAC PANEL,(CK, CKMB & TROPONIN) Collection Time: 01/18/19 12:15 PM  
Result Value Ref Range CK 85 26 - 192 U/L  
 CK - MB <1.0 <3.6 ng/ml CK-MB Index  0.0 - 4.0 % CALCULATION NOT PERFORMED WHEN RESULT IS BELOW LINEAR LIMIT Troponin-I, QT <0.02 0.00 - 0.06 NG/ML Radiologic Studies -  
XR CHEST PORT Final Result IMPRESSION:  
  
Negative chest.  
  
  
 
 
 
Medical Decision Making Provider Notes (Medical Decision Making): MDM Number of Diagnoses or Management Options Chest pain, unspecified type: I am the first provider for this patient. I reviewed the vital signs, available nursing notes, past medical history, past surgical history, family history and social history. Vital Signs-Reviewed the patient's vital signs. Records Reviewed: Nursing Notes and Old Medical Records (Time of Review: 11:45 AM) ED Course: Progress Notes, Reevaluation, and Consults: 
Labs essentially normal. Cardiac enzymes negative. Chest X-Ray showed No acute process. EKG showed NSR with rate of 72 bpm. With no ST elevations or depression and non specific T wave changes. 12:56 PM 1/18/2019 Diagnosis I have reassessed the patient. Patient is feeling better. Patient was discharged in stable condition. Patient is to return to emergency department if any new or worsening condition. Clinical Impression: 1. Chest pain, unspecified type Disposition: Discharge- Home Follow-up Information Follow up With Specialties Details Why Contact Info JEANNE Bhandari Physician Assistant Schedule an appointment as soon as possible for a visit in 3 days For recheck of ongoing symptoms [address] [place] [address] 
[phone] 
  
  
  
 
_______________________________ Attestations: 
Scribe Attestation Delia Fermin acting as a scribe for and in the presence of [facility] Center[facility]ber[facility] DO     
January 18, 2019 at 11:45 AM 
    
Provider Attestation:     
I personally performed the services described in the documentation, reviewed the documentation, as recorded by the scribe in my presence, and it accurately and completely records my words and actions. January 18, 2019 at 11:45 AM - Rosemarie Healy DO   
_______________________________

## 2019-01-18 NOTE — DISCHARGE INSTRUCTIONS
Patient Education   Take your prescribed medication as directed. Follow up with your primary care physician. Return to the emergency room with any new or worsening conditions. Chest Pain: Care Instructions  Your Care Instructions    There are many things that can cause chest pain. Some are not serious and will get better on their own in a few days. But some kinds of chest pain need more testing and treatment. Your doctor may have recommended a follow-up visit in the next 8 to 12 hours. If you are not getting better, you may need more tests or treatment. Even though your doctor has released you, you still need to watch for any problems. The doctor carefully checked you, but sometimes problems can develop later. If you have new symptoms or if your symptoms do not get better, get medical care right away. If you have worse or different chest pain or pressure that lasts more than 5 minutes or you passed out (lost consciousness), call 911 or seek other emergency help right away. A medical visit is only one step in your treatment. Even if you feel better, you still need to do what your doctor recommends, such as going to all suggested follow-up appointments and taking medicines exactly as directed. This will help you recover and help prevent future problems. How can you care for yourself at home? · Rest until you feel better. · Take your medicine exactly as prescribed. Call your doctor if you think you are having a problem with your medicine. · Do not drive after taking a prescription pain medicine. When should you call for help? Call 911 if:    · You passed out (lost consciousness).     · You have severe difficulty breathing.     · You have symptoms of a heart attack. These may include:  ? Chest pain or pressure, or a strange feeling in your chest.  ? Sweating. ? Shortness of breath. ? Nausea or vomiting.   ? Pain, pressure, or a strange feeling in your back, neck, jaw, or upper belly or in one or both shoulders or arms. ? Lightheadedness or sudden weakness. ? A fast or irregular heartbeat. After you call 911, the  may tell you to chew 1 adult-strength or 2 to 4 low-dose aspirin. Wait for an ambulance. Do not try to drive yourself.    Call your doctor today if:    · You have any trouble breathing.     · Your chest pain gets worse.     · You are dizzy or lightheaded, or you feel like you may faint.     · You are not getting better as expected.     · You are having new or different chest pain. Where can you learn more? Go to http://hugo-pee.info/. Enter A120 in the search box to learn more about \"Chest Pain: Care Instructions. \"  Current as of: September 23, 2018  Content Version: 11.9  © 2397-7642 [employer]. Care instructions adapted under license by [employer] (which disclaims liability or warranty for this information). If you have questions about a medical condition or this instruction, always ask your healthcare professional. [place] [address] any warranty or liability for your use of this information.

## 2019-01-18 NOTE — PROGRESS NOTES
Patient walked into the office today, reporting that she has been experiencing exertional chest pain and SOB while in her physical therapy sessions, and walking up the stairs today. Unfortunately we have no appointments available today, and I explained to her that this type of complaint is best evaluated in an emergency department setting as opposed to the outpatient clinid. I advised Ms. Edwar Alejo that it would be best to go downstairs to the ER to r/o cardiac causes of her symptoms, and that I will be happy to schedule close follow up with her next week. Patient expressed understanding and agreed to check in to the ED today.

## 2019-01-18 NOTE — ED TRIAGE NOTES
Pt reports single episode 2 days ago of right sharp right sided chest pain that lasted about 30 seconds. Pt denies reoccurring of episode. Pt denies n/v/d, denies arm pain, arm weakness numbness, denies shortness of breath at rest, reports shortness of breath and lightheadedness with exertion.

## 2019-01-21 ENCOUNTER — TELEPHONE (OUTPATIENT)
Dept: FAMILY MEDICINE CLINIC | Age: 69
End: 2019-01-21

## 2019-01-21 NOTE — TELEPHONE ENCOUNTER
Called home number. Verified name and . Spoke with patient. Notified of message below per provider. Per patient she is fine and declined follow-up appointment. Patient notified to contact office if she need us. Patient stated will keep March appointment. Patient had no further questions or concerns. JEANNE Manrique sent to Micah Gonzales LPN             Patient was seen in the ER on Friday for chest pain. Please see if she can schedule a follow up at her earliest convenience? thanks!

## 2019-01-27 DIAGNOSIS — I10 ESSENTIAL HYPERTENSION WITH GOAL BLOOD PRESSURE LESS THAN 140/90: ICD-10-CM

## 2019-01-28 RX ORDER — AMLODIPINE BESYLATE 5 MG/1
TABLET ORAL
Qty: 90 TAB | Refills: 0 | Status: SHIPPED | OUTPATIENT
Start: 2019-01-28 | End: 2019-02-01 | Stop reason: SDUPTHER

## 2019-01-28 RX ORDER — AMLODIPINE BESYLATE 5 MG/1
TABLET ORAL
Qty: 90 TAB | Refills: 0 | OUTPATIENT
Start: 2019-01-28

## 2019-01-29 NOTE — TELEPHONE ENCOUNTER
Pt states that she is still taking the Amlodipine 5 MG and is was just reduced from 10 mg to 5 mg in June.  Please advise

## 2019-02-01 DIAGNOSIS — I10 ESSENTIAL HYPERTENSION WITH GOAL BLOOD PRESSURE LESS THAN 140/90: ICD-10-CM

## 2019-02-01 RX ORDER — AMLODIPINE BESYLATE 5 MG/1
TABLET ORAL
Qty: 90 TAB | Refills: 0 | Status: SHIPPED | OUTPATIENT
Start: 2019-02-01 | End: 2019-03-19 | Stop reason: SDUPTHER

## 2019-02-01 NOTE — TELEPHONE ENCOUNTER
This pharmacy [facility]) contacted office for the following prescriptions to be filled:    Medication requested :   Requested Prescriptions     Pending Prescriptions Disp Refills    amLODIPine (NORVASC) 5 mg tablet 90 Tab 0     PCP: Carrington Aldrich or Print: [facility]  Mail order or Local pharmacy: [phone]    Scheduled appointment if not seen by current providers in office: LOV 9/5/18, next appt 3/12/19      Ivy Gomes from [facility] states they did not receive the script sent on 1/28/19

## 2019-03-05 ENCOUNTER — OFFICE VISIT (OUTPATIENT)
Dept: NEUROLOGY | Age: 69
End: 2019-03-05

## 2019-03-05 VITALS
HEIGHT: 68 IN | WEIGHT: 151.6 LBS | BODY MASS INDEX: 22.97 KG/M2 | RESPIRATION RATE: 20 BRPM | SYSTOLIC BLOOD PRESSURE: 182 MMHG | DIASTOLIC BLOOD PRESSURE: 92 MMHG | HEART RATE: 72 BPM | TEMPERATURE: 98.7 F | OXYGEN SATURATION: 99 %

## 2019-03-05 DIAGNOSIS — G20 PARKINSON DISEASE (HCC): ICD-10-CM

## 2019-03-05 RX ORDER — CARBIDOPA AND LEVODOPA 25; 100 MG/1; MG/1
TABLET ORAL
Qty: 270 TAB | Refills: 6 | Status: SHIPPED | OUTPATIENT
Start: 2019-03-05 | End: 2020-01-09 | Stop reason: SDUPTHER

## 2019-03-05 RX ORDER — CARBIDOPA AND LEVODOPA 25; 100 MG/1; MG/1
1 TABLET ORAL 3 TIMES DAILY
Qty: 90 TAB | Refills: 6 | Status: SHIPPED | OUTPATIENT
Start: 2019-03-05 | End: 2019-03-05 | Stop reason: SDUPTHER

## 2019-03-05 NOTE — PROGRESS NOTES
Xochilt Perkins is a 79 y.o. female in today for follow-up on Parkinson's. Learning assessment previously completed; primary language is [place]. 1. Have you been to the ER, urgent care clinic since your last visit? Hospitalized since your last visit? Yes Reason for visit: [facility] ED, 1/18/2019, Chest pain    2. Have you seen or consulted any other health care providers outside of the [address] [place] [address] since your last visit? Include any pap smears or colon screening.  No

## 2019-03-05 NOTE — PROGRESS NOTES
Re:  Aurora Healy up visit     3/5/2019 10:11 AM    SSN: [id number]    Subjective:   Kelly Melara returns for follow up of her Parkinson's disease. She noticed a significant improvement in her standing posture and ambulation with the Sinemet. She's getting physical therapy for her knees, which has also helped with balance. Some sleepiness with the Sinemet. Medications:    Current Outpatient Medications   Medication Sig Dispense Refill    amLODIPine (NORVASC) 5 mg tablet TAKE 1 TABLET BY MOUTH DAILY 90 Tab 0    valsartan-hydroCHLOROthiazide (DIOVAN-HCT) 160-25 mg per tablet TAKE 1 TABLET BY MOUTH DAILY 90 Tab 0    carbidopa-levodopa (SINEMET)  mg per tablet Take 1 Tab by mouth three (3) times daily. 90 Tab 6    rOPINIRole (REQUIP) 0.25 mg tablet TAKE 1 TABLET BY MOUTH THREE TIMES DAILY 270 Tab 6    rosuvastatin (CRESTOR) 20 mg tablet Take 1 Tab by mouth nightly. 90 Tab 0    calcium carbonate/vitamin D3 (CALCIUM + D PO) Take  by mouth.  cholecalciferol, VITAMIN D3, (VITAMIN D3) 5,000 unit tab tablet Take  by mouth daily.  cyanocobalamin, vitamin B-12, (VITAMIN B-12 PO) Take  by mouth.          Vital signs:    Visit Vitals  BP (!) 200/100 (BP 1 Location: Left arm, BP Patient Position: Sitting)   Pulse 72   Temp 98.7 °F (37.1 °C) (Oral)   Resp 20   Ht 5' 8.25\" (1.734 m)   Wt 68.8 kg (151 lb 9.6 oz)   SpO2 99%   BMI 22.88 kg/m²       Review of Systems:   As above otherwise 11 point review of systems negative including;   Constitutional no fever or chills  Skin denies rash or itching  HEENT  Denies tinnitus, hearing lose  Eyes denies diplopia vision lose  Respiratory denies sortness of breath  Cardiovascular denies chest pain, dyspnea on exertion  Gastrointestinal denies nausea, vomiting, diarrhea, constipation  Genitourinary denies incontinence  Musculoskeletal denies joint pain or swelling  Endocrine denies weight change  Hematology denies easy bruising or bleeding Neurological as above in HPI      Patient Active Problem List   Diagnosis Code    Essential hypertension with goal blood pressure less than 140/90 I10    Osteopenia M85.80    Dyslipidemia E78.5    Prediabetes R73.03    Frequent urination at night R35.1    Leg heaviness R29.898    Parkinson disease ([address] G20         Objective: The patient is awake, alert, and oriented x 4. Fund of knowledge is adequate. Speech is fluent and memory is intact. Cranial Nerves: II - Visual fields are full to confrontation. III, IV, VI - Extraocular movements are intact. There is no nystagmus. V - Facial sensation is intact to pinprick. VII - Face is symmetrical.  VIII - Hearing is present. IX, X, XII - Palate is symmetrical.   XI - Shoulder shrugging and head turning intact  Motor: The patient moves all four limbs fairly well and symmetrically. Tone is normal. Reflexes are 2+ and symmetrical. Plantars are down going. Gait is normal and she turns fairly well. CBC:   Lab Results   Component Value Date/Time    WBC 5.6 01/18/2019 12:15 PM    RBC 4.31 01/18/2019 12:15 PM    HGB 13.0 01/18/2019 12:15 PM    HCT 39.3 01/18/2019 12:15 PM    PLATELET 985 42/39/4492 12:15 PM     BMP:   Lab Results   Component Value Date/Time    Glucose 97 01/18/2019 12:15 PM    Sodium 139 01/18/2019 12:15 PM    Potassium 3.6 01/18/2019 12:15 PM    Chloride 102 01/18/2019 12:15 PM    CO2 28 01/18/2019 12:15 PM    BUN 13 01/18/2019 12:15 PM    Creatinine 0.76 01/18/2019 12:15 PM    Calcium 9.6 01/18/2019 12:15 PM     CMP:   Lab Results   Component Value Date/Time    Glucose 97 01/18/2019 12:15 PM    Sodium 139 01/18/2019 12:15 PM    Potassium 3.6 01/18/2019 12:15 PM    Chloride 102 01/18/2019 12:15 PM    CO2 28 01/18/2019 12:15 PM    BUN 13 01/18/2019 12:15 PM    Creatinine 0.76 01/18/2019 12:15 PM    Calcium 9.6 01/18/2019 12:15 PM    Anion gap 9 01/18/2019 12:15 PM    BUN/Creatinine ratio 17 01/18/2019 12:15 PM    Alk.  phosphatase 77 01/18/2019 12:15 PM    Protein, total 8.3 (H) 01/18/2019 12:15 PM    Albumin 4.2 01/18/2019 12:15 PM    Globulin 4.1 (H) 01/18/2019 12:15 PM    A-G Ratio 1.0 01/18/2019 12:15 PM     Coagulation: No results found for: PTP, INR, APTT, PTTT  Cardiac markers:   Lab Results   Component Value Date/Time    CK 85 01/18/2019 12:15 PM    CK-MB Index  01/18/2019 12:15 PM     CALCULATION NOT PERFORMED WHEN RESULT IS BELOW LINEAR LIMIT       Assessment:  Early parkinson's disease, doing well with low dose of current medications. Plan:  Continue the same and see her back here in about 6 months. Sincerely,        Santos Basilio.  Beatris Feliz M.D.

## 2019-03-06 DIAGNOSIS — I10 ESSENTIAL HYPERTENSION WITH GOAL BLOOD PRESSURE LESS THAN 140/90: Chronic | ICD-10-CM

## 2019-03-06 RX ORDER — VALSARTAN AND HYDROCHLOROTHIAZIDE 160; 25 MG/1; MG/1
TABLET ORAL
Qty: 90 TAB | Refills: 0 | Status: SHIPPED | OUTPATIENT
Start: 2019-03-06 | End: 2019-06-07 | Stop reason: SDUPTHER

## 2019-03-19 ENCOUNTER — OFFICE VISIT (OUTPATIENT)
Dept: FAMILY MEDICINE CLINIC | Age: 69
End: 2019-03-19

## 2019-03-19 VITALS
BODY MASS INDEX: 23.04 KG/M2 | HEIGHT: 68 IN | WEIGHT: 152 LBS | TEMPERATURE: 98.7 F | DIASTOLIC BLOOD PRESSURE: 80 MMHG | RESPIRATION RATE: 16 BRPM | OXYGEN SATURATION: 99 % | SYSTOLIC BLOOD PRESSURE: 132 MMHG | HEART RATE: 72 BPM

## 2019-03-19 DIAGNOSIS — G20 PARKINSON DISEASE (HCC): ICD-10-CM

## 2019-03-19 DIAGNOSIS — R73.03 PREDIABETES: ICD-10-CM

## 2019-03-19 DIAGNOSIS — I10 ESSENTIAL HYPERTENSION WITH GOAL BLOOD PRESSURE LESS THAN 140/90: Primary | ICD-10-CM

## 2019-03-19 DIAGNOSIS — E78.5 DYSLIPIDEMIA: ICD-10-CM

## 2019-03-19 DIAGNOSIS — R29.898 RIGHT HAND WEAKNESS: ICD-10-CM

## 2019-03-19 RX ORDER — AMLODIPINE BESYLATE 5 MG/1
TABLET ORAL
Qty: 90 TAB | Refills: 1 | Status: SHIPPED | OUTPATIENT
Start: 2019-03-19 | End: 2019-06-19 | Stop reason: DRUGHIGH

## 2019-03-19 NOTE — PROGRESS NOTES
Patient: Maddison Duggan MRN: [id number]  SSN: [id number]    YOB: 1950  Age: 79 y.o. Sex: female      Date of Service: 3/19/2019   Provider: JEANNE Marr   Office Location:   [address] [place] [address] Dr Kishan azul, [address] [facility].  Office Phone: [phone]  Office Fax: [phone]      REASON FOR VISIT:   Chief Complaint   Patient presents with    Cholesterol Problem    Hypertension        VITALS:   Visit Vitals  /80 (BP 1 Location: Left arm, BP Patient Position: Sitting)   Pulse 72   Temp 98.7 °F (37.1 °C) (Oral)   Resp 16   Ht 5' 8.25\" (1.734 m)   Wt 152 lb (68.9 kg)   SpO2 99%   BMI 22.94 kg/m²        MEDICATIONS:   Current Outpatient Medications on File Prior to Visit   Medication Sig Dispense Refill    valsartan-hydroCHLOROthiazide (DIOVAN-HCT) 160-25 mg per tablet TAKE 1 TABLET BY MOUTH DAILY 90 Tab 0    carbidopa-levodopa (SINEMET)  mg per tablet TAKE 1 TABLET BY MOUTH THREE TIMES DAILY 270 Tab 6    amLODIPine (NORVASC) 5 mg tablet TAKE 1 TABLET BY MOUTH DAILY 90 Tab 0    rOPINIRole (REQUIP) 0.25 mg tablet TAKE 1 TABLET BY MOUTH THREE TIMES DAILY 270 Tab 6    rosuvastatin (CRESTOR) 20 mg tablet Take 1 Tab by mouth nightly. 90 Tab 0    calcium carbonate/vitamin D3 (CALCIUM + D PO) Take  by mouth.  cholecalciferol, VITAMIN D3, (VITAMIN D3) 5,000 unit tab tablet Take  by mouth daily.  cyanocobalamin, vitamin B-12, (VITAMIN B-12 PO) Take  by mouth. No current facility-administered medications on file prior to visit.          ALLERGIES:   No Known Allergies     MEDICAL/SURGICAL HISTORY:  Past Medical History:   Diagnosis Date    Bladder perforation, intraoperative     during hysterectomy    Hyperlipidemia     Hypertension     Osteopenia     Parkinson disease ([address]     Prediabetes       Past Surgical History:   Procedure Laterality Date    HX  SECTION      HX JEROD AND BSO      NV EXTRAC ERUPTED TOOTH/EXPOSED ROOT          FAMILY HISTORY:  Family History   Problem Relation Age of Onset    Hypertension Mother     Hypertension Father     Hypertension Brother     Hypertension Brother     Kidney Disease Brother         SOCIAL HISTORY:  Social History     Tobacco Use    Smoking status: Never Smoker    Smokeless tobacco: Never Used   Substance Use Topics    Alcohol use: No    Drug use: No             HISTORY OF PRESENT ILLNESS: Manuel Hay is a 79 y.o. female who presents to the office for a routine follow up visit. Hypertension -   Patient takes Diovan -25 mg daily and amlodipine 5 mg daily. She does monitor her BP at home and for the most part it has been high 130s-low 140s/80s   She was seen in the ED in January with complaints of chest pain that was ultimately thought to be musculoskeletal following a negative cardiac workup. Patient states she has had no recurrence of chest discomfort. Pre-DM -   Patient was noted to have an elevated A1c at 6.0% in May 2018. She has been working on following a healthy diet and trying to get regular exercise.      Hyperlipidemia -  Last lipid panel done 18 showed total cholesterol 275, , HDL 57, and trig 76  Her statin was subsequently changed from Lipitor to Crestor, and she was due to repeat labs in December, but it appears these were never completed and the order .      Parkinson Disease -   Diagnosed last year, doing well on Sinemet. Patient is followed by neurology (Dr. Tomeka Chapa) and has also been working with physical therapy for balance training. Today, she complains of a deformity to her R hand. She states it \"looks like a claw. \" It seems that she is having some weakness of her extensor muscles, predominantly affecting her 3rd-5th digit. This has been going on for about 2 weeks and she cannot recall any inciting injury or trigger for her symptoms. She denies any pain, numbness, tingling, or proximal weakness.  She wonders if it might be a side effect of her Sinemet    REVIEW OF SYSTEMS:  Review of Systems   Constitutional: Negative for chills, fever and malaise/fatigue. Respiratory: Negative for cough, shortness of breath and wheezing. Cardiovascular: Negative for chest pain, palpitations and leg swelling. Gastrointestinal: Negative for abdominal pain, constipation, diarrhea, nausea and vomiting. PHYSICAL EXAMINATION:  Physical Exam   Constitutional: She is oriented to person, place, and time and well-developed, well-nourished, and in no distress. Cardiovascular: Normal rate, regular rhythm and normal heart sounds. Exam reveals no gallop and no friction rub. No murmur heard. Pulmonary/Chest: Effort normal and breath sounds normal. She has no wheezes. She has no rales. Neurological: She is alert and oriented to person, place, and time. No cranial nerve deficit. R hand with slight deformity - patient unable to fully extend fingers.  strength 5/5 on the left, 4/5 on the right. Muscle strength and tone otherwise normal and symmetric in b/l upper extremities. Skin: Skin is warm and dry. No rash noted. Psychiatric: Mood, memory and affect normal.        RESULTS:  No results found for this visit on 03/19/19. ASSESSMENT/PLAN:  Diagnoses and all orders for this visit:    1. Essential hypertension with goal blood pressure less than 140/90  - Well controlled  - Continue current regimen  - Refills given  Orders:    -     amLODIPine (NORVASC) 5 mg tablet; TAKE 1 TABLET BY MOUTH DAILY    2. Prediabetes  - Check fasting labs ASAP  Orders:    -     METABOLIC PANEL, COMPREHENSIVE; Future  -     LIPID PANEL; Future  -     HEMOGLOBIN A1C W/O EAG; Future    3. Dyslipidemia  - Tolerating switch to Crestor  - Re-check fasting labs  Orders:    -     METABOLIC PANEL, COMPREHENSIVE; Future  -     LIPID PANEL; Future    4. Parkinson disease [address]  - Doing well on Sinemet  - f/u with neurology as scheduled     5.  Right hand weakness  - Discussed possible etiologies with patient. I explained that I am not familiar enough with PD or Sinemet side effects to be able to say for certain that this is a \"typical\" symptom or side effect  - Other considerations include arthritis of the hand, compressive neuropathies, or possible even small CVA (given her risk factors)  - We discussed workup in the form of MRI, EMG, etc. vs. referral back to neurology. Patient states she will start by calling Dr. Su Jones, which I agree is reasonable. Encouraged her to follow up on this asap       Follow-up Disposition:  Return in about 3 months (around 6/19/2019) for annual medicare wellness/routine care and please have labs done ASAP. All questions answered. Patient expresses understanding and agrees with the plan as detailed above.     PATIENT CARE TEAM:   Patient Care Team:  JEANNE Oneill as PCP - General (Physician Assistant)       JEANNE England   March 19, 2019   2:57 PM

## 2019-03-19 NOTE — PATIENT INSTRUCTIONS
A Healthy Lifestyle: Care Instructions  Your Care Instructions    A healthy lifestyle can help you feel good, stay at a healthy weight, and have plenty of energy for both work and play. A healthy lifestyle is something you can share with your whole family. A healthy lifestyle also can lower your risk for serious health problems, such as high blood pressure, heart disease, and diabetes. You can follow a few steps listed below to improve your health and the health of your family. Follow-up care is a key part of your treatment and safety. Be sure to make and go to all appointments, and call your doctor if you are having problems. It's also a good idea to know your test results and keep a list of the medicines you take. How can you care for yourself at home? · Do not eat too much sugar, fat, or fast foods. You can still have dessert and treats now and then. The goal is moderation. · Start small to improve your eating habits. Pay attention to portion sizes, drink less juice and soda pop, and eat more fruits and vegetables. ? Eat a healthy amount of food. A 3-ounce serving of meat, for example, is about the size of a deck of cards. Fill the rest of your plate with vegetables and whole grains. ? Limit the amount of soda and sports drinks you have every day. Drink more water when you are thirsty. ? Eat at least 5 servings of fruits and vegetables every day. It may seem like a lot, but it is not hard to reach this goal. A serving or helping is 1 piece of fruit, 1 cup of vegetables, or 2 cups of leafy, raw vegetables. Have an apple or some carrot sticks as an afternoon snack instead of a candy bar. Try to have fruits and/or vegetables at every meal.  · Make exercise part of your daily routine. You may want to start with simple activities, such as walking, bicycling, or slow swimming. Try to be active 30 to 60 minutes every day. You do not need to do all 30 to 60 minutes all at once.  For example, you can exercise 3 times a day for 10 or 20 minutes. Moderate exercise is safe for most people, but it is always a good idea to talk to your doctor before starting an exercise program.  · Keep moving. [employer]iki e[employer] the lawn, work in the garden, or [employer]. Take the stairs instead of the elevator at work. · If you smoke, quit. People who smoke have an increased risk for heart attack, stroke, cancer, and other lung illnesses. Quitting is hard, but there are ways to boost your chance of quitting tobacco for good. ? Use nicotine gum, patches, or lozenges. ? Ask your doctor about stop-smoking programs and medicines. ? Keep trying. In addition to reducing your risk of diseases in the future, you will notice some benefits soon after you stop using tobacco. If you have shortness of breath or asthma symptoms, they will likely get better within a few weeks after you quit. · Limit how much alcohol you drink. Moderate amounts of alcohol (up to 2 drinks a day for men, 1 drink a day for women) are okay. But drinking too much can lead to liver problems, high blood pressure, and other health problems. Family health  If you have a family, there are many things you can do together to improve your health. · Eat meals together as a family as often as possible. · Eat healthy foods. This includes fruits, vegetables, lean meats and dairy, and whole grains. · Include your family in your fitness plan. Most people think of activities such as jogging or tennis as the way to fitness, but there are many ways you and your family can be more active. Anything that makes you breathe hard and gets your heart pumping is exercise. Here are some tips:  ? Walk to do errands or to take your child to school or the bus.  ? Go for a family bike ride after dinner instead of watching TV. Where can you learn more? Go to http://hugo-pee.info/. Enter B385 in the search box to learn more about \"A Healthy Lifestyle: Care Instructions. \"  Current as of: September 11, 2018  Content Version: 11.9  © 7731-4657 [employer]. Care instructions adapted under license by [employer] (which disclaims liability or warranty for this information). If you have questions about a medical condition or this instruction, always ask your healthcare professional. [address] any warranty or liability for your use of this information.     Follow up in 3 months for annual medicare wellness/routine care and please have labs done ASAP

## 2019-03-19 NOTE — PROGRESS NOTES
1. Have you been to the ER, urgent care clinic since your last visit? Hospitalized since your last visit? No    2. Have you seen or consulted any other health care providers outside of the [address] [place] [address] since your last visit? Include any pap smears or colon screening. No     Abuse Screening Questionnaire 3/19/2019   Do you ever feel afraid of your partner? N   Are you in a relationship with someone who physically or mentally threatens you? N   Is it safe for you to go home? Y     Fall Risk Assessment, last 12 mths 3/19/2019   Able to walk? Yes   Fall in past 12 months?  No             3 most recent PHQ Screens 3/19/2019   Little interest or pleasure in doing things Not at all   Feeling down, depressed, irritable, or hopeless Not at all   Total Score PHQ 2 0

## 2019-03-20 ENCOUNTER — HOSPITAL ENCOUNTER (OUTPATIENT)
Dept: LAB | Age: 69
Discharge: HOME | End: 2019-03-20
Payer: MEDICARE

## 2019-03-20 DIAGNOSIS — R73.03 PREDIABETES: ICD-10-CM

## 2019-03-20 DIAGNOSIS — E78.5 DYSLIPIDEMIA: ICD-10-CM

## 2019-03-20 LAB
ALBUMIN SERPL-MCNC: 4 G/DL (ref 3.4–5)
ALBUMIN/GLOB SERPL: 1.1 {RATIO} (ref 0.8–1.7)
ALP SERPL-CCNC: 108 U/L (ref 45–117)
ALT SERPL-CCNC: 22 U/L (ref 13–56)
ANION GAP SERPL CALC-SCNC: 8 MMOL/L (ref 3–18)
AST SERPL-CCNC: 12 U/L (ref 15–37)
BILIRUB SERPL-MCNC: 0.4 MG/DL (ref 0.2–1)
BUN SERPL-MCNC: 14 MG/DL (ref 7–18)
BUN/CREAT SERPL: 18 (ref 12–20)
CALCIUM SERPL-MCNC: 9.6 MG/DL (ref 8.5–10.1)
CHLORIDE SERPL-SCNC: 107 MMOL/L (ref 100–108)
CHOLEST SERPL-MCNC: 258 MG/DL
CO2 SERPL-SCNC: 27 MMOL/L (ref 21–32)
CREAT SERPL-MCNC: 0.79 MG/DL (ref 0.6–1.3)
GLOBULIN SER CALC-MCNC: 3.6 G/DL (ref 2–4)
GLUCOSE SERPL-MCNC: 95 MG/DL (ref 74–99)
HBA1C MFR BLD: 5.9 % (ref 4.2–5.6)
HDLC SERPL-MCNC: 63 MG/DL (ref 40–60)
HDLC SERPL: 4.1 {RATIO} (ref 0–5)
LDLC SERPL CALC-MCNC: 180.2 MG/DL (ref 0–100)
LIPID PROFILE,FLP: ABNORMAL
POTASSIUM SERPL-SCNC: 4.4 MMOL/L (ref 3.5–5.5)
PROT SERPL-MCNC: 7.6 G/DL (ref 6.4–8.2)
SODIUM SERPL-SCNC: 142 MMOL/L (ref 136–145)
TRIGL SERPL-MCNC: 74 MG/DL (ref ?–150)
VLDLC SERPL CALC-MCNC: 14.8 MG/DL

## 2019-03-20 PROCEDURE — 80053 COMPREHEN METABOLIC PANEL: CPT

## 2019-03-20 PROCEDURE — 83036 HEMOGLOBIN GLYCOSYLATED A1C: CPT

## 2019-03-20 PROCEDURE — 36415 COLL VENOUS BLD VENIPUNCTURE: CPT

## 2019-03-20 PROCEDURE — 80061 LIPID PANEL: CPT

## 2019-03-26 ENCOUNTER — TELEPHONE (OUTPATIENT)
Dept: FAMILY MEDICINE CLINIC | Age: 69
End: 2019-03-26

## 2019-03-26 NOTE — TELEPHONE ENCOUNTER
Patient identifiers verified. Patient advised that     Blood sugar - normal  Electrolytes - normal   Kidney function - normal  Liver function - normal    Lipid Panel  LDL (bad cholesterol) - elevated, but improved. Please continue your Crestor. We will continue to monitor this closely. HDL (good cholesterol) - high (which is good!)   Triglycerides (fatty acids) - normal     HbA1c%  3 month average blood sugar - mildly elevated, in the prediabetic range, but improved.  Please continue to work on diet & exercise to reduce your risk of diabetes

## 2019-04-09 ENCOUNTER — DOCUMENTATION ONLY (OUTPATIENT)
Dept: NEUROLOGY | Age: 69
End: 2019-04-09

## 2019-05-07 ENCOUNTER — OFFICE VISIT (OUTPATIENT)
Dept: NEUROLOGY | Age: 69
End: 2019-05-07

## 2019-05-07 VITALS
WEIGHT: 144 LBS | HEIGHT: 68 IN | RESPIRATION RATE: 20 BRPM | OXYGEN SATURATION: 99 % | TEMPERATURE: 97.5 F | SYSTOLIC BLOOD PRESSURE: 158 MMHG | HEART RATE: 74 BPM | DIASTOLIC BLOOD PRESSURE: 90 MMHG | BODY MASS INDEX: 21.82 KG/M2

## 2019-05-07 DIAGNOSIS — G20 PARKINSON DISEASE (HCC): Primary | ICD-10-CM

## 2019-05-07 NOTE — PROGRESS NOTES
Carlos A Liu is a 79 y.o. female in today for follow-up on Parkinson's. Patient has concerns of stiff right hand. Learning assessment previously completed 6/12/2018; primary language is [place]. 1. Have you been to the ER, urgent care clinic since your last visit? Hospitalized since your last visit? No    2. Have you seen or consulted any other health care providers outside of the [address] [place] [address] since your last visit? Include any pap smears or colon screening.  No

## 2019-05-07 NOTE — PROGRESS NOTES
Re:  Gilford Rasher up visit     5/7/2019 10:11 AM    SSN: [id number]    Subjective:   Judy Craft returns for follow up of her Parkinson's disease. She noticed a significant improvement in her standing posture and ambulation with the Sinemet. She's getting physical therapy for her knees, which has also helped with balance. Mild tremor not impacting on her lifestyle. She's here early because of some tightness in the right forearm, causing inability to straighten the hand completely. Medications:    Current Outpatient Medications   Medication Sig Dispense Refill    amLODIPine (NORVASC) 5 mg tablet TAKE 1 TABLET BY MOUTH DAILY 90 Tab 1    valsartan-hydroCHLOROthiazide (DIOVAN-HCT) 160-25 mg per tablet TAKE 1 TABLET BY MOUTH DAILY 90 Tab 0    carbidopa-levodopa (SINEMET)  mg per tablet TAKE 1 TABLET BY MOUTH THREE TIMES DAILY 270 Tab 6    rOPINIRole (REQUIP) 0.25 mg tablet TAKE 1 TABLET BY MOUTH THREE TIMES DAILY 270 Tab 6    rosuvastatin (CRESTOR) 20 mg tablet Take 1 Tab by mouth nightly. 90 Tab 0    calcium carbonate/vitamin D3 (CALCIUM + D PO) Take  by mouth.  cholecalciferol, VITAMIN D3, (VITAMIN D3) 5,000 unit tab tablet Take  by mouth daily.  cyanocobalamin, vitamin B-12, (VITAMIN B-12 PO) Take  by mouth.          Vital signs:    Visit Vitals  /90 (BP 1 Location: Left arm, BP Patient Position: Sitting)   Pulse 74   Temp 97.5 °F (36.4 °C) (Oral)   Resp 20   Ht 5' 8.25\" (1.734 m)   Wt 65.3 kg (144 lb)   SpO2 99%   BMI 21.74 kg/m²       Review of Systems:   As above otherwise 11 point review of systems negative including;   Constitutional no fever or chills  Skin denies rash or itching  HEENT  Denies tinnitus, hearing lose  Eyes denies diplopia vision lose  Respiratory denies sortness of breath  Cardiovascular denies chest pain, dyspnea on exertion  Gastrointestinal denies nausea, vomiting, diarrhea, constipation  Genitourinary denies incontinence  Musculoskeletal denies joint pain or swelling  Endocrine denies weight change  Hematology denies easy bruising or bleeding   Neurological as above in HPI      Patient Active Problem List   Diagnosis Code    Essential hypertension with goal blood pressure less than 140/90 I10    Osteopenia M85.80    Dyslipidemia E78.5    Prediabetes R73.03    Frequent urination at night R35.1    Leg heaviness R29.898    Parkinson disease ([address] G20         Objective: The patient is awake, alert, and oriented x 4. Fund of knowledge is adequate. Speech is fluent and memory is intact. Cranial Nerves: II - Visual fields are full to confrontation. III, IV, VI - Extraocular movements are intact. There is no nystagmus. V - Facial sensation is intact to pinprick. VII - Face is symmetrical.  VIII - Hearing is present. IX, X, XII - Palate is symmetrical.   XI - Shoulder shrugging and head turning intact  Motor: The patient moves all four limbs fairly well and symmetrically. Tone is normal. Reflexes are 2+ and symmetrical. Plantars are down going. Gait is normal and she turns fairly well. She has some difficulty straightening the fingers on the right hand from some contrations of the forearm muscles.        CBC:   Lab Results   Component Value Date/Time    WBC 5.6 01/18/2019 12:15 PM    RBC 4.31 01/18/2019 12:15 PM    HGB 13.0 01/18/2019 12:15 PM    HCT 39.3 01/18/2019 12:15 PM    PLATELET 034 36/56/2226 12:15 PM     BMP:   Lab Results   Component Value Date/Time    Glucose 95 03/20/2019 08:05 AM    Sodium 142 03/20/2019 08:05 AM    Potassium 4.4 03/20/2019 08:05 AM    Chloride 107 03/20/2019 08:05 AM    CO2 27 03/20/2019 08:05 AM    BUN 14 03/20/2019 08:05 AM    Creatinine 0.79 03/20/2019 08:05 AM    Calcium 9.6 03/20/2019 08:05 AM     CMP:   Lab Results   Component Value Date/Time    Glucose 95 03/20/2019 08:05 AM    Sodium 142 03/20/2019 08:05 AM    Potassium 4.4 03/20/2019 08:05 AM    Chloride 107 03/20/2019 08:05 AM    CO2 27 03/20/2019 08:05 AM    BUN 14 03/20/2019 08:05 AM    Creatinine 0.79 03/20/2019 08:05 AM    Calcium 9.6 03/20/2019 08:05 AM    Anion gap 8 03/20/2019 08:05 AM    BUN/Creatinine ratio 18 03/20/2019 08:05 AM    Alk. phosphatase 108 03/20/2019 08:05 AM    Protein, total 7.6 03/20/2019 08:05 AM    Albumin 4.0 03/20/2019 08:05 AM    Globulin 3.6 03/20/2019 08:05 AM    A-G Ratio 1.1 03/20/2019 08:05 AM     Coagulation: No results found for: PTP, INR, APTT, PTTT  Cardiac markers:   Lab Results   Component Value Date/Time    CK 85 01/18/2019 12:15 PM    CK-MB Index  01/18/2019 12:15 PM     CALCULATION NOT PERFORMED WHEN RESULT IS BELOW LINEAR LIMIT       Assessment:  Early parkinson's disease, doing well with low dose of current medications. Mild right forearm spasticity, from her Parkinson's    Plan:  Continue the same and see her back here in about 6 months. Sincerely,        Angela Strauss.  Kita Lundy M.D.

## 2019-05-14 ENCOUNTER — HOSPITAL ENCOUNTER (OUTPATIENT)
Dept: MAMMOGRAPHY | Age: 69
Discharge: HOME | End: 2019-05-14
Attending: PHYSICIAN ASSISTANT
Payer: MEDICARE

## 2019-05-14 DIAGNOSIS — Z12.31 VISIT FOR SCREENING MAMMOGRAM: ICD-10-CM

## 2019-05-14 PROCEDURE — 77063 BREAST TOMOSYNTHESIS BI: CPT

## 2019-05-16 ENCOUNTER — TELEPHONE (OUTPATIENT)
Dept: FAMILY MEDICINE CLINIC | Age: 69
End: 2019-05-16

## 2019-05-16 NOTE — TELEPHONE ENCOUNTER
Patient called  regarding recent mammogram  results. Please call patient back at your earliest convenience.

## 2019-05-16 NOTE — TELEPHONE ENCOUNTER
Patient identifiers verified. Patient advised that mammogram was normal. Will repeat in 1 year. She voices understanding.

## 2019-06-07 DIAGNOSIS — I10 ESSENTIAL HYPERTENSION WITH GOAL BLOOD PRESSURE LESS THAN 140/90: Chronic | ICD-10-CM

## 2019-06-09 RX ORDER — VALSARTAN AND HYDROCHLOROTHIAZIDE 160; 25 MG/1; MG/1
TABLET ORAL
Qty: 90 TAB | Refills: 0 | Status: SHIPPED | OUTPATIENT
Start: 2019-06-09 | End: 2019-09-07 | Stop reason: SDUPTHER

## 2019-06-09 NOTE — TELEPHONE ENCOUNTER
Refill approved but patient is due now for Medicare wellness + routine (30 min visit) please have her schedule at her convenience

## 2019-06-10 NOTE — TELEPHONE ENCOUNTER
Spoke with Mrs. Akin England, she has already  medication and been scheduled for June 19,2019 at 8:30 am

## 2019-06-19 ENCOUNTER — OFFICE VISIT (OUTPATIENT)
Dept: FAMILY MEDICINE CLINIC | Age: 69
End: 2019-06-19

## 2019-06-19 VITALS
OXYGEN SATURATION: 99 % | RESPIRATION RATE: 16 BRPM | SYSTOLIC BLOOD PRESSURE: 162 MMHG | HEIGHT: 68 IN | BODY MASS INDEX: 21.52 KG/M2 | HEART RATE: 68 BPM | DIASTOLIC BLOOD PRESSURE: 94 MMHG | TEMPERATURE: 97.9 F | WEIGHT: 142 LBS

## 2019-06-19 DIAGNOSIS — G20 PARKINSON DISEASE (HCC): ICD-10-CM

## 2019-06-19 DIAGNOSIS — R73.03 PREDIABETES: ICD-10-CM

## 2019-06-19 DIAGNOSIS — I10 ESSENTIAL HYPERTENSION WITH GOAL BLOOD PRESSURE LESS THAN 140/90: Primary | ICD-10-CM

## 2019-06-19 DIAGNOSIS — Z23 ENCOUNTER FOR IMMUNIZATION: ICD-10-CM

## 2019-06-19 DIAGNOSIS — Z00.00 INITIAL MEDICARE ANNUAL WELLNESS VISIT: ICD-10-CM

## 2019-06-19 DIAGNOSIS — Z12.11 SCREENING FOR COLON CANCER: ICD-10-CM

## 2019-06-19 DIAGNOSIS — E78.5 DYSLIPIDEMIA: ICD-10-CM

## 2019-06-19 RX ORDER — AMLODIPINE BESYLATE 10 MG/1
10 TABLET ORAL DAILY
Qty: 90 TAB | Refills: 0 | Status: SHIPPED | OUTPATIENT
Start: 2019-06-19 | End: 2019-09-11 | Stop reason: SDUPTHER

## 2019-06-19 RX ORDER — ROSUVASTATIN CALCIUM 20 MG/1
20 TABLET, COATED ORAL
Qty: 90 TAB | Refills: 0 | Status: SHIPPED | OUTPATIENT
Start: 2019-06-19 | End: 2021-03-10 | Stop reason: SDUPTHER

## 2019-06-19 RX ORDER — AMLODIPINE BESYLATE 5 MG/1
TABLET ORAL
Qty: 90 TAB | Refills: 1 | Status: CANCELLED | OUTPATIENT
Start: 2019-06-19

## 2019-06-19 NOTE — PROGRESS NOTES
This is an Initial Medicare Annual Wellness Exam (AWV) (Performed 12 months after IPPE or effective date of Medicare Part B enrollment, Once in a lifetime)    I have reviewed the patient's medical history in detail and updated the computerized patient record. History     Past Medical History:   Diagnosis Date    Bladder perforation, intraoperative     during hysterectomy    Hyperlipidemia     Hypertension 1990    Osteopenia     Parkinson disease ([facility] [address]     Prediabetes       Past Surgical History:   Procedure Laterality Date    HX  SECTION      HX JEROD AND BSO      [place] EXTRAC ERUPTED TOOTH/EXPOSED ROOT       Current Outpatient Medications   Medication Sig Dispense Refill    rosuvastatin (CRESTOR) 20 mg tablet Take 1 Tab by mouth nightly. 90 Tab 0    amLODIPine (NORVASC) 10 mg tablet Take 1 Tab by mouth daily. 90 Tab 0    valsartan-hydroCHLOROthiazide (DIOVAN-HCT) 160-25 mg per tablet TAKE 1 TABLET BY MOUTH DAILY 90 Tab 0    carbidopa-levodopa (SINEMET)  mg per tablet TAKE 1 TABLET BY MOUTH THREE TIMES DAILY 270 Tab 6    rOPINIRole (REQUIP) 0.25 mg tablet TAKE 1 TABLET BY MOUTH THREE TIMES DAILY 270 Tab 6    calcium carbonate/vitamin D3 (CALCIUM + D PO) Take  by mouth.  cholecalciferol, VITAMIN D3, (VITAMIN D3) 5,000 unit tab tablet Take  by mouth daily.  cyanocobalamin, vitamin B-12, (VITAMIN B-12 PO) Take  by mouth.        No Known Allergies  Family History   Problem Relation Age of Onset    Hypertension Mother     Hypertension Father     Hypertension Brother     Hypertension Brother     Kidney Disease Brother      Social History     Tobacco Use    Smoking status: Never Smoker    Smokeless tobacco: Never Used   Substance Use Topics    Alcohol use: No     Patient Active Problem List   Diagnosis Code    Essential hypertension with goal blood pressure less than 140/90 I10    Osteopenia M85.80    Dyslipidemia E78.5    Prediabetes R73.03    Frequent urination at night R35.1    Leg heaviness R29.898    Parkinson disease ([facility] [address] G20       Depression Risk Factor Screening:     3 most recent PHQ Screens 3/19/2019   Little interest or pleasure in doing things Not at all   Feeling down, depressed, irritable, or hopeless Not at all   Total Score PHQ 2 0     Alcohol Risk Factor Screening: You do not drink alcohol or very rarely. Functional Ability and Level of Safety:     Hearing Loss  Hearing is good. Activities of Daily Living  The home contains: no safety equipment. Patient does total self care    Fall Risk  Fall Risk Assessment, last 12 mths 3/19/2019   Able to walk? Yes   Fall in past 12 months? No       Abuse Screen  Abuse Screening Questionnaire 3/19/2019   Do you ever feel afraid of your partner? N   Are you in a relationship with someone who physically or mentally threatens you? N   Is it safe for you to go home? Y        Cognitive Screening   Evaluation of Cognitive Function:  Has your family/caregiver stated any concerns about your memory: no  Normal    Patient Care Team   Patient Care Team:  JEANNE De La Cruz as PCP - General (Physician Assistant)  Yuval Judge MD (Neurology)    Assessment/Plan   Education and counseling provided:  Are appropriate based on today's review and evaluation  End-of-Life planning (with patient's consent)  Pneumococcal Vaccine  Colorectal cancer screening tests  Screening for glaucoma    Diagnoses and all orders for this visit:    1. Initial Medicare annual wellness visit   - Medicare Wellness visit completed  - Health screenings reviewed  - Recommended yearly eye exam   - Prevnar 13 given today  - FIT test ordered for CRCS   - Age and gender specific counseling provided      6. Screening for colon cancer  Orders:    -     OCCULT BLOOD IMMUNOASSAY,DIAGNOSTIC; Future    7.  Encounter for immunization  Orders:    -     ADMIN PNEUMOCOCCAL VACCINE  -     PNEUMOCOCCAL CONJ VACCINE 13 VALENT IM         Health Maintenance Due Topic Date Due    Hepatitis C Screening  1950    DTaP/Tdap/Td series (1 - Tdap) 06/06/1973    Shingrix Vaccine Age 50> (1 of 2) 06/06/2002    GLAUCOMA SCREENING Q2Y  06/06/2017    FOBT Q 1 YEAR AGE 50-75  05/18/2019         David Garcia, PA   6/19/2019   9:36 AM

## 2019-06-19 NOTE — PROGRESS NOTES
Patient: Davis Lopes MRN: [id number]  SSN: [id number]    YOB: 1950  Age: 81 y.o. Sex: female      Date of Service: 2019   Provider: JEANNE Sweeney   Office Location:   [address] [place] [address] Dr Kishan azul, [address]  Office Phone: [phone]  Office Fax: [phone]      REASON FOR VISIT:   Chief Complaint   Patient presents with    Annual Wellness Visit    Cholesterol Problem    Hypertension    Tremors     right hand (has seen neurology)        VITALS:   Visit Vitals  BP (!) 162/94   Pulse 68   Temp 97.9 °F (36.6 °C) (Oral)   Resp 16   Ht 5' 8.25\" (1.734 m)   Wt 142 lb (64.4 kg)   SpO2 99%   BMI 21.43 kg/m²        MEDICATIONS:   Current Outpatient Medications on File Prior to Visit   Medication Sig Dispense Refill    valsartan-hydroCHLOROthiazide (DIOVAN-HCT) 160-25 mg per tablet TAKE 1 TABLET BY MOUTH DAILY 90 Tab 0    amLODIPine (NORVASC) 5 mg tablet TAKE 1 TABLET BY MOUTH DAILY 90 Tab 1    carbidopa-levodopa (SINEMET)  mg per tablet TAKE 1 TABLET BY MOUTH THREE TIMES DAILY 270 Tab 6    rOPINIRole (REQUIP) 0.25 mg tablet TAKE 1 TABLET BY MOUTH THREE TIMES DAILY 270 Tab 6    rosuvastatin (CRESTOR) 20 mg tablet Take 1 Tab by mouth nightly. 90 Tab 0    calcium carbonate/vitamin D3 (CALCIUM + D PO) Take  by mouth.  cholecalciferol, VITAMIN D3, (VITAMIN D3) 5,000 unit tab tablet Take  by mouth daily.  cyanocobalamin, vitamin B-12, (VITAMIN B-12 PO) Take  by mouth. No current facility-administered medications on file prior to visit.          ALLERGIES:   No Known Allergies     MEDICAL/SURGICAL HISTORY:  Past Medical History:   Diagnosis Date    Bladder perforation, intraoperative     during hysterectomy    Hyperlipidemia     Hypertension     Osteopenia     Parkinson disease ([address]     Prediabetes       Past Surgical History:   Procedure Laterality Date    HX  SECTION      HX JEROD AND BSO      [place] EXTRAC ERUPTED TOOTH/EXPOSED ROOT          FAMILY HISTORY:  Family History   Problem Relation Age of Onset    Hypertension Mother     Hypertension Father     Hypertension Brother     Hypertension Brother     Kidney Disease Brother         SOCIAL HISTORY:  Social History     Tobacco Use    Smoking status: Never Smoker    Smokeless tobacco: Never Used   Substance Use Topics    Alcohol use: No    Drug use: No             HISTORY OF PRESENT ILLNESS: Anne Cabrera is a 81 y.o. female who presents to the office for a routine follow up visit. Hypertension -   BP noted to be markedly elevated on intake today (190/100) improved to 162/94 with about 30 min of rest.   Patient takes Diovan -25 mg daily and amlodipine 5 mg daily. She does monitor her BP at home and for the most part it ranges 539-281 systolic. She was seen in the ED in January with complaints of chest pain that was ultimately thought to be musculoskeletal following a negative cardiac workup. Patient states she has had no recurrence of chest discomfort.        Pre-DM -   Last A1c 5.9% on 3/20/19  Patient reports that she has been working on following a healthy diet and trying to get regular exercise.      Hyperlipidemia -  Reports compliance with Crestor 20 mg qhs  Last lipid panel done 3/20/19 showed total cholesterol 258, , HDL 63, and MNJC 17     Parkinson Disease -   Diagnosed last year, doing well on Sinemet. Patient is followed by neurology (Dr. Jose Hein) and has also been working with physical therapy for balance training. Today, she continues to complain of a deformity to her R hand. She states it \"looks like a claw. \" It seems that she is having some weakness of her extensor muscles, predominantly affecting her 3rd-5th digit. This has been going on for several months and she cannot recall any inciting injury or trigger for her symptoms. She denies any pain, numbness, tingling, or proximal weakness.  She discussed with her neurologist who advised her that it was most likely due to her PD. Patient states the contracture is not terribly painful or bothersome. Not interested in OT at this time. REVIEW OF SYSTEMS:  Review of Systems   Constitutional: Negative for chills, fever and malaise/fatigue. Eyes: Negative for blurred vision and double vision. Respiratory: Negative for cough, shortness of breath and wheezing. Cardiovascular: Negative for chest pain, palpitations and leg swelling. Gastrointestinal: Positive for constipation ( occasional). Negative for abdominal pain, blood in stool, diarrhea, nausea and vomiting. Neurological: Positive for tremors. Negative for dizziness and headaches. PHYSICAL EXAMINATION:  Physical Exam   Constitutional: She is oriented to person, place, and time and well-developed, well-nourished, and in no distress. Cardiovascular: Normal rate, regular rhythm and normal heart sounds. Exam reveals no gallop and no friction rub. No murmur heard. Pulmonary/Chest: Effort normal and breath sounds normal. She has no wheezes. She has no rales. Neurological: She is alert and oriented to person, place, and time. Gait normal.   mild contracture to R hand noted   Skin: Skin is warm and dry. No rash noted. Psychiatric: Mood, memory and affect normal.        RESULTS:  No results found for this visit on 06/19/19. ASSESSMENT/PLAN:  Diagnoses and all orders for this visit:    1. Essential hypertension with goal blood pressure less than 140/90  - Blood pressure has been a bit challenging to control as she has had some issues with orthostatic hypotension in the past (likely exacerbated by her PD)   - However, we discussed that based on her markedly elevated BP on intake today and persistently elevated readings at home, I feel it would be best to increase the amlodipine back to 10 mg   - Will have her return in 2 weeks for nurse BP check  Orders:    -     amLODIPine (NORVASC) 10 mg tablet;  Take 1 Tab by mouth daily. 2. Prediabetes  - Stable, patient at optimal weight  - Continue to follow healthy diet and get regular exercise, will monitor   Orders:    -     METABOLIC PANEL, COMPREHENSIVE; Future  -     LIPID PANEL; Future  -     HEMOGLOBIN A1C W/O EAG; Future    3. Dyslipidemia  - LDL still elevated on high dose statin  - Continue to monitor  - consider adding Zetia in the future  Orders:    -     rosuvastatin (CRESTOR) 20 mg tablet; Take 1 Tab by mouth nightly. -     METABOLIC PANEL, COMPREHENSIVE; Future  -     LIPID PANEL; Future    4. Parkinson disease ([facility] [address]  - Stable, followed by neurology  - Specialist notes reviewed   - encouraged regular follow up as scheduled  - Patient will consider OT for her hand and call back if she would like a referral.        Follow-up and Dispositions    · Return in about 2 weeks (around 7/3/2019) for  blood pressure check, 3 months for routine care and please have 10 hour fasting labs done 3-7 days . All questions answered. Patient expresses understanding and agrees with the plan as detailed above.     PATIENT CARE TEAM:   Patient Care Team:  JEANNE Griffin as PCP - General (Physician Assistant)  Evelia Barton MD (Neurology)       JEANNE Nicholas   June 19, 2019   9:34 AM

## 2019-06-19 NOTE — PATIENT INSTRUCTIONS
Well Visit, Ages 25 to 48: Care Instructions Your Care Instructions Physical exams can help you stay healthy. Your doctor has checked your overall health and may have suggested ways to take good care of yourself. He or she also may have recommended tests. At home, you can help prevent illness with healthy eating, regular exercise, and other steps. Follow-up care is a key part of your treatment and safety. Be sure to make and go to all appointments, and call your doctor if you are having problems. It's also a good idea to know your test results and keep a list of the medicines you take. How can you care for yourself at home? · Reach and stay at a healthy weight. This will lower your risk for many problems, such as obesity, diabetes, heart disease, and high blood pressure. · Get at least 30 minutes of physical activity on most days of the week. Walking is a good choice. You also may want to do other activities, such as running, swimming, cycling, or playing tennis or team sports. Discuss any changes in your exercise program with your doctor. · Do not smoke or allow others to smoke around you. If you need help quitting, talk to your doctor about stop-smoking programs and medicines. These can increase your chances of quitting for good. · Talk to your doctor about whether you have any risk factors for sexually transmitted infections (STIs). Having one sex partner (who does not have STIs and does not have sex with anyone else) is a good way to avoid these infections. · Use birth control if you do not want to have children at this time. Talk with your doctor about the choices available and what might be best for you. · Protect your skin from too much sun. When you're outdoors from 10 a.m. to 4 p.m., stay in the shade or cover up with clothing and a hat with a wide brim. Wear sunglasses that block UV rays. Even when it's cloudy, put broad-spectrum sunscreen (SPF 30 or higher) on any exposed skin. · See a dentist one or two times a year for checkups and to have your teeth cleaned. · Wear a seat belt in the car. · Drink alcohol in moderation, if at all. That means no more than 2 drinks a day for men and 1 drink a day for women. Follow your doctor's advice about when to have certain tests. These tests can spot problems early. For everyone · Cholesterol. Have the fat (cholesterol) in your blood tested after age 21. Your doctor will tell you how often to have this done based on your age, family history, or other things that can increase your risk for heart disease. · Blood pressure. Have your blood pressure checked during a routine doctor visit. Your doctor will tell you how often to check your blood pressure based on your age, your blood pressure results, and other factors. · Vision. Talk with your doctor about how often to have a glaucoma test. 
· Diabetes. Ask your doctor whether you should have tests for diabetes. · Colon cancer. Have a test for colon cancer at age 48. You may have one of several tests. If you are younger than 48, you may need a test earlier if you have any risk factors. Risk factors include whether you already had a precancerous polyp removed from your colon or whether your parent, brother, sister, or child has had colon cancer. For women · Breast exam and mammogram. Talk to your doctor about when you should have a clinical breast exam and a mammogram. Medical experts differ on whether and how often women under 50 should have these tests. Your doctor can help you decide what is right for you. · Pap test and pelvic exam. Begin Pap tests at age 24. A Pap test is the best way to find cervical cancer. The test often is part of a pelvic exam. Ask how often to have this test. 
· Tests for sexually transmitted infections (STIs). Ask whether you should have tests for STIs. You may be at risk if you have sex with more than one person, especially if your partners do not wear condoms. For men · Tests for sexually transmitted infections (STIs). Ask whether you should have tests for STIs. You may be at risk if you have sex with more than one person, especially if you do not wear a condom. · Testicular cancer exam. Ask your doctor whether you should check your testicles regularly. · Prostate exam. Talk to your doctor about whether you should have a blood test (called a PSA test) for prostate cancer. Experts differ on whether and when men should have this test. Some experts suggest it if you are older than 39 and are -American or have a father or brother who got prostate cancer when he was younger than 72. When should you call for help? Watch closely for changes in your health, and be sure to contact your doctor if you have any problems or symptoms that concern you. Where can you learn more? Go to http://hugo-pee.info/. Enter P072 in the search box to learn more about \"Well Visit, Ages 25 to 48: Care Instructions. \" Current as of: March 28, 2018 Content Version: 11.9 © 4351-3993 Need. Care instructions adapted under license by [employer] (which disclaims liability or warranty for this information). If you have questions about a medical condition or this instruction, always ask your healthcare professional. [place] [address] any warranty or liability for your use of this information. Follow up in 2 weeks for a blood pressure check, 3 months for routine care and please have 10 hour fasting labs done 3-7 days prior (30 minutes) Medicare Wellness Visit, Female The best way to live healthy is to have a lifestyle where you eat a well-balanced diet, exercise regularly, limit alcohol use, and quit all forms of tobacco/nicotine, if applicable. Regular preventive services are another way to keep healthy.  Preventive services (vaccines, screening tests, monitoring & exams) can help personalize your care plan, which helps you manage your own care. Screening tests can find health problems at the earliest stages, when they are easiest to treat. [facility] follows the current, evidence-based guidelines published by the [facility] Nick Mcdonald ([facility]) when recommending preventive services for our patients. Because we follow these guidelines, sometimes recommendations change over time as research supports it. (For example, mammograms used to be recommended annually. Even though Medicare will still pay for an annual mammogram, the newer guidelines recommend a mammogram every two years for women of average risk.) Of course, you and your doctor may decide to screen more often for some diseases, based on your risk and your health status. Preventive services for you include: - Medicare offers their members a free annual wellness visit, which is time for you and your primary care provider to discuss and plan for your preventive service needs. Take advantage of this benefit every year! 
-All adults over the age of 72 should receive the recommended pneumonia vaccines. Current USPSTF guidelines recommend a series of two vaccines for the best pneumonia protection.  
-All adults should have a flu vaccine yearly and a tetanus vaccine every 10 years. All adults age 61 and older should receive a shingles vaccine once in their lifetime.   
-A bone mass density test is recommended when a woman turns 65 to screen for osteoporosis. This test is only recommended one time, as a screening. Some providers will use this same test as a disease monitoring tool if you already have osteoporosis.  
-All adults age 38-68 who are overweight should have a diabetes screening test once every three years.  
-Other screening tests and preventive services for persons with diabetes include: an eye exam to screen for diabetic retinopathy, a kidney function test, a foot exam, and stricter control over your cholesterol.  
-Cardiovascular screening for adults with routine risk involves an electrocardiogram (ECG) at intervals determined by your doctor.  
-Colorectal cancer screenings should be done for adults age 54-65 with no increased risk factors for colorectal cancer. There are a number of acceptable methods of screening for this type of cancer. Each test has its own benefits and drawbacks. Discuss with your doctor what is most appropriate for you during your annual wellness visit. The different tests include: colonoscopy (considered the best screening method), a fecal occult blood test, a fecal DNA test, and sigmoidoscopy. -Breast cancer screenings are recommended every other year for women of normal risk, age 54-69. 
-Cervical cancer screenings for women over age 72 are only recommended with certain risk factors.  
-All adults born between [facility] should be screened once for Hepatitis C. Here is a list of your current Health Maintenance items (your personalized list of preventive services) with a due date: 
Health Maintenance Due Topic Date Due  
 Hepatitis C Test  1950  DTaP/Tdap/Td  (1 - Tdap) 06/06/1973  Shingles Vaccine (1 of 2) 06/06/2002  Glaucoma Screening   06/06/2017  Pneumococcal Vaccine (1 of 2 - PCV13) 06/06/2017 [facility] Annual Well Visit  04/21/2019  Stool testing for trace blood  05/18/2019

## 2019-06-19 NOTE — PROGRESS NOTES
Physician order obtained. Patient completed adult immunization consent form. Allergies, contraindications and recommendations reviewed with patient. Prevnar 13 vaccine administered IM left deltoid. Patient tolerated well. Patient remained in office for 15 minutes after injection and no adverse reactions were noted.

## 2019-06-19 NOTE — PROGRESS NOTES
1. Have you been to the ER, urgent care clinic since your last visit? Hospitalized since your last visit? No    2. Have you seen or consulted any other health care providers outside of the [address] [place] [address] since your last visit? Include any pap smears or colon screening. No     Abuse Screening Questionnaire 3/19/2019   Do you ever feel afraid of your partner? N   Are you in a relationship with someone who physically or mentally threatens you? N   Is it safe for you to go home? Y     Fall Risk Assessment, last 12 mths 3/19/2019   Able to walk? Yes   Fall in past 12 months?  No             3 most recent PHQ Screens 3/19/2019   Little interest or pleasure in doing things Not at all   Feeling down, depressed, irritable, or hopeless Not at all   Total Score PHQ 2 0

## 2019-07-03 ENCOUNTER — CLINICAL SUPPORT (OUTPATIENT)
Dept: FAMILY MEDICINE CLINIC | Age: 69
End: 2019-07-03

## 2019-07-03 VITALS
HEIGHT: 68 IN | TEMPERATURE: 98 F | WEIGHT: 142 LBS | RESPIRATION RATE: 16 BRPM | SYSTOLIC BLOOD PRESSURE: 134 MMHG | DIASTOLIC BLOOD PRESSURE: 76 MMHG | HEART RATE: 77 BPM | OXYGEN SATURATION: 98 % | BODY MASS INDEX: 21.52 KG/M2

## 2019-07-03 DIAGNOSIS — I10 ESSENTIAL HYPERTENSION: Primary | ICD-10-CM

## 2019-07-03 NOTE — PROGRESS NOTES
Here today for nurse BP check. Blood pressure has improved dramatically since last visit with increase in amlodipine. Patient brought home BP log for my review, with readings that range from 122/75 to 149/84, averaging in the 130s/70s. Advised Ms. Hal Ruben to continue her current regimen and follow up as scheduled in September.

## 2019-07-03 NOTE — PROGRESS NOTES
1. Have you been to the ER, urgent care clinic since your last visit? Hospitalized since your last visit? No    2. Have you seen or consulted any other health care providers outside of the [address] [place] [address] since your last visit? Include any pap smears or colon screening.  No

## 2019-07-17 ENCOUNTER — HOSPITAL ENCOUNTER (OUTPATIENT)
Dept: LAB | Age: 69
Discharge: HOME | End: 2019-07-17
Payer: MEDICARE

## 2019-07-17 DIAGNOSIS — Z12.11 SCREENING FOR COLON CANCER: ICD-10-CM

## 2019-07-17 PROCEDURE — 82274 ASSAY TEST FOR BLOOD FECAL: CPT

## 2019-07-22 LAB — HEMOCCULT STL QL IA: NEGATIVE

## 2019-09-07 DIAGNOSIS — I10 ESSENTIAL HYPERTENSION WITH GOAL BLOOD PRESSURE LESS THAN 140/90: Chronic | ICD-10-CM

## 2019-09-08 RX ORDER — VALSARTAN AND HYDROCHLOROTHIAZIDE 160; 25 MG/1; MG/1
TABLET ORAL
Qty: 90 TAB | Refills: 0 | Status: SHIPPED | OUTPATIENT
Start: 2019-09-08 | End: 2019-12-02 | Stop reason: SDUPTHER

## 2019-09-09 ENCOUNTER — TELEPHONE (OUTPATIENT)
Dept: NEUROLOGY | Age: 69
End: 2019-09-09

## 2019-09-09 ENCOUNTER — HOSPITAL ENCOUNTER (OUTPATIENT)
Dept: LAB | Age: 69
Discharge: HOME | End: 2019-09-09
Payer: MEDICARE

## 2019-09-09 DIAGNOSIS — E78.5 DYSLIPIDEMIA: ICD-10-CM

## 2019-09-09 DIAGNOSIS — R73.03 PREDIABETES: ICD-10-CM

## 2019-09-09 LAB
ALBUMIN SERPL-MCNC: 4.1 G/DL (ref 3.4–5)
ALBUMIN/GLOB SERPL: 1.1 {RATIO} (ref 0.8–1.7)
ALP SERPL-CCNC: 90 U/L (ref 45–117)
ALT SERPL-CCNC: 38 U/L (ref 13–56)
ANION GAP SERPL CALC-SCNC: 6 MMOL/L (ref 3–18)
AST SERPL-CCNC: 21 U/L (ref 10–38)
BILIRUB SERPL-MCNC: 0.7 MG/DL (ref 0.2–1)
BUN SERPL-MCNC: 14 MG/DL (ref 7–18)
BUN/CREAT SERPL: 19 (ref 12–20)
CALCIUM SERPL-MCNC: 9.2 MG/DL (ref 8.5–10.1)
CHLORIDE SERPL-SCNC: 105 MMOL/L (ref 100–111)
CHOLEST SERPL-MCNC: 267 MG/DL
CO2 SERPL-SCNC: 29 MMOL/L (ref 21–32)
CREAT SERPL-MCNC: 0.73 MG/DL (ref 0.6–1.3)
GLOBULIN SER CALC-MCNC: 3.6 G/DL (ref 2–4)
GLUCOSE SERPL-MCNC: 89 MG/DL (ref 74–99)
HBA1C MFR BLD: 5.7 % (ref 4.2–5.6)
HDLC SERPL-MCNC: 76 MG/DL (ref 40–60)
HDLC SERPL: 3.5 {RATIO} (ref 0–5)
LDLC SERPL CALC-MCNC: 177 MG/DL (ref 0–100)
LIPID PROFILE,FLP: ABNORMAL
POTASSIUM SERPL-SCNC: 3.9 MMOL/L (ref 3.5–5.5)
PROT SERPL-MCNC: 7.7 G/DL (ref 6.4–8.2)
SODIUM SERPL-SCNC: 140 MMOL/L (ref 136–145)
TRIGL SERPL-MCNC: 70 MG/DL (ref ?–150)
VLDLC SERPL CALC-MCNC: 14 MG/DL

## 2019-09-09 PROCEDURE — 36415 COLL VENOUS BLD VENIPUNCTURE: CPT

## 2019-09-09 PROCEDURE — 80061 LIPID PANEL: CPT

## 2019-09-09 PROCEDURE — 83036 HEMOGLOBIN GLYCOSYLATED A1C: CPT

## 2019-09-09 PROCEDURE — 80053 COMPREHEN METABOLIC PANEL: CPT

## 2019-09-09 NOTE — TELEPHONE ENCOUNTER
Pt requested sooner appt due to pain in her ankle and left leg/thigh/waist.. She inquires if pain is affiliated with her Parkinson's Disease. Appt scheduled for pt on Tuesday 9/10 with LEOPOLDO Montano.

## 2019-09-11 ENCOUNTER — OFFICE VISIT (OUTPATIENT)
Dept: FAMILY MEDICINE CLINIC | Age: 69
End: 2019-09-11

## 2019-09-11 ENCOUNTER — HOSPITAL ENCOUNTER (OUTPATIENT)
Dept: GENERAL RADIOLOGY | Age: 69
Discharge: HOME | End: 2019-09-11
Payer: MEDICARE

## 2019-09-11 VITALS
DIASTOLIC BLOOD PRESSURE: 80 MMHG | SYSTOLIC BLOOD PRESSURE: 132 MMHG | HEIGHT: 68 IN | WEIGHT: 143 LBS | RESPIRATION RATE: 16 BRPM | HEART RATE: 77 BPM | TEMPERATURE: 98.5 F | OXYGEN SATURATION: 98 % | BODY MASS INDEX: 21.67 KG/M2

## 2019-09-11 DIAGNOSIS — M79.605 LEFT LEG PAIN: ICD-10-CM

## 2019-09-11 DIAGNOSIS — M54.32 SCIATICA, LEFT SIDE: ICD-10-CM

## 2019-09-11 DIAGNOSIS — G20 PARKINSON DISEASE (HCC): ICD-10-CM

## 2019-09-11 DIAGNOSIS — I10 ESSENTIAL HYPERTENSION: Primary | ICD-10-CM

## 2019-09-11 DIAGNOSIS — M54.16 LUMBAR RADICULOPATHY: ICD-10-CM

## 2019-09-11 DIAGNOSIS — R73.03 PREDIABETES: ICD-10-CM

## 2019-09-11 DIAGNOSIS — M51.36 DDD (DEGENERATIVE DISC DISEASE), LUMBAR: ICD-10-CM

## 2019-09-11 DIAGNOSIS — E78.5 DYSLIPIDEMIA: ICD-10-CM

## 2019-09-11 PROCEDURE — 72100 X-RAY EXAM L-S SPINE 2/3 VWS: CPT

## 2019-09-11 RX ORDER — METHYLPREDNISOLONE 4 MG/1
TABLET ORAL
Qty: 1 DOSE PACK | Refills: 0 | Status: SHIPPED | OUTPATIENT
Start: 2019-09-11 | End: 2019-10-07 | Stop reason: ALTCHOICE

## 2019-09-11 RX ORDER — AMLODIPINE BESYLATE 10 MG/1
10 TABLET ORAL DAILY
Qty: 90 TAB | Refills: 1 | Status: SHIPPED | OUTPATIENT
Start: 2019-09-11 | End: 2020-03-09 | Stop reason: SDUPTHER

## 2019-09-11 NOTE — PROGRESS NOTES
Patient: Barby Salmeron MRN: [id number]  SSN: [id number]    YOB: 1950  Age: 81 y.o. Sex: female      Date of Service: 9/11/2019   Provider: JEANNE Amaro   Office Location:   [address] Kishan azul, [address]  Office Phone: [phone]  Office Fax: [phone]      REASON FOR VISIT:   Chief Complaint   Patient presents with    Cholesterol Problem    Hypertension    Leg Pain     c/o left leg pain, starting from mid shin area going upward towards leg/thigh area. (feels hot)         VITALS:   Visit Vitals  /80 (BP 1 Location: Left arm, BP Patient Position: Sitting)   Pulse 77   Temp 98.5 °F (36.9 °C) (Oral)   Resp 16   Ht 5' 8.25\" (1.734 m)   Wt 143 lb (64.9 kg)   SpO2 98%   BMI 21.58 kg/m²        MEDICATIONS:   Current Outpatient Medications on File Prior to Visit   Medication Sig Dispense Refill    valsartan-hydroCHLOROthiazide (DIOVAN-HCT) 160-25 mg per tablet TAKE 1 TABLET BY MOUTH DAILY 90 Tab 0    rosuvastatin (CRESTOR) 20 mg tablet Take 1 Tab by mouth nightly. 90 Tab 0    amLODIPine (NORVASC) 10 mg tablet Take 1 Tab by mouth daily. 90 Tab 0    carbidopa-levodopa (SINEMET)  mg per tablet TAKE 1 TABLET BY MOUTH THREE TIMES DAILY 270 Tab 6    rOPINIRole (REQUIP) 0.25 mg tablet TAKE 1 TABLET BY MOUTH THREE TIMES DAILY 270 Tab 6    calcium carbonate/vitamin D3 (CALCIUM + D PO) Take  by mouth.  cholecalciferol, VITAMIN D3, (VITAMIN D3) 5,000 unit tab tablet Take  by mouth daily.  cyanocobalamin, vitamin B-12, (VITAMIN B-12 PO) Take  by mouth. No current facility-administered medications on file prior to visit.          ALLERGIES:   No Known Allergies     MEDICAL/SURGICAL HISTORY:  Past Medical History:   Diagnosis Date    Bladder perforation, intraoperative     during hysterectomy    Hyperlipidemia     Hypertension 1990    Osteopenia     Parkinson disease ([address]     Prediabetes       Past Surgical History:   Procedure Laterality Date    HX  SECTION      HX JEROD AND BSO      SD EXTRAC ERUPTED TOOTH/EXPOSED ROOT          FAMILY HISTORY:  Family History   Problem Relation Age of Onset    Hypertension Mother     Hypertension Father     Hypertension Brother     Hypertension Brother     Kidney Disease Brother         SOCIAL HISTORY:  Social History     Tobacco Use    Smoking status: Never Smoker    Smokeless tobacco: Never Used   Substance Use Topics    Alcohol use: No    Drug use: No             HISTORY OF PRESENT ILLNESS: Jesus Flor is a 81 y.o. female who presents to the office for a routine follow up visit. Hypertension -   BP stable today   Patient takes Diovan -25 mg daily and amlodipine 10 mg daily. She does monitor her BP at home and states it has been at goal.   She was seen in the ED in January with complaints of chest pain that was ultimately thought to be musculoskeletal following a negative cardiac workup. Patient states she has had no recurrence of chest discomfort.        Pre-DM -   Last A1c 5.7% on 19 (improved from previous)   Patient reports that she has been working on following a healthy diet and trying to get regular exercise.     Hyperlipidemia -  Reports compliance with Crestor 20 mg qhs, though still with elevated LDL. Last lipid panel done 19 showed total cholesterol 267, , HDL 76, and trig 70       Parkinson Disease -   Diagnosed last year, doing well on Sinemet. Patient is followed by neurology (Dr. Nora Kirkpatrick, NP)     Leg Pain -   Acutely, patient complains of a \"hot, burning\" pain down her left leg. Starts in her upper thigh, radiates down the lateral aspect of her leg, crossing over to the medial aspect at about the level of her ankle. She states pain began suddenly on  after she stood up in [employer].  It has been relatively constant ever since, but worsens with walking/weight bearing and improves with rest. She does have some known lumbar degenerative disc disease and states she's also had some ongoing left sided low back pain, but is unsure if it's related. Denies any skin rashes or muscle weakness on her L leg    REVIEW OF SYSTEMS:  Review of Systems   Constitutional: Negative for chills and fever. Respiratory: Negative for cough, shortness of breath and wheezing. Cardiovascular: Negative for chest pain, palpitations and leg swelling. Musculoskeletal: Positive for back pain and myalgias. PHYSICAL EXAMINATION:  Physical Exam   Constitutional: She is oriented to person, place, and time and well-developed, well-nourished, and in no distress. Cardiovascular: Normal rate, regular rhythm and normal heart sounds. Exam reveals no gallop and no friction rub. No murmur heard. Pulmonary/Chest: Effort normal and breath sounds normal. She has no wheezes. She has no rales. Musculoskeletal:        Lumbar back: She exhibits normal range of motion, no bony tenderness, no edema and no deformity. Neurological: She is alert and oriented to person, place, and time. She has normal sensation, normal strength and normal reflexes. Gait normal.   Skin: Skin is warm and dry. No rash noted. Psychiatric: Mood, memory and affect normal.        RESULTS:  Lab Results   Component Value Date/Time    Sodium 140 09/09/2019 10:26 AM    Potassium 3.9 09/09/2019 10:26 AM    Chloride 105 09/09/2019 10:26 AM    CO2 29 09/09/2019 10:26 AM    Anion gap 6 09/09/2019 10:26 AM    Glucose 89 09/09/2019 10:26 AM    BUN 14 09/09/2019 10:26 AM    Creatinine 0.73 09/09/2019 10:26 AM    BUN/Creatinine ratio 19 09/09/2019 10:26 AM    GFR est AA >60 09/09/2019 10:26 AM    GFR est non-AA >60 09/09/2019 10:26 AM    Calcium 9.2 09/09/2019 10:26 AM    Bilirubin, total 0.7 09/09/2019 10:26 AM    AST (SGOT) 21 09/09/2019 10:26 AM    Alk.  phosphatase 90 09/09/2019 10:26 AM    Protein, total 7.7 09/09/2019 10:26 AM    Albumin 4.1 09/09/2019 10:26 AM Globulin 3.6 09/09/2019 10:26 AM    A-G Ratio 1.1 09/09/2019 10:26 AM    ALT (SGPT) 38 09/09/2019 10:26 AM      Lab Results   Component Value Date/Time    Cholesterol, total 267 (H) 09/09/2019 10:26 AM    HDL Cholesterol 76 (H) 09/09/2019 10:26 AM    LDL, calculated 177 (H) 09/09/2019 10:26 AM    VLDL, calculated 14 09/09/2019 10:26 AM    Triglyceride 70 09/09/2019 10:26 AM    CHOL/HDL Ratio 3.5 09/09/2019 10:26 AM      Lab Results   Component Value Date/Time    Hemoglobin A1c 5.7 (H) 09/09/2019 10:27 AM        ASSESSMENT/PLAN:  Diagnoses and all orders for this visit:    1. Essential hypertension  - BP stable  - Continue current regimen   -     amLODIPine (NORVASC) 10 mg tablet; Take 1 Tab by mouth daily. 2. Prediabetes  - Stable, improved  - Continue to work on diet and exercise     3. Dyslipidemia  - Stable, continue Crestor  - continue to work on diet   - May need to consider adding Zetia at some point if LDL remains elevated. Suspect this is likely genetic/familial     4. Parkinson disease ([facility] [address]  - f/u with neurology as scheduled     5. Left leg pain  6. Sciatica, left side  - Pain seems to be consistent with L4/L5 dermatome. Advised her to monitor closely for development of a shingles rash, though I suspect this is more likely a lumbar radiculopathy   - Start medrol dose pack as below  - obtain updated lumbar plain films  Orders:    -     methylPREDNISolone (MEDROL, DEONDRE,) 4 mg tablet; Take as directed per package insert  -     XR SPINE LUMB 2 OR 3 V; Future         All questions answered. Patient expresses understanding and agrees with the plan as detailed above. Follow-up and Dispositions    · Return in about 6 months (around 3/11/2020) for cholesterol / HTN.           PATIENT CARE TEAM:   Patient Care Team:  JEANNE Childers as PCP - General (Physician Assistant)  Romina Betancourt MD (Neurology)       JEANNE Dominguez   September 11, 2019   8:26 AM

## 2019-09-11 NOTE — PROGRESS NOTES
1. Have you been to the ER, urgent care clinic since your last visit? Hospitalized since your last visit? No    2. Have you seen or consulted any other health care providers outside of the [address] [place] [address] since your last visit? Include any pap smears or colon screening. No       Annual eye exam: June of 2019 Deng Pimentel and Po Jane (will request)   Pneumococcal vaccine: 06/09/2019  Flu vaccine: Jono Romero declined yearly influenza vaccine   Patient instructed to remove shoes:  Yes

## 2019-09-11 NOTE — PATIENT INSTRUCTIONS
DASH Diet: Care Instructions  Your Care Instructions    The DASH diet is an eating plan that can help lower your blood pressure. DASH stands for Dietary Approaches to Stop Hypertension. Hypertension is high blood pressure. The DASH diet focuses on eating foods that are high in calcium, potassium, and magnesium. These nutrients can lower blood pressure. The foods that are highest in these nutrients are fruits, vegetables, low-fat dairy products, nuts, seeds, and legumes. But taking calcium, potassium, and magnesium supplements instead of eating foods that are high in those nutrients does not have the same effect. The DASH diet also includes whole grains, fish, and poultry. The DASH diet is one of several lifestyle changes your doctor may recommend to lower your high blood pressure. Your doctor may also want you to decrease the amount of sodium in your diet. Lowering sodium while following the DASH diet can lower blood pressure even further than just the DASH diet alone. Follow-up care is a key part of your treatment and safety. Be sure to make and go to all appointments, and call your doctor if you are having problems. It's also a good idea to know your test results and keep a list of the medicines you take. How can you care for yourself at home? Following the DASH diet  · Eat 4 to 5 servings of fruit each day. A serving is 1 medium-sized piece of fruit, ½ cup chopped or canned fruit, 1/4 cup dried fruit, or 4 ounces (½ cup) of fruit juice. Choose fruit more often than fruit juice. · Eat 4 to 5 servings of vegetables each day. A serving is 1 cup of lettuce or raw leafy vegetables, ½ cup of chopped or cooked vegetables, or 4 ounces (½ cup) of vegetable juice. Choose vegetables more often than vegetable juice. · Get 2 to 3 servings of low-fat and fat-free dairy each day. A serving is 8 ounces of milk, 1 cup of yogurt, or 1 ½ ounces of cheese. · Eat 6 to 8 servings of grains each day.  A serving is 1 slice of bread, 1 ounce of dry cereal, or ½ cup of cooked rice, pasta, or cooked cereal. Try to choose whole-grain products as much as possible. · Limit lean meat, poultry, and fish to 2 servings each day. A serving is 3 ounces, about the size of a deck of cards. · Eat 4 to 5 servings of nuts, seeds, and legumes (cooked dried beans, lentils, and split peas) each week. A serving is 1/3 cup of nuts, 2 tablespoons of seeds, or ½ cup of cooked beans or peas. · Limit fats and oils to 2 to 3 servings each day. A serving is 1 teaspoon of vegetable oil or 2 tablespoons of salad dressing. · Limit sweets and added sugars to 5 servings or less a week. A serving is 1 tablespoon jelly or jam, ½ cup sorbet, or 1 cup of lemonade. · Eat less than 2,300 milligrams (mg) of sodium a day. If you limit your sodium to 1,500 mg a day, you can lower your blood pressure even more. Tips for success  · Start small. Do not try to make dramatic changes to your diet all at once. You might feel that you are missing out on your favorite foods and then be more likely to not follow the plan. Make small changes, and stick with them. Once those changes become habit, add a few more changes. · Try some of the following:  ? Make it a goal to eat a fruit or vegetable at every meal and at snacks. This will make it easy to get the recommended amount of fruits and vegetables each day. ? Try yogurt topped with fruit and nuts for a snack or healthy dessert. ? Add lettuce, tomato, cucumber, and onion to sandwiches. ? Combine a ready-made pizza crust with low-fat mozzarella cheese and lots of vegetable toppings. Try using tomatoes, squash, spinach, broccoli, carrots, cauliflower, and onions. ? Have a variety of cut-up vegetables with a low-fat dip as an appetizer instead of chips and dip. ? Sprinkle sunflower seeds or chopped almonds over salads. Or try adding chopped walnuts or almonds to cooked vegetables.   ? Try some vegetarian meals using beans and peas. Add garbanzo or kidney beans to salads. Make burritos and tacos with mashed louis beans or black beans. Where can you learn more? Go to http://hugo-pee.info/. Enter C126 in the search box to learn more about \"DASH Diet: Care Instructions. \"  Current as of: July 22, 2018  Content Version: 12.1  © 8407-4348 [employer]. Care instructions adapted under license by [employer] (which disclaims liability or warranty for this information). If you have questions about a medical condition or this instruction, always ask your healthcare professional. [address] any warranty or liability for your use of this information. Leg Pain: Care Instructions  Your Care Instructions  Many things can cause leg pain. Too much exercise or overuse can cause a muscle cramp (or charley horse). You can get leg cramps from not eating a balanced diet that has enough potassium, calcium, and other minerals. If you do not drink enough fluids or are taking certain medicines, you may develop leg cramps. Other causes of leg pain include injuries, blood flow problems, nerve damage, and twisted and enlarged veins (varicose veins). You can usually ease pain with self-care. Your doctor may recommend that you rest your leg and keep it elevated. Follow-up care is a key part of your treatment and safety. Be sure to make and go to all appointments, and call your doctor if you are having problems. It's also a good idea to know your test results and keep a list of the medicines you take. How can you care for yourself at home? · Take pain medicines exactly as directed. ? If the doctor gave you a prescription medicine for pain, take it as prescribed. ? If you are not taking a prescription pain medicine, ask your doctor if you can take an over-the-counter medicine. · Take any other medicines exactly as prescribed.  Call your doctor if you think you are having a problem with your medicine. · Rest your leg while you have pain, and avoid standing for long periods of time. · Prop up your leg at or above the level of your heart when possible. · Make sure you are eating a balanced diet that is rich in calcium, potassium, and magnesium, especially if you are pregnant. · If directed by your doctor, put ice or a cold pack on the area for 10 to 20 minutes at a time. Put a thin cloth between the ice and your skin. · Your leg may be in a splint, a brace, or an elastic bandage, and you may have crutches to help you walk. Follow your doctor's directions about how long to wear supports and how to use the crutches. When should you call for help? Call 911 anytime you think you may need emergency care. For example, call if:    · You have sudden chest pain and shortness of breath, or you cough up blood.     · Your leg is cool or pale or changes color.    Call your doctor now or seek immediate medical care if:    · You have increasing or severe pain.     · Your leg suddenly feels weak and you cannot move it.     · You have signs of a blood clot, such as:  ? Pain in your calf, back of the knee, thigh, or groin. ? Redness and swelling in your leg or groin.     · You have signs of infection, such as:  ? Increased pain, swelling, warmth, or redness. ? Red streaks leading from the sore area. ? Pus draining from a place on your leg. ? A fever.     · You cannot bear weight on your leg.    Watch closely for changes in your health, and be sure to contact your doctor if:    · You do not get better as expected. Where can you learn more? Go to http://hugo-pee.info/. Enter E253 in the search box to learn more about \"Leg Pain: Care Instructions. \"  Current as of: September 23, 2018  Content Version: 12.1  © 9661-8340 [employer]. Care instructions adapted under license by [employer] (which disclaims liability or warranty for this information).  If you have questions about a medical condition or this instruction, always ask your healthcare professional. [place] [address] any warranty or liability for your use of this information.

## 2019-09-12 NOTE — PROGRESS NOTES
MRI shows some progression of degenerative changes in her low back.  If symptoms do not improve upon completion of steroids, would recommend either a trial of physical therapy or MRI for further evaluation

## 2019-09-13 NOTE — PROGRESS NOTES
Received incoming from Mrs. Glo Cheung who stated she has just spoken with her sister who had the same issue has her. Per Mrs. Glo Cheung her sister has requested her to ask [facility] to increase her medrol from 10 mg to 20 mg, opt out of PT and just go ahead with the MRI.  Please advise

## 2019-09-13 NOTE — PROGRESS NOTES
[phone] (home) all identifiers verified ( and last 4 of last of SS#) Mrs. Sylwia Gomez advised that her  [address] shows some progression of degenerative changes in her lower back, if  symptoms do not improve upon completion of steroids, Sean Acharya would recommend either a trial of physical therapy or MRI for further evaluation. Mrs. Sylwia Gomez voice understanding and wanted to know what she can take for pain due to ankle, leg and thigh pain while on steroids?

## 2019-09-16 ENCOUNTER — TELEPHONE (OUTPATIENT)
Dept: FAMILY MEDICINE CLINIC | Age: 69
End: 2019-09-16

## 2019-09-16 NOTE — PROGRESS NOTES
[phone] (home) Left message via voicemail that VADIM WATERS [facility] MRI ordered for further assessment, further plan of care to be determined based on results and central scheduling will be calling to schedule MRI.

## 2019-09-16 NOTE — TELEPHONE ENCOUNTER
Spoke with   Ketan Cons aware that Frank Ward has placed MRI order to be done and central scheduling will be call her to schedule procedure

## 2019-09-24 ENCOUNTER — TELEPHONE (OUTPATIENT)
Dept: FAMILY MEDICINE CLINIC | Age: 69
End: 2019-09-24

## 2019-09-24 NOTE — TELEPHONE ENCOUNTER
Spoke with Zhane Roxana Chirinos aware that per Dr. Gracie Riddle he can certainly see her for a follow-up to determine next steps after her MRI on Friday 09-, that he cannot prescribe these long-term medications since they have side effects/risks (Zhane Roxana Chirinos voice understanding) and We need to figure out why she is having pain and address root cause. Per Mrs. Roxana Chirinos she would like to wait to results come in to schedule.

## 2019-09-24 NOTE — TELEPHONE ENCOUNTER
Pt went to [facility] on 9/14/2019 on [address] [facility] [phone] for her sciatic nerve pain and was given Prednisone 20 mg and Cyclobenzaprine 10 mg and it helped a lot and she finished it on Friday and the pain came back over the weekend. She would like to know if she can get refills on the medication. She uses the [facility] [phone]. please advise.

## 2019-09-24 NOTE — TELEPHONE ENCOUNTER
We can certainly see her for a follow-up to determine next steps. I cannot prescribe these longterm since they have side effects and risks. We need to figure out why she is having pain and address root cause. Offer appointment please.

## 2019-09-24 NOTE — TELEPHONE ENCOUNTER
Spoke with Mrs. De La O Cargo is requesting a refill on methylPREDNISolone 20 mg and flexeril 10 mg (which were both given to her by [facility]) for left leg pain and sciatica. She has also spoken with [facility] who has given her the advise of taken 800 mg of motrin to help with the pain.  Araseli advise

## 2019-09-27 ENCOUNTER — HOSPITAL ENCOUNTER (OUTPATIENT)
Age: 69
Discharge: HOME | End: 2019-09-27
Attending: PHYSICIAN ASSISTANT
Payer: MEDICARE

## 2019-09-27 DIAGNOSIS — M79.605 LEFT LEG PAIN: ICD-10-CM

## 2019-09-27 DIAGNOSIS — M54.32 SCIATICA, LEFT SIDE: ICD-10-CM

## 2019-09-27 DIAGNOSIS — M54.16 LUMBAR RADICULOPATHY: ICD-10-CM

## 2019-09-27 DIAGNOSIS — M51.36 DDD (DEGENERATIVE DISC DISEASE), LUMBAR: ICD-10-CM

## 2019-09-27 PROCEDURE — 72148 MRI LUMBAR SPINE W/O DYE: CPT

## 2019-09-30 ENCOUNTER — TELEPHONE (OUTPATIENT)
Dept: FAMILY MEDICINE CLINIC | Age: 69
End: 2019-09-30

## 2019-09-30 DIAGNOSIS — M48.061 SPINAL STENOSIS OF LUMBAR REGION, UNSPECIFIED WHETHER NEUROGENIC CLAUDICATION PRESENT: Primary | ICD-10-CM

## 2019-09-30 NOTE — TELEPHONE ENCOUNTER
Pt would like to know if DR Jalen Luna will write the RX for the Prednisone 20 mg  and the Cyclobenzaprine 10 mg and hydrocodone-apap 5mg /325 mg  now that she had the MRI done. She uses [facility] [phone]. advised that DR Jalen Luna was out of the office and this would be addressed tomorrow when he returns.   Please advise

## 2019-09-30 NOTE — PROGRESS NOTES
Please refer to spine clinic due to MRI showing areas of arthritis that is likely affecting nerves in the back that travel to her leg. She has spinal stenosis from arthritis and disc bulges.

## 2019-09-30 NOTE — PROGRESS NOTES
[phone] all patient identifiers verified ( and last 4 of SS#) Mrs. Jn Luciano advised that per Dr. Tee Carrasquillo her  MRI showing areas of arthritis that is likely affecting nerves in the back that travel to her leg and she has spinal stenosis from arthritis and disc bulges. Dr. Tee Carrasquillo has referred her to Dr. Meghann Prakash Appointment time 9:00 am arrival 8:30 am for (NP paper work) Appointment DateL  [phone]. Mrs. Jn Luciano for understanding and repeated appointment information back to confirm.

## 2019-09-30 NOTE — TELEPHONE ENCOUNTER
Spoke with  Lenka Mariel aware that I will send her request to Dr. Uriel Gordon on her behalf. When ask what her pain level was she stated a 6-8 at times and she is dragging her leg to walk.  Please advise

## 2019-10-01 NOTE — TELEPHONE ENCOUNTER
Those are not the correct pills for what is going on. Those are not longterm medications that work to control the pain longterm. Please schedule and we can get try some of the correct medications and review her MRI with her. I cannot prescribe anything without doing my own eval and making sure she understands the diagnosis and the treatments.

## 2019-10-02 NOTE — TELEPHONE ENCOUNTER
Spoke with Mrs. Dano Sr to inform her that per Dr. Jacquie Ruano that the medications she is requesting are not the correct pills for what is going on with her leg pain, those are not longterm medications that work to control the pain longterm and Dr. Jacquie Ruano would like for her to schedule and appointment and we can get try some of the correct medications and review her MRI with her. He cannot prescribe anything without doing his own eval and making sure she understands the diagnosis and the treatments.     Appointment set for Monday 10-

## 2019-10-07 ENCOUNTER — OFFICE VISIT (OUTPATIENT)
Dept: FAMILY MEDICINE CLINIC | Age: 69
End: 2019-10-07

## 2019-10-07 ENCOUNTER — HOSPITAL ENCOUNTER (OUTPATIENT)
Dept: GENERAL RADIOLOGY | Age: 69
Discharge: HOME | End: 2019-10-07
Payer: MEDICARE

## 2019-10-07 VITALS
WEIGHT: 143 LBS | OXYGEN SATURATION: 98 % | RESPIRATION RATE: 16 BRPM | SYSTOLIC BLOOD PRESSURE: 128 MMHG | DIASTOLIC BLOOD PRESSURE: 74 MMHG | TEMPERATURE: 98.4 F | BODY MASS INDEX: 21.67 KG/M2 | HEIGHT: 68 IN | HEART RATE: 86 BPM

## 2019-10-07 DIAGNOSIS — M79.641 PAIN OF RIGHT HAND: ICD-10-CM

## 2019-10-07 DIAGNOSIS — M48.061 SPINAL STENOSIS OF LUMBAR REGION, UNSPECIFIED WHETHER NEUROGENIC CLAUDICATION PRESENT: Primary | ICD-10-CM

## 2019-10-07 PROCEDURE — 73130 X-RAY EXAM OF HAND: CPT

## 2019-10-07 RX ORDER — GABAPENTIN 100 MG/1
100 CAPSULE ORAL 2 TIMES DAILY
Qty: 60 CAP | Refills: 0 | Status: SHIPPED | OUTPATIENT
Start: 2019-10-07 | End: 2019-11-18 | Stop reason: SDUPTHER

## 2019-10-07 RX ORDER — GABAPENTIN 300 MG/1
300 CAPSULE ORAL 3 TIMES DAILY
Qty: 60 CAP | Refills: 0 | Status: SHIPPED | OUTPATIENT
Start: 2019-10-07 | End: 2019-10-07

## 2019-10-07 NOTE — PROGRESS NOTES
SUBJECTIVE  Chief Complaint   Patient presents with    Results     review of MRI results    Leg Pain    Medication Evaluation     Patient presents for follow-up of left sided radicular pain. No back pain. Feeling a lot better since onset. Says that pain is a dull 4/10 intermittently. Says that she has had relief with ibuprofen taken once daily. No help with PT. Seeing spine specialist in 4 weeks. No numbness or tingling. No weakness. Also mentions swelling back of right hand. Was told that this is secondary to Parkinsons. Cannot straighten her hand fully. OBJECTIVE    Blood pressure 128/74, pulse 86, temperature 98.4 °F (36.9 °C), temperature source Oral, resp. rate 16, height 5' 8.25\" (1.734 m), weight 143 lb (64.9 kg), SpO2 98 %. General:  Alert, cooperative, well appearing, in no apparent distress. Right hand:  Dorsal firm swelling. Appears as though tendons contracted. Nontender. No skin changes. Study Result 9/27/2019    MR Lumbar spine without contrast     HISTORY: lumbar radiculopathy   Height 81year-old female complains of acute hot burning pain radiating down the  left leg for over one month.     COMPARISON: None     Technique: Multi-sequence multiplanar T1, T2, STIR imaging with and without fat  saturation obtained centered on the lumbar spine.      FINDINGS:      Alignment: Intact lordosis  Vertebral body height: Normal  Marrow signal: Unremarkable  Disc spaces: Mild degenerative disc disease L4-L5, L5-S1. Conus: Terminates at L2  Miscellaneous: Bilateral suspected renal peripelvic cysts.     Axial imaging correlation:     Sagittal images T11-L1 demonstrate normal alignment, without central canal or  foraminal stenosis.     L1-2: No significant disc pathology. Patent canal and foramina. Mild-moderate  facet arthropathy.     L2-3: Mild asymmetric left-sided disc bulge and possible small left foraminal  annular fissure. Moderate bilateral facet arthropathy.  Minimal central canal  stenosis. Mild left greater than right foraminal stenoses. Moderate facet  arthropathy.     L3-4: Mild circumferential disc bulge and moderate facet arthropathy. Mild  spinal canal stenosis. Mild bilateral foraminal stenosis, right greater than  left. Moderate facet arthropathy.     L4-5: Mild to moderate disc bulge and bilateral lateral recess annular fissures. Severe facet arthropathy. Moderate central canal stenosis. Moderate left and  mild right foraminal stenoses.     L5-S1: Left lateral recess focal annular fissure with mild disc bulge. Moderate  facet arthropathy. Spinal canal patent. Mild foraminal stenoses.     Imaged sacrum: Unremarkable. .        IMPRESSION  IMPRESSION:  Degenerative changes and stenoses are most notable at levels L3-S1, with up to  moderate spinal canal and foraminal stenoses. See level by level details above. ASSESSMENT / PLAN    ICD-10-CM ICD-9-CM    1. Spinal stenosis of lumbar region, unspecified whether neurogenic claudication present M48.061 724.02 gabapentin (NEURONTIN) 100 mg capsule      DISCONTINUED: gabapentin (NEURONTIN) 300 mg capsule   2. Pain of right hand M79.641 729.5 XR HAND RT MIN 3 V   3. Right hand pain M79.641 729.5 XR HAND RT MIN 3 V     Spinal and foraminal stenosis - reviewed MRI. Discussed seeing spine center. She will start a trial of gabapentin 100mg po bid. She is advised on possible sedation. Right hand pain / swelling - x-rays for reassurance. 15 minutes of face to face time spent with the patient with at least 50% on counseling on above medical issues. All chart history elements were reviewed by me at the time of the visit even though marked at time of note closure. Patient understands our medical plan. Patient has provided input and agrees with goals. Alternatives have been explained and offered. All questions answered. The patient is to call if condition worsens or fails to improve.      Follow-up and Dispositions · Return in about 5 months (around 3/7/2020) for routine care care with PCP.

## 2019-10-07 NOTE — PATIENT INSTRUCTIONS
Cervical Spinal Stenosis: Care Instructions  Your Care Instructions    Spinal stenosis is a narrowing of the canal that surrounds the spinal cord and nerve roots. Sometimes bone and other tissue grow into this canal and press on the nerves that branch out from the spinal cord. This can happen as a part of aging. When the narrowing happens in your neck, it's called cervical spinal stenosis. It often causes stiffness, pain, numbness, and weakness in the neck, shoulders, arms, hands, or legs. It can even cause problems with your balance, coordination, and bowel or bladder control. But some people have no symptoms. You may be able to get relief from the symptoms of spinal stenosis by taking pain medicine. Your doctor may suggest physical therapy and exercises to keep your spine strong and flexible. Some people try steroid shots to reduce swelling. If pain and numbness in your neck, arms, or legs are still so bad that you cannot do your normal activities, you may need surgery. Follow-up care is a key part of your treatment and safety. Be sure to make and go to all appointments, and call your doctor if you are having problems. It's also a good idea to know your test results and keep a list of the medicines you take. How can you care for yourself at home? · Ask your doctor if you can take an over-the-counter pain medicine, such as acetaminophen (Tylenol), ibuprofen (Advil, Motrin), or naproxen (Aleve). Be safe with medicines. Read and follow all instructions on the label. · Do not take two or more pain medicines at the same time unless the doctor told you to. Many pain medicines have acetaminophen, which is Tylenol. Too much acetaminophen (Tylenol) can be harmful. · Change positions often when you are standing or sitting. This may reduce pressure on the spinal cord and its nerves. · When you rest, use pillows or towel rolls to support your neck and head in a comfortable position.   · Follow your doctor's instructions about activity. He or she may tell you not to do sports or activities that could injure your neck. · Stretch your neck and shoulders as your doctor or physical therapist recommends. If your doctor says it is okay to do them, these exercises may help:  ? Neck stretches to the side. Keep your shoulders relaxed and slowly tilt your head straight over toward one shoulder. Hold for 15 seconds. Let the weight of your head stretch your muscles. Then do the same toward the other shoulder. ? Neck rotations. Keep your chin level and slowly turn your head to one side. Hold for 15 seconds. Then do the same to the other side. ? Shoulder rolls. Roll your shoulders up, then back, and then down in a smooth, circular motion. Repeat several times. When should you call for help? Call 911 anytime you think you may need emergency care. For example, call if:    · You are unable to move an arm or a leg at all.   [facility] your doctor now or seek immediate medical care if:    · You have new or worse symptoms in your arms, legs, belly, or buttocks. Symptoms may include:  ? Numbness or tingling. ? Weakness. ? Pain.     · You lose bladder or bowel control.    Watch closely for changes in your health, and be sure to contact your doctor if:    · You do not get better as expected. Where can you learn more? Go to http://hugo-pee.info/. Enter  in the search box to learn more about \"Cervical Spinal Stenosis: Care Instructions. \"  Current as of: June 26, 2019  Content Version: 12.2  © 7654-2263 [employer]. Care instructions adapted under license by [employer] (which disclaims liability or warranty for this information). If you have questions about a medical condition or this instruction, always ask your healthcare professional. [address] any warranty or liability for your use of this information.

## 2019-10-07 NOTE — PROGRESS NOTES
1. Have you been to the ER, urgent care clinic since your last visit? Hospitalized since your last visit? No    2. Have you seen or consulted any other health care providers outside of the [address] [place] [address] since your last visit? Include any pap smears or colon screening.  No

## 2019-10-10 ENCOUNTER — TELEPHONE (OUTPATIENT)
Dept: FAMILY MEDICINE CLINIC | Age: 69
End: 2019-10-10

## 2019-10-11 NOTE — TELEPHONE ENCOUNTER
All patient identifiers verified, Mrs. Charanjit Madrigal advised that per Dr. Rohan Santamaria her x-rays of her hand were normal.
Left message for Mrs. Alejandro Harris to call FP
Nurse to advise that x-rays are normal.
Patient called egarding recent x-ray results. Please call patient back at your earliest convenience.
50

## 2019-11-05 ENCOUNTER — OFFICE VISIT (OUTPATIENT)
Dept: ORTHOPEDIC SURGERY | Age: 69
End: 2019-11-05

## 2019-11-05 VITALS
BODY MASS INDEX: 21.98 KG/M2 | OXYGEN SATURATION: 99 % | TEMPERATURE: 97.5 F | HEIGHT: 68 IN | DIASTOLIC BLOOD PRESSURE: 74 MMHG | WEIGHT: 145 LBS | HEART RATE: 77 BPM | SYSTOLIC BLOOD PRESSURE: 167 MMHG

## 2019-11-05 DIAGNOSIS — G20 PARKINSON DISEASE (HCC): ICD-10-CM

## 2019-11-05 DIAGNOSIS — M54.2 NECK PAIN: Primary | ICD-10-CM

## 2019-11-05 DIAGNOSIS — M48.062 SPINAL STENOSIS OF LUMBAR REGION WITH NEUROGENIC CLAUDICATION: ICD-10-CM

## 2019-11-05 DIAGNOSIS — R29.898 RIGHT ARM WEAKNESS: ICD-10-CM

## 2019-11-05 DIAGNOSIS — R26.89 BALANCE PROBLEM: ICD-10-CM

## 2019-11-05 DIAGNOSIS — M47.816 LUMBAR FACET ARTHROPATHY: ICD-10-CM

## 2019-11-05 DIAGNOSIS — R29.2 HYPERREFLEXIA: ICD-10-CM

## 2019-11-05 NOTE — LETTER
11/6/19 Patient: Yareli Adams YOB: 1950 [facility]: [facility] [address] [address] [place] [address] VIA In Basket Dear JEANNE Perez, Thank you for referring Ms. Cyndy Godwin to [facility] for evaluation. My notes for this consultation are attached. If you have questions, please do not hesitate to call me. I look forward to following your patient along with you. Sincerely, Tabatha Daniel MD

## 2019-11-05 NOTE — PATIENT INSTRUCTIONS
Low Back Arthritis: Exercises  Introduction  Here are some examples of typical rehabilitation exercises for your condition. Start each exercise slowly. Ease off the exercise if you start to have pain. Your doctor or physical therapist will tell you when you can start these exercises and which ones will work best for you. When you are not being active, find a comfortable position for rest. Some people are comfortable on the floor or a medium-firm bed with a small pillow under their head and another under their knees. Some people prefer to lie on their side with a pillow between their knees. Don't stay in one position for too long. Take short walks (10 to 20 minutes) every 2 to 3 hours. Avoid slopes, hills, and stairs until you feel better. Walk only distances you can manage without pain, especially leg pain. How to do the exercises  Pelvic tilt    1. Lie on your back with your knees bent. 2. \"Brace\" your stomach--tighten your muscles by pulling in and imagining your belly button moving toward your spine. 3. Press your lower back into the floor. You should feel your hips and pelvis rock back. 4. Hold for 6 seconds while breathing smoothly. 5. Relax and allow your pelvis and hips to rock forward. 6. Repeat 8 to 12 times. Back stretches    1. Get down on your hands and knees on the floor. 2. Relax your head and allow it to droop. Round your back up toward the ceiling until you feel a nice stretch in your upper, middle, and lower back. Hold this stretch for as long as it feels comfortable, or about 15 to 30 seconds. 3. Return to the starting position with a flat back while you are on your hands and knees. 4. Let your back sway by pressing your stomach toward the floor. Lift your buttocks toward the ceiling. 5. Hold this position for 15 to 30 seconds. 6. Repeat 2 to 4 times. Follow-up care is a key part of your treatment and safety.  Be sure to make and go to all appointments, and call your doctor if you are having problems. It's also a good idea to know your test results and keep a list of the medicines you take. Where can you learn more? Go to http://hugo-pee.info/. Enter U279 in the search box to learn more about \"Low Back Arthritis: Exercises. \"  Current as of: June 26, 2019  Content Version: 12.2  © 8473-0675 [employer]. Care instructions adapted under license by [employer] (which disclaims liability or warranty for this information). If you have questions about a medical condition or this instruction, always ask your healthcare professional. [place] [address] any warranty or liability for your use of this information.

## 2019-11-05 NOTE — PROGRESS NOTES
[facility]  [address], [facility] [address]  Phone: [phone]  Fax: [phone]    NEW PATIENT  Pt's YOB: 1950    ASSESSMENT   Diagnoses and all orders for this visit:    1. Neck pain  -     AMB POC XRAY, SPINE, CERVICAL; 2 OR 3  -     MRI CERV SPINE WO CONT; Future    2. Balance problem  -     AMB POC XRAY, SPINE, CERVICAL; 2 OR 3  -     MRI CERV SPINE WO CONT; Future    3. Right arm weakness  -     MRI CERV SPINE WO CONT; Future    4. Hyperreflexia  -     MRI CERV SPINE WO CONT; Future    5. Spinal stenosis of lumbar region with neurogenic claudication    6. Lumbar facet arthropathy    7. Parkinson disease [facility])         IMPRESSION AND PLAN:  Surya Vogel is a 81 y.o. female with history of lumbar pain. Pt complains of pain that starts in the left foot and radiates up the left leg into the left groin/buttock. She reports that she often feels wobbly on her feet. Pt has attended physical therapy at [facility] for strengthen and balance. She was prescribed Neurontin 100 mg 1 tab BID by Dr. Malaika Mendez, but she admits that she has ont yet started the medication. 1) Pt was given information on lumbar arthritis exercises. 2) She will start her previously prescribed Neurontin 100 mg 1 tab BID tapering up as directed. 3) A cervical MRI was ordered. She has a positive Ley's test, is hyperreflexic, has right arm weakness and balance issues, and has attended physical therapy. 4) Ms. Alexandro Valencia has a reminder for a \"due or due soon\" health maintenance. I have asked that she contact her primary care provider, JEANNE Ocampo, for follow-up on this health maintenance. 5)  demonstrated consistency with prescribing. Follow-up and Dispositions    · Return in about 3 weeks (around 11/26/2019) for Diagnostic Test follow up, Medication follow up.            HISTORY OF PRESENT ILLNESS:  Surya Vogel is a 81 y.o. female with history of lumbar pain. She presents to the office today as a new patient referred by Dr. Stephanie Patel. Pt complains of pain that starts in the left foot and radiates up the left leg into the left groin/buttock. She denies any tripping, falling, loss of bowel/bladder control, or weakness in the legs, but she admits to issues with balance. Pt reports that she often feels wobbly on her feet. She notes that she has attended physical therapy at [facility] for strengthen and balance. Pt admits to occasional numbness/weakness in the right hand. She reports that she receives knee injections at Dr. Thierry Trujillo office. Pt has a history of Parkinson's disease and is followed by Dr. Sean Rodriges. She was prescribed Neurontin 100 mg 1 tab BID by Dr. Jalen Luna, but she admits that she has ont yet started the medication. Pt is currently on carbidopa-levodopa 5 mg. Pt is not particularly interested in surgical intervention at this time. Pt at this time desires to proceed with medication evaluation and cervical MRI.     Pain Scale: 2/10     PCP: JEANNE Martinez    Past Medical History:   Diagnosis Date    Bladder perforation, intraoperative     during hysterectomy    Hyperlipidemia     Hypertension 1990    Osteopenia     Parkinson disease ([facility] [address]     Prediabetes         Social History     Socioeconomic History    Marital status:      Spouse name: Not on file    Number of children: Not on file    Years of education: Not on file    Highest education level: Not on file   Occupational History    Not on file   Social Needs    Financial resource strain: Not on file    Food insecurity:     Worry: Not on file     Inability: Not on file    Transportation needs:     Medical: Not on file     Non-medical: Not on file   Tobacco Use    Smoking status: Never Smoker    Smokeless tobacco: Never Used   Substance and Sexual Activity    Alcohol use: No    Drug use: No    Sexual activity: Yes     Partners: Male     Birth control/protection: None   Lifestyle    Physical activity:     Days per week: Not on file     Minutes per session: Not on file    Stress: Not on file   Relationships    Social connections:     Talks on phone: Not on file     Gets together: Not on file     Attends Orthodox service: Not on file     Active member of club or organization: Not on file     Attends meetings of clubs or organizations: Not on file     Relationship status: Not on file    Intimate partner violence:     Fear of current or ex partner: Not on file     Emotionally abused: Not on file     Physically abused: Not on file     Forced sexual activity: Not on file   Other Topics Concern     Service Not Asked    Blood Transfusions Not Asked    Caffeine Concern Not Asked    Occupational Exposure Not Asked   Jerzy Kamran Hazards Not Asked    Sleep Concern Not Asked    Stress Concern Not Asked    Weight Concern Not Asked    Special Diet Not Asked    Back Care Not Asked    Exercise Not Asked    Bike Helmet Not Asked   [address] Not Asked    Self-Exams Not Asked   Social History Narrative    Not on file       Current Outpatient Medications   Medication Sig Dispense Refill    amLODIPine (NORVASC) 10 mg tablet Take 1 Tab by mouth daily. 90 Tab 1    valsartan-hydroCHLOROthiazide (DIOVAN-HCT) 160-25 mg per tablet TAKE 1 TABLET BY MOUTH DAILY 90 Tab 0    rosuvastatin (CRESTOR) 20 mg tablet Take 1 Tab by mouth nightly. 90 Tab 0    carbidopa-levodopa (SINEMET)  mg per tablet TAKE 1 TABLET BY MOUTH THREE TIMES DAILY 270 Tab 6    rOPINIRole (REQUIP) 0.25 mg tablet TAKE 1 TABLET BY MOUTH THREE TIMES DAILY 270 Tab 6    calcium carbonate/vitamin D3 (CALCIUM + D PO) Take  by mouth.  cholecalciferol, VITAMIN D3, (VITAMIN D3) 5,000 unit tab tablet Take  by mouth daily.  cyanocobalamin, vitamin B-12, (VITAMIN B-12 PO) Take  by mouth.  gabapentin (NEURONTIN) 100 mg capsule Take 1 Cap by mouth two (2) times a day.  Max Daily Amount: 200 mg. 60 Cap 0       No Known Allergies    REVIEW OF SYSTEMS    Constitutional: Negative for fever, chills, or weight change. Respiratory: Negative for cough or shortness of breath. Cardiovascular: Negative for chest pain or palpitations. Gastrointestinal: Negative for acid reflux, change in bowel habits, or constipation. Genitourinary: Negative for dysuria and flank pain. Musculoskeletal: Positive for lumbar pain. Skin: Negative for rash. Neurological: Negative for headaches, dizziness, or numbness. Endo/Heme/Allergies: Negative for increased bruising. Psychiatric/Behavioral: Negative for difficulty with sleep. PHYSICAL EXAMINATION  Visit Vitals  /74 (BP 1 Location: Left arm, BP Patient Position: Sitting)   Pulse 77   Temp 97.5 °F (36.4 °C) (Oral)   Ht 5' 8.25\" (1.734 m)   Wt 145 lb (65.8 kg)   SpO2 99%   BMI 21.89 kg/m²       Constitutional: Awake, alert, and in no acute distress. HEENT: Normocephalic. Atraumatic. Oropharynx is moist and clear. PERRL. EOMI. Sclerae are nonicteric  Cardiovascular: Regular rate and rhythm  Lungs: Clear to auscultation bilaterally  Abdomen: Soft and nontender. Bowel sounds are present  Neurological: Hyperreflexic symmetrical DTRs in the upper extremities. Hyperreflexic symmetrical DTRs in the lower extremities. Sensation to light touch is intact. Positive Ley's sign bilaterally. Skin: warm, dry, and intact. Musculoskeletal: Very tight across the upper trapezius bilaterally. Decreased range of motion with side to side cervical flexion. No pain with extension, axial loading, or forward flexion. No pain with internal or external rotation of her hips. Negative straight leg raise bilaterally.       Biceps  Triceps Deltoids Wrist Ext Wrist Flex Hand Intrin   Right +4/5 +4/5 +4/5 +4/5 +4/5 +4/5   Left +4/5 +4/5 +4/5 +4/5 +4/5 +4/5      Hip Flex  Quads Hamstrings Ankle DF EHL Ankle PF   Right +4/5 +4/5 +4/5  4/5 +4/5 +4/5   Left +4/5 +4/5 +4/5 +4/5 +4/5 +4/5     IMAGING:    Cervical spine 2V x-rays from 11/05/2019 were personally reviewed with the patient and demonstrated:  Degenerative disc at C5-6. Diffuse degenerative changes. Lumbar spine MRI from 09/27/2019 was personally reviewed with the patient and demonstrated:  Results from [place] encounter on 09/27/19   MRI LUMB SPINE WO CONT    Narrative MR Lumbar spine without contrast    HISTORY: lumbar radiculopathy   Height 81year-old female complains of acute hot burning pain radiating down the  left leg for over one month. COMPARISON: None    Technique: Multi-sequence multiplanar T1, T2, STIR imaging with and without fat  saturation obtained centered on the lumbar spine. FINDINGS:     Alignment: Intact lordosis  Vertebral body height: Normal  Marrow signal: Unremarkable  Disc spaces: Mild degenerative disc disease L4-L5, L5-S1. Conus: Terminates at L2  Miscellaneous: Bilateral suspected renal peripelvic cysts. Axial imaging correlation:    Sagittal images T11-L1 demonstrate normal alignment, without central canal or  foraminal stenosis. L1-2: No significant disc pathology. Patent canal and foramina. Mild-moderate  facet arthropathy. L2-3: Mild asymmetric left-sided disc bulge and possible small left foraminal  annular fissure. Moderate bilateral facet arthropathy. Minimal central canal  stenosis. Mild left greater than right foraminal stenoses. Moderate facet  arthropathy. L3-4: Mild circumferential disc bulge and moderate facet arthropathy. Mild  spinal canal stenosis. Mild bilateral foraminal stenosis, right greater than  left. Moderate facet arthropathy. L4-5: Mild to moderate disc bulge and bilateral lateral recess annular fissures. Severe facet arthropathy. Moderate central canal stenosis. Moderate left and  mild right foraminal stenoses. L5-S1: Left lateral recess focal annular fissure with mild disc bulge. Moderate  facet arthropathy. Spinal canal patent. Mild foraminal stenoses. Imaged sacrum: Unremarkable. .        Impression IMPRESSION:  Degenerative changes and stenoses are most notable at levels L3-S1, with up to  moderate spinal canal and foraminal stenoses. See level by level details above. Thank you for enabling us to participate in the care of this patient. Written by Catherine Hines, as dictated by Sal Freed MD.  I, Dr. Sal Freed confirm that all documentation is accurate.

## 2019-11-12 ENCOUNTER — OFFICE VISIT (OUTPATIENT)
Dept: NEUROLOGY | Age: 69
End: 2019-11-12

## 2019-11-12 VITALS
HEIGHT: 68 IN | HEART RATE: 78 BPM | OXYGEN SATURATION: 99 % | TEMPERATURE: 99.4 F | RESPIRATION RATE: 18 BRPM | DIASTOLIC BLOOD PRESSURE: 70 MMHG | SYSTOLIC BLOOD PRESSURE: 110 MMHG | BODY MASS INDEX: 22.28 KG/M2 | WEIGHT: 147 LBS

## 2019-11-12 DIAGNOSIS — G20 PARKINSON DISEASE (HCC): Primary | ICD-10-CM

## 2019-11-12 NOTE — PROGRESS NOTES
Chief Complaint   Patient presents with    Tremors     follow up       Valentine Butler presents today for   Chief Complaint   Patient presents with    Tremors     follow up       Is someone accompanying this pt? no    Is the patient using any DME equipment during OV? no    Depression Screening:  3 most recent PHQ Screens 11/12/2019   Little interest or pleasure in doing things Not at all   Feeling down, depressed, irritable, or hopeless Not at all   Total Score PHQ 2 0       Learning Assessment:  Learning Assessment 6/12/2018   PRIMARY LEARNER Patient   HIGHEST LEVEL OF EDUCATION - PRIMARY LEARNER  4 YEARS OF COLLEGE   BARRIERS PRIMARY LEARNER NONE   CO-LEARNER CAREGIVER No   CO-LEARNER NAME -   PRIMARY LANGUAGE ENGLISH   LEARNER PREFERENCE PRIMARY READING   ANSWERED BY patient   RELATIONSHIP SELF       Abuse Screening:  Abuse Screening Questionnaire 3/19/2019   Do you ever feel afraid of your partner? N   Are you in a relationship with someone who physically or mentally threatens you? N   Is it safe for you to go home? Y       Fall Risk  Fall Risk Assessment, last 12 mths 11/12/2019   Able to walk? Yes   Fall in past 12 months? No         Coordination of Care:  1. Have you been to the ER, urgent care clinic since your last visit? Hospitalized since your last visit? no    2. Have you seen or consulted any other health care providers outside of the [address] [place] [address] since your last visit? Include any pap smears or colon screening.  no

## 2019-11-12 NOTE — PATIENT INSTRUCTIONS
Requip schedule Decrease to one tablet twice a day for 3 days then decrease to one tablet once daily for three day. Then stop. Maintain current dose of Sinemet 1 tab at 8 am, 12 noon, and 4 pm. 
 
 
 
Thank you for choosing [facility] and [facility] for your  
 
care. You may receive a survey about your visit. We appreciate you taking time  
 
to complete this survey as we use your feedback to improve our services. We  
 
realize we are not perfect, but we strive to provide excellent care.

## 2019-11-12 NOTE — PROGRESS NOTES
[facility]  [address], [address], [place], [address]  [address] [place]ish[place] Marcelo, [address]  Office:  [phone]  Fax: [phone]  Chief Complaint   Patient presents with    Tremors     follow up     This is a 73-year-old female who presents for follow-up Parkinson disease. Last seen in office in May by Dr. Ria Harrell. In the interim endorses she is doing quite well. She is maintained on Sinemet  mg 1 tablet 3 times daily. She takes her morning dose at 7 AM.  Her afternoon dose at 12 noon and her evening dose around 9 PM.  She occasionally will take it only 2 tablets a day. She feels like it affects her stomach and can make her tired. She is also recently started on gabapentin which causes drowsiness she notes. She is currently seeing Dr. Sivan Espinosa for leg pain. She endorses finishing physical therapy earlier this year in spring time. Denies hallucination or extra movements. She occasionally will have freezing when standing at the counter and then starting to transition to walk. Denies acting out her dreams. No other concerns at this time. Past Medical History:   Diagnosis Date    Bladder perforation, intraoperative     during hysterectomy    Hyperlipidemia     Hypertension     Osteopenia     Parkinson disease ([facility] [address]     Prediabetes        Past Surgical History:   Procedure Laterality Date    HX  SECTION      HX JEROD AND BSO      NE EXTRAC ERUPTED TOOTH/EXPOSED ROOT         Current Outpatient Medications   Medication Sig Dispense Refill    gabapentin (NEURONTIN) 100 mg capsule Take 1 Cap by mouth two (2) times a day. Max Daily Amount: 200 mg. 60 Cap 0    amLODIPine (NORVASC) 10 mg tablet Take 1 Tab by mouth daily. 90 Tab 1    valsartan-hydroCHLOROthiazide (DIOVAN-HCT) 160-25 mg per tablet TAKE 1 TABLET BY MOUTH DAILY 90 Tab 0    rosuvastatin (CRESTOR) 20 mg tablet Take 1 Tab by mouth nightly.  90 Tab 0    carbidopa-levodopa (SINEMET)  mg per tablet TAKE 1 TABLET BY MOUTH THREE TIMES DAILY (Patient taking differently: Indications: patient takes 1 in am and 1 in PM) 270 Tab 6    rOPINIRole (REQUIP) 0.25 mg tablet TAKE 1 TABLET BY MOUTH THREE TIMES DAILY 270 Tab 6    calcium carbonate/vitamin D3 (CALCIUM + D PO) Take  by mouth.  cholecalciferol, VITAMIN D3, (VITAMIN D3) 5,000 unit tab tablet Take  by mouth daily.  cyanocobalamin, vitamin B-12, (VITAMIN B-12 PO) Take  by mouth. No Known Allergies    Social History     Tobacco Use    Smoking status: Never Smoker    Smokeless tobacco: Never Used   Substance Use Topics    Alcohol use: No    Drug use: No       Family History   Problem Relation Age of Onset    Hypertension Mother     Hypertension Father     Hypertension Brother     Hypertension Brother     Kidney Disease Brother        Review of Systems  GENERAL: Denies fever or fatigue  CARDIAC: No CP or SOB  PULMONARY: No cough of SOB  MUSCULOSKELETAL: No new joint pain  NEURO: SEE HPI    Examination  Visit Vitals  /70 (BP 1 Location: Left arm, BP Patient Position: Sitting)   Pulse 78   Temp 99.4 °F (37.4 °C)   Resp 18   Ht 5' 8.25\" (1.734 m)   Wt 66.7 kg (147 lb)   SpO2 99%   BMI 22.19 kg/m²       This is a very pleasant 61-year-old female. She is alert and in no apparent distress. Full fund of knowledge. Speech is clear. Mild masking. Oriented x3, EOM's are full, PERRL. Very fine R hand rest tremor. Mild postural re emergent  tremor. Mild cogwheeling. She has some difficulty straightening the fingers on the right hand from some contrations of the forearm muscles. DTR's +2, gait symmetric, some decreased arm swing. No shuffling or festination taking 4 point turn. Impression/Plan  Zoey Pedersen is a 81 y.o. female whose history and physical are consistent with Parkinson's disease. Patient presents for follow-up Parkinson's disease. In the interim endorses some occasional freezing.   She does endorse at times taking Sinemet only twice a day. She feels it can affect her stomach and wonders if it caused drowsiness. Of mention she was recently started on gabapentin which she does endorse cause drowsiness. She is being followed by Dr. Evin Lara of ortho. She tells me of a history of restless leg syndrome of which she does not endorse positive symptoms. Denies crawling sensation. Denies the urge to move her legs. Endorses sleeping quite well. She has been on Requip 0.25 mg 3 times daily for this. We can certainly titrate off in a customary fashion. Will remain on Sinemet  mg. Encouraged her to stick as close to 8 AM, 12 noon, and 4 PM.  Encouraged her not to miss afternoon dose. Adequate supply of Sinemet. We discussed the big program and consideration for referral in the future. Follow-up in 2 months or sooner as needed. All questions addressed and patient is agreeable with plan of care. Diagnoses and all orders for this visit:    1. Parkinson disease ([facility] [address]      Total time: 30 min   Counseling / coordination time: 25 min   > 50% counseling / coordination?: Yes re: symptoms, management, safety, medication, answering questions, and plan of care. Signed By: Jaguar Ibanez NP    This note will not be viewable in [address] PLEASE NOTE:   Portions of this document may have been produced using voice recognition software. Unrecognized errors in transcription may be present.

## 2019-11-13 ENCOUNTER — HOSPITAL ENCOUNTER (OUTPATIENT)
Age: 69
Discharge: HOME | End: 2019-11-13
Attending: PHYSICAL MEDICINE & REHABILITATION
Payer: MEDICARE

## 2019-11-13 DIAGNOSIS — M54.2 NECK PAIN: ICD-10-CM

## 2019-11-13 DIAGNOSIS — R26.89 BALANCE PROBLEM: ICD-10-CM

## 2019-11-13 DIAGNOSIS — R29.898 RIGHT ARM WEAKNESS: ICD-10-CM

## 2019-11-13 DIAGNOSIS — R29.2 HYPERREFLEXIA: ICD-10-CM

## 2019-11-13 PROCEDURE — 72141 MRI NECK SPINE W/O DYE: CPT

## 2019-11-18 DIAGNOSIS — M48.061 SPINAL STENOSIS OF LUMBAR REGION, UNSPECIFIED WHETHER NEUROGENIC CLAUDICATION PRESENT: ICD-10-CM

## 2019-11-18 NOTE — TELEPHONE ENCOUNTER
This pharmacy faxed over request for the following prescriptions to be filled:    Medication requested :   Requested Prescriptions     Pending Prescriptions Disp Refills    gabapentin (NEURONTIN) 100 mg capsule 60 Cap 0     Sig: Take 1 Cap by mouth two (2) times a day. Max Daily Amount: 200 mg.      PCP: [place] or Print: [facility]   Mail order or Local pharmacy [address]    Scheduled appointment if not seen by current providers in office: LOV 9/11/2019 f/u 3/9/2020

## 2019-11-19 RX ORDER — GABAPENTIN 100 MG/1
100 CAPSULE ORAL 2 TIMES DAILY
Qty: 60 CAP | Refills: 0 | Status: SHIPPED | OUTPATIENT
Start: 2019-11-19 | End: 2020-01-31 | Stop reason: SDUPTHER

## 2019-12-02 DIAGNOSIS — I10 ESSENTIAL HYPERTENSION WITH GOAL BLOOD PRESSURE LESS THAN 140/90: Chronic | ICD-10-CM

## 2019-12-02 RX ORDER — VALSARTAN AND HYDROCHLOROTHIAZIDE 160; 25 MG/1; MG/1
TABLET ORAL
Qty: 90 TAB | Refills: 0 | Status: SHIPPED | OUTPATIENT
Start: 2019-12-02 | End: 2020-03-09 | Stop reason: SDUPTHER

## 2019-12-10 ENCOUNTER — OFFICE VISIT (OUTPATIENT)
Dept: ORTHOPEDIC SURGERY | Age: 69
End: 2019-12-10

## 2019-12-10 VITALS
BODY MASS INDEX: 22.13 KG/M2 | HEIGHT: 68 IN | RESPIRATION RATE: 16 BRPM | OXYGEN SATURATION: 99 % | SYSTOLIC BLOOD PRESSURE: 135 MMHG | DIASTOLIC BLOOD PRESSURE: 81 MMHG | WEIGHT: 146 LBS | HEART RATE: 68 BPM | TEMPERATURE: 97.4 F

## 2019-12-10 DIAGNOSIS — G20 PARKINSON DISEASE (HCC): ICD-10-CM

## 2019-12-10 DIAGNOSIS — M48.02 CERVICAL SPINAL STENOSIS: Primary | ICD-10-CM

## 2019-12-10 DIAGNOSIS — R29.898 RIGHT HAND WEAKNESS: ICD-10-CM

## 2019-12-10 RX ORDER — METHYLPREDNISOLONE 4 MG/1
TABLET ORAL
Qty: 1 DOSE PACK | Refills: 0 | Status: CANCELLED | OUTPATIENT
Start: 2019-12-10

## 2019-12-10 NOTE — PATIENT INSTRUCTIONS
Cervical Spinal Stenosis: Care Instructions  Your Care Instructions    Spinal stenosis is a narrowing of the canal that surrounds the spinal cord and nerve roots. Sometimes bone and other tissue grow into this canal and press on the nerves that branch out from the spinal cord. This can happen as a part of aging. When the narrowing happens in your neck, it's called cervical spinal stenosis. It often causes stiffness, pain, numbness, and weakness in the neck, shoulders, arms, hands, or legs. It can even cause problems with your balance, coordination, and bowel or bladder control. But some people have no symptoms. You may be able to get relief from the symptoms of spinal stenosis by taking pain medicine. Your doctor may suggest physical therapy and exercises to keep your spine strong and flexible. Some people try steroid shots to reduce swelling. If pain and numbness in your neck, arms, or legs are still so bad that you cannot do your normal activities, you may need surgery. Follow-up care is a key part of your treatment and safety. Be sure to make and go to all appointments, and call your doctor if you are having problems. It's also a good idea to know your test results and keep a list of the medicines you take. How can you care for yourself at home? · Ask your doctor if you can take an over-the-counter pain medicine, such as acetaminophen (Tylenol), ibuprofen (Advil, Motrin), or naproxen (Aleve). Be safe with medicines. Read and follow all instructions on the label. · Do not take two or more pain medicines at the same time unless the doctor told you to. Many pain medicines have acetaminophen, which is Tylenol. Too much acetaminophen (Tylenol) can be harmful. · Change positions often when you are standing or sitting. This may reduce pressure on the spinal cord and its nerves. · When you rest, use pillows or towel rolls to support your neck and head in a comfortable position.   · Follow your doctor's instructions about activity. He or she may tell you not to do sports or activities that could injure your neck. · Stretch your neck and shoulders as your doctor or physical therapist recommends. If your doctor says it is okay to do them, these exercises may help:  ? Neck stretches to the side. Keep your shoulders relaxed and slowly tilt your head straight over toward one shoulder. Hold for 15 seconds. Let the weight of your head stretch your muscles. Then do the same toward the other shoulder. ? Neck rotations. Keep your chin level and slowly turn your head to one side. Hold for 15 seconds. Then do the same to the other side. ? Shoulder rolls. Roll your shoulders up, then back, and then down in a smooth, circular motion. Repeat several times. When should you call for help? Call 911 anytime you think you may need emergency care. For example, call if:    · You are unable to move an arm or a leg at all.   [facility] your doctor now or seek immediate medical care if:    · You have new or worse symptoms in your arms, legs, belly, or buttocks. Symptoms may include:  ? Numbness or tingling. ? Weakness. ? Pain.     · You lose bladder or bowel control.    Watch closely for changes in your health, and be sure to contact your doctor if:    · You do not get better as expected. Where can you learn more? Go to http://hugo-pee.info/. Enter  in the search box to learn more about \"Cervical Spinal Stenosis: Care Instructions. \"  Current as of: June 26, 2019  Content Version: 12.2  © 6641-8087 [employer]wise, [employer]. Care instructions adapted under license by [employer] (which disclaims liability or warranty for this information). If you have questions about a medical condition or this instruction, always ask your healthcare professional. [address] any warranty or liability for your use of this information.

## 2019-12-10 NOTE — LETTER
12/14/19 Patient: Syeda Villanueva YOB: 1950 Date of Visit: 12/10/2019 [facility] [address] [address] [address] [place] [address] VIA In Basket Dear JEANNE Corcoran, Thank you for referring Ms. Zena Arguello to [facility] for evaluation. My notes for this consultation are attached. If you have questions, please do not hesitate to call me. I look forward to following your patient along with you. Sincerely, Londell Angelucci, MD

## 2019-12-10 NOTE — PROGRESS NOTES
[facility]  [address] [place], [facility] [address]  Phone: [phone]  Fax: [phone]    NEW PATIENT  Pt's YOB: 1950    ASSESSMENT   Diagnoses and all orders for this visit:    1. Cervical spinal stenosis    2. Right hand weakness  -     REFERRAL TO ORTHOPEDICS    3. Parkinson disease ([address]         IMPRESSION AND PLAN:  Alon Cabral is a 81 y.o. female with history of lumbar pain. Pt complains of pain that starts in the left foot and radiates up the left leg into the left groin/buttock. She also complains of some neck pain which is worse with washing dishes. Pt has a history of Parkinson's disease and is followed by Dr. Albert Amezcua. She was prescribed Neurontin 100 mg 1 tab BID by Dr. Mindy Harris,. She notes relief in symptoms since starting her Neurontin. Pt is currently on carbidopa-levodopa 5 mg. Pt at this time desires to proceed with referral to Dr. Ramy Mcdonald         1) Pt was given information on lumbar arthritis and cervical spinal stenosis exercises. 2) She will continue taking Neurontin 100 mg 1 tab BID    3) Pt was referred to Dr. Ramy Mcdonald for right hand weakness. 4) She received a Medrol Dosepak. 5) Ms. Perrin Cabot has a reminder for a \"due or due soon\" health maintenance. I have asked that she contact her primary care provider, JEANNE Peña, for follow-up on this health maintenance. 6)  demonstrated consistency with prescribing. 7) Cervical injections and other treatments discussed  Follow-up and Dispositions    · Return in about 3 months (around 3/10/2020) for follow up. HISTORY OF PRESENT ILLNESS:  Alon Cabral is a 81 y.o. female with history of lumbar pain. She presents to the office today for  Follow up visit. Pt complains of pain that starts in the left foot and radiates up the left leg into the left groin/buttock.  She also complains of some neck pain and right hand weakness which is worse with washing dishes. She is unable to close her hand completely and has some mild difficulty with her fingers. She has completed physical therapy in the past with no relief. Pt has a history of Parkinson's disease and is followed by Dr. Nickie Hall. She was prescribed Neurontin 100 mg 1 tab BID by Dr. Levon Cole,. She notes relief in symptoms since starting her Neurontin. Pt is currently on carbidopa-levodopa 5 mg. Pt at this time desires to proceed with referral to Dr. Osorio Marion.     Pain Scale: 1/10     PCP: JEANNE Jaramillo    Past Medical History:   Diagnosis Date    Bladder perforation, intraoperative     during hysterectomy    Hyperlipidemia     Hypertension 1990    Osteopenia     Parkinson disease [facility] [address]     Prediabetes         Social History     Socioeconomic History    Marital status:      Spouse name: Not on file    Number of children: Not on file    Years of education: Not on file    Highest education level: Not on file   Occupational History    Not on file   Social Needs    Financial resource strain: Not on file    Food insecurity:     Worry: Not on file     Inability: Not on file    Transportation needs:     Medical: Not on file     Non-medical: Not on file   Tobacco Use    Smoking status: Never Smoker    Smokeless tobacco: Never Used   Substance and Sexual Activity    Alcohol use: No    Drug use: No    Sexual activity: Yes     Partners: Male     Birth control/protection: None   Lifestyle    Physical activity:     Days per week: Not on file     Minutes per session: Not on file    Stress: Not on file   Relationships    Social connections:     Talks on phone: Not on file     Gets together: Not on file     Attends Tenriism service: Not on file     Active member of club or organization: Not on file     Attends meetings of clubs or organizations: Not on file     Relationship status: Not on file    Intimate partner violence:     Fear of current or ex partner: Not on file     Emotionally abused: Not on file     Physically abused: Not on file     Forced sexual activity: Not on file   Other Topics Concern     Service Not Asked    Blood Transfusions Not Asked    Caffeine Concern Not Asked    Occupational Exposure Not Asked    Hobby Hazards Not Asked    Sleep Concern Not Asked    Stress Concern Not Asked    Weight Concern Not Asked    Special Diet Not Asked    Back Care Not Asked    Exercise Not Asked    Bike Helmet Not Asked    Seat Belt Not Asked    Self-Exams Not Asked   Social History Narrative    Not on file       Current Outpatient Medications   Medication Sig Dispense Refill    valsartan-hydroCHLOROthiazide (DIOVAN-HCT) 160-25 mg per tablet TAKE 1 TABLET BY MOUTH DAILY 90 Tab 0    gabapentin (NEURONTIN) 100 mg capsule Take 1 Cap by mouth two (2) times a day. Max Daily Amount: 200 mg. 60 Cap 0    amLODIPine (NORVASC) 10 mg tablet Take 1 Tab by mouth daily. 90 Tab 1    rosuvastatin (CRESTOR) 20 mg tablet Take 1 Tab by mouth nightly. 90 Tab 0    carbidopa-levodopa (SINEMET)  mg per tablet TAKE 1 TABLET BY MOUTH THREE TIMES DAILY (Patient taking differently: Indications: patient takes 1 in am and 1 in PM) 270 Tab 6    calcium carbonate/vitamin D3 (CALCIUM + D PO) Take  by mouth.  cholecalciferol, VITAMIN D3, (VITAMIN D3) 5,000 unit tab tablet Take  by mouth daily.  cyanocobalamin, vitamin B-12, (VITAMIN B-12 PO) Take  by mouth.  rOPINIRole (REQUIP) 0.25 mg tablet TAKE 1 TABLET BY MOUTH THREE TIMES DAILY 270 Tab 6       No Known Allergies    REVIEW OF SYSTEMS    Constitutional: Negative for fever, chills, or weight change. Respiratory: Negative for cough or shortness of breath. Cardiovascular: Negative for chest pain or palpitations. Gastrointestinal: Negative for acid reflux, change in bowel habits, or constipation. Genitourinary: Negative for dysuria and flank pain. Musculoskeletal: Positive for lumbar pain and hand weakness. Skin: Negative for rash. Neurological: Negative for headaches, dizziness, or numbness. Endo/Heme/Allergies: Negative for increased bruising. Psychiatric/Behavioral: Negative for difficulty with sleep. PHYSICAL EXAMINATION  Visit Vitals  /81   Pulse 68   Temp 97.4 °F (36.3 °C) (Oral)   Resp 16   Ht 5' 8.25\" (1.734 m)   Wt 146 lb (66.2 kg)   SpO2 99%   BMI 22.04 kg/m²       Constitutional: Awake, alert, and in no acute distress. Neurological: Hyperreflexic symmetrical DTRs in the upper extremities. Hyperreflexic symmetrical DTRs in the lower extremities. Sensation to light touch is intact. Positive Ley's sign bilaterally; resting tremor present  Skin: warm, dry, and intact. Musculoskeletal: Very tight across the upper trapezius bilaterally. Decreased range of motion with side to side cervical flexion. No pain with extension, axial loading, or forward flexion. No pain with internal or external rotation of her hips. Negative straight leg raise bilaterally; pt unable to fully close her right hand      Biceps  Triceps Deltoids Wrist Ext Wrist Flex Hand Intrin   Right +4/5 +4/5 +4/5  4/5 -4/5  4/5   Left +4/5 +4/5 +4/5  4/5  4/5  4/5      Hip Flex  Quads Hamstrings Ankle DF EHL Ankle PF   Right +4/5 +4/5 +4/5  4/5 +4/5 +4/5   Left +4/5 +4/5 +4/5 +4/5 +4/5 +4/5     IMAGING:  Cervical spine MRI from 11/13/2019 were personally reviewed with the patient and demonstrated:  Results from [place] encounter on 11/13/19   MRI CERV SPINE WO CONT    Narrative MRI OF CERVICAL SPINE WITHOUT CONTRAST    CPT CODE: 48978    REASON FOR EXAM:  Neck pain, cervical spine  Balance problem. Right arm weakness, hyperreflexia. COMPARISON: No prior cervical spine imaging    TECHNIQUE: Sagittal and axial T2, sagittal T1, and sagittal inversion recovery  T2 weighted sequences were obtained. CONTRAST: NONE    FINDINGS:     Straightening of the cervical spine, no subluxations.   Vertebral body heights are normal.  No acute or chronic fractures. Mild loss of disc height in the lower cervical spine at C5/6. Some desiccative  changes at other levels in the cervical spine, not unusual for patient age. No marrow edema. No suspicious bone lesions  The included paraspinal and prevertebral soft tissues appear grossly normal  No Chiari I malformation. Cervical spinal cord is normal in signal and morphology. Correlation of axial and sagittal data through the disc levels:    C2/3: Disc and central canal: No significant disc pathology or central stenosis. Facets: No  arthropathy. Right Foramen:No stenosis. Left Foramen: No stenosis. C3/4:  Disc and central canal: Posterior endplate osteophytes. Superimposed  posterior disc bulge or protrusion into the right of midline on sagittal T2  image 9. Indentation of the ventral CSF space. Some ligamentum flavum thickening  posteriorly. AP diameter of the residual central canal 7 mm, moderate stenosis. Facets: Mild facet DJD             Right Foramen: Uncovertebral spurring with severe stenosis. Left Foramen: Mild stenosis. C4/5:  Disc and central canal: Central disc protrusion superimposed on small  posterior disc and osteophyte complex. Indentation of the ventral CSF space. No  contact with the ventral cord. The AP diameter of the central canal is 8 mm,  mild stenosis. Facets: No arthropathy. Right Foramen: No stenosis. Left Foramen: Mild stenosis. C5/6:  Disc and central canal: Tiny T2 hyperintense protrusion/annular tear  indenting the ventral CSF space. No central spinal canal stenosis. Facets: Mild right facet DJD             Right Foramen: Moderate stenosis. (Axial T2 image 13)             Left Foramen: No stenosis. C6/7:  Disc and central canal: Posterior disc protrusion. Indentation of the  ventral CSF space. No contact with the ventral cord.  AP diameter of the residual  central canal is 8 mm, mild stenosis. Facets: No arthropathy. Right Foramen: No stenosis. Left Foramen: No stenosis. C7/T1: Disc and central canal: No significant disc pathology or central  stenosis. Facets: Mild bilateral facet DJD             Right Foramen: Mild stenosis. Left Foramen: Mild stenosis. T1/T2: Small posterior disc protrusion, slight indentation of the ventral CSF  space but no central canal stenosis or significant foraminal narrowing. Impression IMPRESSION:    1. C3/C4 posterior disc and osteophyte complex with more focal right central  disc component. Combines with ligamentum flavum thickening to cause moderate  central canal stenosis. Severe right and mild left foraminal stenosis. 2. C4/5 and C6/7 posterior disc protrusions with mild central canal stenosis, no  cord compression. 3. C5/C6 mild disc protrusion without central canal stenosis. Moderate right  foraminal stenosis. 4. Other less pronounced degenerative changes as described in the body of the  report. Cervical spine 2V x-rays from 11/05/2019 were personally reviewed with the patient and demonstrated:  Degenerative disc at C5-6. Diffuse degenerative changes. Lumbar spine MRI from 09/27/2019 was personally reviewed with the patient and demonstrated:    HISTORY: lumbar radiculopathy   Height 81year-old female complains of acute hot burning pain radiating down the  left leg for over one month.     COMPARISON: None     Technique: Multi-sequence multiplanar T1, T2, STIR imaging with and without fat  saturation obtained centered on the lumbar spine.      FINDINGS:      Alignment: Intact lordosis  Vertebral body height: Normal  Marrow signal: Unremarkable  Disc spaces: Mild degenerative disc disease L4-L5, L5-S1.   Conus: Terminates at L2  Miscellaneous: Bilateral suspected renal peripelvic cysts.     Axial imaging correlation:     Sagittal images T11-L1 demonstrate normal alignment, without central canal or  foraminal stenosis.     L1-2: No significant disc pathology. Patent canal and foramina. Mild-moderate  facet arthropathy.     L2-3: Mild asymmetric left-sided disc bulge and possible small left foraminal  annular fissure. Moderate bilateral facet arthropathy. Minimal central canal  stenosis. Mild left greater than right foraminal stenoses. Moderate facet  arthropathy.     L3-4: Mild circumferential disc bulge and moderate facet arthropathy. Mild  spinal canal stenosis. Mild bilateral foraminal stenosis, right greater than  left. Moderate facet arthropathy.     L4-5: Mild to moderate disc bulge and bilateral lateral recess annular fissures. Severe facet arthropathy. Moderate central canal stenosis. Moderate left and  mild right foraminal stenoses.     L5-S1: Left lateral recess focal annular fissure with mild disc bulge. Moderate  facet arthropathy. Spinal canal patent. Mild foraminal stenoses.     Imaged sacrum: Unremarkable. .        IMPRESSION  IMPRESSION:  Degenerative changes and stenoses are most notable at levels L3-S1, with up to  moderate spinal canal and foraminal stenoses. See level by level details above.       Written by Mary Pat, as dictated by Tommy Lea MD.  I, Dr. Tommy Lea confirm that all documentation is accurate.

## 2020-01-09 ENCOUNTER — OFFICE VISIT (OUTPATIENT)
Dept: NEUROLOGY | Age: 70
End: 2020-01-09

## 2020-01-09 VITALS
TEMPERATURE: 96.5 F | HEIGHT: 68 IN | DIASTOLIC BLOOD PRESSURE: 64 MMHG | WEIGHT: 148 LBS | OXYGEN SATURATION: 99 % | SYSTOLIC BLOOD PRESSURE: 100 MMHG | HEART RATE: 75 BPM | BODY MASS INDEX: 22.43 KG/M2 | RESPIRATION RATE: 16 BRPM

## 2020-01-09 DIAGNOSIS — G20 PARKINSON DISEASE (HCC): ICD-10-CM

## 2020-01-09 RX ORDER — CARBIDOPA AND LEVODOPA 25; 100 MG/1; MG/1
TABLET ORAL
Qty: 270 TAB | Refills: 6 | Status: SHIPPED | OUTPATIENT
Start: 2020-01-09 | End: 2020-04-20 | Stop reason: SDUPTHER

## 2020-01-09 NOTE — PROGRESS NOTES
Chief Complaint   Patient presents with    Tremors     follow up       Yuko Yoder presents today for   Chief Complaint   Patient presents with    Tremors     follow up       Is someone accompanying this pt? no    Is the patient using any DME equipment during OV? no    Depression Screening:  3 most recent PHQ Screens 1/9/2020   Little interest or pleasure in doing things Not at all   Feeling down, depressed, irritable, or hopeless Not at all   Total Score PHQ 2 0       Learning Assessment:  Learning Assessment 6/12/2018   PRIMARY LEARNER Patient   HIGHEST LEVEL OF EDUCATION - PRIMARY LEARNER  4 YEARS OF COLLEGE   BARRIERS PRIMARY LEARNER NONE   CO-LEARNER CAREGIVER No   CO-LEARNER NAME -   PRIMARY LANGUAGE ENGLISH   LEARNER PREFERENCE PRIMARY READING   ANSWERED BY patient   RELATIONSHIP SELF       Abuse Screening:  Abuse Screening Questionnaire 3/19/2019   Do you ever feel afraid of your partner? N   Are you in a relationship with someone who physically or mentally threatens you? N   Is it safe for you to go home? Y       Fall Risk  Fall Risk Assessment, last 12 mths 1/9/2020   Able to walk? Yes   Fall in past 12 months? No         Coordination of Care:  1. Have you been to the ER, urgent care clinic since your last visit? Hospitalized since your last visit? no    2. Have you seen or consulted any other health care providers outside of the [address] [place] [address] since your last visit? Include any pap smears or colon screening.  No

## 2020-01-09 NOTE — PATIENT INSTRUCTIONS
Thank you for choosing [facility] for your     care. You may receive a survey about your visit. We appreciate you taking time     to complete this survey as we use your feedback to improve our services. We     realize we are not perfect, but we strive to provide excellent care.

## 2020-01-09 NOTE — PROGRESS NOTES
[facility]  [address], [place], [address]  [address] Kishan Marcelo, [address]  Office:  [phone]  Fax: [phone]  Chief Complaint   Patient presents with    Tremors     follow up     This is a 81year old female who presents for follow up PD. Last seen in office in November. In the interim endorses only taking Sinemet twice a day. Taking this around 7 am and at bedtime. Has some freezing and slow gait. Denies falls. Denies hallucinations. She has an upcoming appointment for her R hand stiffness with Dr. Raheem Terry. No other concerns at this time. Past Medical History:   Diagnosis Date    Bladder perforation, intraoperative     during hysterectomy    Hyperlipidemia     Hypertension     Osteopenia     Parkinson disease ([facility] [address]     Prediabetes        Past Surgical History:   Procedure Laterality Date    HX  SECTION      HX JEROD AND BSO      WA EXTRAC ERUPTED TOOTH/EXPOSED ROOT         Current Outpatient Medications   Medication Sig Dispense Refill    carbidopa-levodopa (SINEMET)  mg per tablet TAKE 1 TABLET at 8 am, 12 noon, and 4 pm 270 Tab 6    valsartan-hydroCHLOROthiazide (DIOVAN-HCT) 160-25 mg per tablet TAKE 1 TABLET BY MOUTH DAILY 90 Tab 0    gabapentin (NEURONTIN) 100 mg capsule Take 1 Cap by mouth two (2) times a day. Max Daily Amount: 200 mg. 60 Cap 0    amLODIPine (NORVASC) 10 mg tablet Take 1 Tab by mouth daily. 90 Tab 1    rosuvastatin (CRESTOR) 20 mg tablet Take 1 Tab by mouth nightly. 90 Tab 0    calcium carbonate/vitamin D3 (CALCIUM + D PO) Take  by mouth.  cholecalciferol, VITAMIN D3, (VITAMIN D3) 5,000 unit tab tablet Take  by mouth daily.  cyanocobalamin, vitamin B-12, (VITAMIN B-12 PO) Take  by mouth. No Known Allergies    Social History     Tobacco Use    Smoking status: Never Smoker    Smokeless tobacco: Never Used   Substance Use Topics    Alcohol use: No    Drug use:  No Family History   Problem Relation Age of Onset    Hypertension Mother     Hypertension Father     Hypertension Brother     Hypertension Brother     Kidney Disease Brother        Review of Systems  GENERAL: Denies fever or fatigue  CARDIAC: No CP or SOB  PULMONARY: No cough of SOB  MUSCULOSKELETAL: No new joint pain  NEURO: SEE HPI    Examination  Visit Vitals  /64 (BP 1 Location: Left arm, BP Patient Position: Sitting)   Pulse 75   Temp 96.5 °F (35.8 °C)   Resp 16   Ht 5' 8\" (1.727 m)   Wt 67.1 kg (148 lb)   SpO2 99%   BMI 22.50 kg/m²       This is a very pleasant 61-year-old female. She is alert and in no apparent distress. Full fund of knowledge. Speech is clear. Mild masking. Oriented x3, EOM's are full, PERRL. Very fine rest tremor today noted in L hand > R. Mild postural re emergent  tremor bilaterally. Mild cogwheeling. Stiffness to the R wrist. She has some difficulty straightening the fingers on the right hand from some contrations of the forearm muscles.  DTR's +2, gait symmetric, some decreased arm swing. No shuffling or festination taking 3 point turn. Impression/Plan  Mauro Whitman is a 81 y.o. female whose history and physical are consistent with PD. Undermedicated. Encouraged her to take the Sinemet three times a day. Patient verbalized understanding. Ensured adequate supply. She will follow up after she returns from [place] in April. All questions addressed and patient is agreeable with plan of care. Diagnoses and all orders for this visit:    1. Parkinson disease ([address]  -     carbidopa-levodopa (SINEMET)  mg per tablet; TAKE 1 TABLET at 8 am, 12 noon, and 4 pm      Total time: 30 min   Counseling / coordination time: 25 min   > 50% counseling / coordination?: Yes re: symptoms, management, medications, answering questions, dosing, and plan of care. Signed By: Elvira Escobar NP    This note will not be viewable in [address]       PLEASE NOTE: Portions of this document may have been produced using voice recognition software. Unrecognized errors in transcription may be present.

## 2020-01-16 ENCOUNTER — OFFICE VISIT (OUTPATIENT)
Dept: ORTHOPEDIC SURGERY | Age: 70
End: 2020-01-16

## 2020-01-16 VITALS
TEMPERATURE: 96.4 F | SYSTOLIC BLOOD PRESSURE: 117 MMHG | HEART RATE: 76 BPM | DIASTOLIC BLOOD PRESSURE: 69 MMHG | RESPIRATION RATE: 15 BRPM | HEIGHT: 68 IN | BODY MASS INDEX: 22.28 KG/M2 | WEIGHT: 147 LBS | OXYGEN SATURATION: 99 %

## 2020-01-16 DIAGNOSIS — M25.641 JOINT STIFFNESS OF HAND, RIGHT: ICD-10-CM

## 2020-01-16 DIAGNOSIS — G20 PARKINSON DISEASE (HCC): ICD-10-CM

## 2020-01-16 DIAGNOSIS — M62.40 CONTRACTURE OF TENDON: Primary | ICD-10-CM

## 2020-01-16 NOTE — PROGRESS NOTES
1. Have you been to the ER, urgent care clinic since your last visit? Hospitalized since your last visit? No    2. Have you seen or consulted any other health care providers outside of the [address] [place] [address] since your last visit? Include any pap smears or colon screening.  No

## 2020-01-16 NOTE — PROGRESS NOTES
Mauro Whitman is a 81 y.o. female right handed retiree. Worker's Compensation and legal considerations: not known. Vitals:    20 0900   BP: 117/69   Pulse: 76   Resp: 15   Temp: 96.4 °F (35.8 °C)   TempSrc: Oral   SpO2: 99%   Weight: 147 lb (66.7 kg)   Height: 5' 8\" (1.727 m)   PainSc:   0 - No pain   PainLoc: Hand           Chief Complaint   Patient presents with    Hand Pain     right hand pain         HPI: Patient comes in today with complaints of right middle finger worse than the ring finger contracture and inability to straighten out her fingers. She was told by her neurologist that this might be related to her Parkinson's disease however she was told by other physicians practitioners that this could be a Dupuytren's contracture. She reports is been ongoing since August.    Date of onset:  2019    Injury: No    Prior Treatment:  No    Numbness/ Tingling: No    ROS: Review of Systems - General ROS: negative  Respiratory ROS: no cough, shortness of breath, or wheezing  Cardiovascular ROS: no chest pain or dyspnea on exertion  Musculoskeletal ROS: positive for - joint stiffness and pain in hand - right  Neurological ROS: negative  Dermatological ROS: negative    Past Medical History:   Diagnosis Date    Bladder perforation, intraoperative     during hysterectomy    Hyperlipidemia     Hypertension     Osteopenia     Parkinson disease ([facility] [address]     Prediabetes        Past Surgical History:   Procedure Laterality Date    HX  SECTION      HX JEROD AND BSO      FL EXTRAC ERUPTED TOOTH/EXPOSED ROOT         Current Outpatient Medications   Medication Sig Dispense Refill    carbidopa-levodopa (SINEMET)  mg per tablet TAKE 1 TABLET at 8 am, 12 noon, and 4 pm 270 Tab 6    valsartan-hydroCHLOROthiazide (DIOVAN-HCT) 160-25 mg per tablet TAKE 1 TABLET BY MOUTH DAILY 90 Tab 0    gabapentin (NEURONTIN) 100 mg capsule Take 1 Cap by mouth two (2) times a day.  Max Daily Amount: 200 mg. 60 Cap 0    amLODIPine (NORVASC) 10 mg tablet Take 1 Tab by mouth daily. 90 Tab 1    rosuvastatin (CRESTOR) 20 mg tablet Take 1 Tab by mouth nightly. 90 Tab 0    calcium carbonate/vitamin D3 (CALCIUM + D PO) Take  by mouth.  cholecalciferol, VITAMIN D3, (VITAMIN D3) 5,000 unit tab tablet Take  by mouth daily.  cyanocobalamin, vitamin B-12, (VITAMIN B-12 PO) Take  by mouth. No Known Allergies      PE:     Right Hand: There is a tight band in the hand in line with the middle finger, however this does not appear to be a Dupuytren's pre-palmar cord. This appears to be an active tendon that moves with flexion and extension of the digit. Imaging:     Previous plain films of the right wrist show Mild to moderate degenerative changes throughout. ICD-10-CM ICD-9-CM    1. 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 Joint stiffness of hand, right M25.641 719.54    3. Parkinson disease ([address] G20 332.0        Plan: This most likely is a contracted tendon possibly related to her Parkinson's disease    At this point I would like to get her into occupational therapy to work on range of motion exercises of her fingers. Follow-up in 3 months for reevaluation.     Plan was reviewed with patient, who verbalized agreement and understanding of the plan

## 2020-01-21 ENCOUNTER — TELEPHONE (OUTPATIENT)
Dept: ORTHOPEDIC SURGERY | Age: 70
End: 2020-01-21

## 2020-01-21 NOTE — TELEPHONE ENCOUNTER
Returned call to patient, Reached voicemail identified as \"Flor Juárezjeromy Angela", left message, identified myself/facility/callback number, requested return call to facility.

## 2020-01-21 NOTE — TELEPHONE ENCOUNTER
I just searched the FDA website for any recalls on Gabapentin and could not find any. Is it the pharmacy who said this? Or does she mean it is now a controlled substance?  Looks like another prescriber gave it to her last

## 2020-01-21 NOTE — TELEPHONE ENCOUNTER
Patient called as she just received on her computer that Gabapentin is being recalled as it can cause lung problems or death. Patient needs to know if she should stop the Gabapentin? Please have Dr. Ya check on the recall and what is patient to due now:?   Patient can be reached at [phone]

## 2020-01-22 NOTE — TELEPHONE ENCOUNTER
01/22/2020 @ 10:55am Patient called Nunu Garcia out office. Patient heard about recall by info commercial on TV. Patient will check with her pharmacy also.  Patient would still like for Nara Robins to call her back when Nara Robins comes back to the office or the NP

## 2020-01-28 ENCOUNTER — HOSPITAL ENCOUNTER (OUTPATIENT)
Dept: PHYSICAL THERAPY | Age: 70
Discharge: HOME | End: 2020-01-28
Payer: MEDICARE

## 2020-01-28 PROCEDURE — 97165 OT EVAL LOW COMPLEX 30 MIN: CPT

## 2020-01-28 PROCEDURE — 97535 SELF CARE MNGMENT TRAINING: CPT

## 2020-01-28 PROCEDURE — 97110 THERAPEUTIC EXERCISES: CPT

## 2020-01-28 NOTE — PROGRESS NOTES
Hand Therapy Evaluation and Daily Note    Patient Name: Shivani Quinteros  Date:2020  : 1950  Age: 81 y.o.y/o  [x]  Patient  Verified  Payor: AARP MEDICARE COMPLETE / Plan: [facility] MEDICARE COMPLETE / Product Type: [employer] Care Medicare /    Referring Provider: DO Erin Clements MD Visit:  2020  Onset Date:  2019  Surgical Date: na  Surgical Procedure: na    In time:11:35 AM  Out time:12:35 PM  Total Treatment Time (min): 60  Total Timed Codes (min): 30  1:1 Treatment Time (MC only): 60   Visit #: 1 of 8    Treatment Area: Hand pain, right [M79.641]  Stiffness of right hand, not elsewhere classified [M25.641]  Contracture of muscle, unspecified site [M62.40]    Precautions:    Hand Dominance: right handed   Hand Involved: right    Total Evaluation Time:  30    History of Present Condition:  Patient is a right hand dominant 81 y.o. female with a chief complaint  of right hand pain and swelling. Pt reports she was using her right hand to lift the recliner when she noticed pain beginning in 2019. She reports pain with steering on the dorsum hand where there is edema noted. She reports difficulty with using the curling iron, buttoning and FM tasks. Pain Rating:   Current: (0-no pain 10-debilitating pain) no   At best: (0-no pain 10-debilitating pain) no  At worst: (0-no pain 10-debilitating pain) mild  Location: right hand  Type:  no   Better with: Worse with:     Medications/Allergies/Past Medical History:  See chart; reviewed with patient. Osteopenia, arthritis, HTN, Parkinson's Disease    Diagnostic Tests: Imaging:      Previous plain films of the right wrist show Mild to moderate degenerative changes throughout.     Prior Level of Function: opening containers, buttoning, curling her hair    Current Level of Function:  Reduced ADL efficiency, decreased AROM, decreased strength, edema    Social History: Pt is     Occupation/Job Requirements: Retired    Observation: edema noted over dorsum hand  Scar/incision:   na  Location:  Right hand     Palpation:  palpable mass over dorsum right hand, no pain. Range of Motion:  Hand ROM   2nd 3rd  A/PROM 4th 5th Thumb   MP  16 - 85/0 12 - 80/0     PIP  16  92/0 8 - 96/0     DIP  0 - 80/0 0 - 54/0     PABD        RABD        RAPHAEL  225/257 210/230       Strength:   Measurements: Taken with [employer] Dynamometer, in Lbs   Level 2 1/28/2020    Date Date Date Date Date Date   Right 22.3         Left 35.3         Deficit          Change                 Sensation:    intact    Edema: GIRTH CHART measured in cm  Date: 1/28/2020       Side Right/Left    DPC circum. 19.3/19.0    Wrist Crease 16.6/16.0    FA      Elbow         Special Tests:   Nine-Hole Peg Test:  Left= _30____seconds  Right=__29___seconds    ADLs  Feeding:        []MaxA   []ModA   []Monika   [] CGA   []SBA   []Philippe   []Independent  UE Dressing:       []MaxA   []ModA   []Monika   [] CGA   []SBA   []Philippe   []Independent  LE Dressing:       []MaxA   []ModA   []Monika   [] CGA   []SBA   []Philippe   []Independent  Grooming:       []MaxA   []ModA   []Monika   [] CGA   []SBA   []Philippe   []Independent  Toileting:       []MaxA   []ModA   []Monika   [] CGA   []SBA   []Philippe   []Independent  Bathing:       []MaxA   []ModA   []Monika   [] CGA   []SBA   []Philippe   []Independent  Light Meal Prep:    []MaxA   []ModA   []Monika   [] CGA   []SBA   []Philippe   []Independent  Household/Other: []MaxA   []ModA   []Monika   [] CGA   []SBA   []Philippe   []Independent  Adaptive Equip:     []MaxA   []ModA   []Monika   [] CGA   []SBA   []Philippe   []Independent  Driving:       []MaxA   []ModA   []Monika   [] CGA   []SBA   []Philippe   []Independent      Todays Treatment:  Patient received an initial evaluation today followed by education as to diagnosis, precautions and treatment plan. Patient was provided with a basic home exercise program including passive digit extension stretching.       OBJECTIVE    15 min Therapeutic Exercise:  [x] See flow sheet :   Rationale: increase ROM to improve the patients ability to extend right hand middle and ring fingers without increased pain. 15 min Self Care/Home Management: activity modifications, treatment plan, joint protection   Rationale: education  to improve the patients ability to use right hand for functional tasks. With   [] TE   [] TA   [] neuro   [] other: Patient Education: [x] Review HEP    [] Progressed/Changed HEP based on:   [] positioning   [] body mechanics   [] transfers   [] heat/ice application   [] Splint wear/care   [] Sensory re-education   [] scar management      [] other:      Pain Level (0-10 scale) post treatment: 0/10    Patient will continue to benefit from skilled OT services to modify and progress therapeutic interventions, address ROM deficits, address strength deficits and analyze and address soft tissue restrictions to attain goals. Assessment: Pt presents to skilled OT today with palpable mass noted over dorsum hand which is not pain to palpate. Pt reports she was using her right hand to lift the recliner when she noticed pain beginning in August 2019. She reports pain with steering on the dorsum hand where there is edema noted. She reports difficulty with using the curling iron, buttoning and FM tasks. Her right hand middle and ring fingers are resting in a flexed position however she is passively extended to 0 degrees. She is able to make a composite fist with the right hand. There is tightness noted in the right hand palm and she has reduced  strength in her right dominant hand compared to her left hand. Short Term Goals: To be accomplished in 2  weeks:  1. Patient will be compliant with initial home exercise program to take an active role in their rehabilitation process. Status at [facility]: pt provided and instructed in initial HEP  2.  Patient will demonstrate a good understanding of their condition and strategies for self-management. Status at Eval: pt educated on joint protection, treatment plan, activity modifications. Long Term Goals: To be accomplished in 4 weeks:   1. Pt will be compliant with passive stretching HEP in order to better perform FM tasks of choice. Status at eval: pt provided and instructed in passive stretching   2. Patient will improve  strength to 25# in order to increase ability to open containers. Status at eval: 22#    Frequency / Duration: Patient to be seen 1-2 times per week for 4 weeks:    Patient/ Caregiver education and instruction: Diagnosis, prognosis, self care, activity modification, brace/ splint application and exercises    Functional Status Measure:  Patient's:67  FOTO Benchmark: 68  Expected Change: 1  FOTO score based on 0 - 100 point scale, with 100 being no impairment. These scores are determined by patient reported functional assessments compared against national benchmarked data.     Certification Period: 1/28/2020 - 2/24/2020    [place] Chamber, MW,9/58/6088 11:37 AM

## 2020-01-28 NOTE — PROGRESS NOTES
[facility]  [address] [facility] [facility] [address]  [place], [address]  [phone] fax    Plan of Care/Statement of Necessity for Occupational Therapy Services    Patient name: Brenda Mcknight Start of Care: 2020   Referral source: Mirna Storey DO : 1950    Medical Diagnosis: Hand pain, right [M79.641]  Stiffness of right hand, not elsewhere classified [M25.641]  Contracture of muscle, unspecified site [M62.40]  Payor: Daniele Stacy / Plan: [address] / Product Type: [employer] Medicare /  Onset Date:2019    Treatment Diagnosis: right hand pain   Prior Hospitalization: see medical history Provider#: [id number]   Medications: Verified on Patient summary List    Comorbidities:  Osteopenia, arthritis, HTN, Parkinson's Disease   Prior Level of Function: opening containers, buttoning, curling her hair       The Plan of Care and following information is based on the information from the initial evaluation. Assessment/ key information: Patient is a right hand dominant 81 y.o. female with a chief complaint  of right hand pain and swelling. Pt presents to skilled OT today with palpable mass noted over dorsum hand which is not pain to palpate. Pt reports she was using her right hand to lift the recliner when she noticed pain beginning in 2019. She reports pain with steering on the dorsum hand where there is edema noted. She reports difficulty with using the curling iron, buttoning and FM tasks. Her right hand middle and ring fingers are resting in a flexed position however she is passively extended to 0 degrees. She is able to make a composite fist with the right hand. There is tightness noted in the right hand palm and she has reduced  strength in her right dominant hand compared to her left hand. Patient received an initial evaluation today followed by education as to diagnosis, precautions and treatment plan.   Patient was provided with a basic home exercise program including passive digit extension stretching. Pt was educated on joint protection. Skilled OT services are necessary to address deficits to improve ADL/IADL efficiency. Evaluation Complexity: History LOW Complexity : Brief history review  Examination LOW Complexity : 1-3 performance deficits relating to physical, cognitive , or psychosocial skils that result in activity limitations and / or participation restrictions  Clinical Decision Making MEDIUM Complexity : Patient may present with comorbidities that affect occupational performnce. Miniml to moderate modification of tasks or assistance (eg, physical or verbal ) with assesment(s) is necessary to enable patient to complete evaluation   Overall Complexity Rating: LOW   Problem List: Pain effecting function, Decreased range of motion, Decreased strength, Edema effecting function, Decreased coordination/prehension, Decreased ADL/functional abilities , Decreased activity tolerance and Decreased flexibility/joint mobility   Treatment Plan may include any combination of the following: Therapeutic exercise, Therapeutic activities, Neuromuscular re-education, Physical agent/modality, Manual therapy, Splinting/orthoses, Patient education and ADLs/IADLs  Patient / Family readiness to learn indicated by: asking questions, trying to perform skills and interest  Persons(s) to be included in education:   patient (P)  Barriers to Learning/Limitations: None  Patient Goal (s): loosen fingers and rid swelling on top of hand.   Patient Self Reported Health Status: good  Rehabilitation Potential: good  Short Term Goals: To be accomplished in 2  weeks:  1. Patient will be compliant with initial home exercise program to take an active role in their rehabilitation process. 2. Patient will demonstrate a good understanding of their condition and strategies for self-management. Long Term Goals:  To be accomplished in 4 weeks: 1. Pt will be compliant with passive stretching HEP in order to better perform FM tasks of choice. 2. Patient will improve  strength to 25# in order to increase ability to open containers.   Frequency / Duration: Patient to be seen 1-2 times per week for 4 weeks:  Patient/ Caregiver education and instruction: Diagnosis, prognosis, self care, activity modification, brace/ splint application and exercises  [x]  Plan of care has been reviewed with HENRIQUEZ    Certification Period: 1/28/2020 - 2/24/2020    Bharti Castaneda OT 1/28/2020 1:16 PM  ________________________________________________________________________    I certify that the above Therapy Services are being furnished while the patient is under my care. I agree with the treatment plan and certify that this therapy is necessary.     Physician's Signature:____________Date:_________TIME:________    ** Signature, Date and Time must be completed for valid certification **    Please sign and return to [facility] [address] [place] [address] & [facility]  [address] [place] [address], [facility] [address]  [phone] fax

## 2020-01-30 ENCOUNTER — HOSPITAL ENCOUNTER (OUTPATIENT)
Dept: PHYSICAL THERAPY | Age: 70
Discharge: HOME | End: 2020-01-30
Payer: MEDICARE

## 2020-01-30 PROCEDURE — 97140 MANUAL THERAPY 1/> REGIONS: CPT

## 2020-01-30 PROCEDURE — 97035 APP MDLTY 1+ULTRASOUND EA 15: CPT

## 2020-01-30 PROCEDURE — 97110 THERAPEUTIC EXERCISES: CPT

## 2020-01-31 DIAGNOSIS — M48.061 SPINAL STENOSIS OF LUMBAR REGION, UNSPECIFIED WHETHER NEUROGENIC CLAUDICATION PRESENT: ICD-10-CM

## 2020-01-31 RX ORDER — GABAPENTIN 100 MG/1
100 CAPSULE ORAL 2 TIMES DAILY
Qty: 60 CAP | Refills: 0 | Status: SHIPPED | OUTPATIENT
Start: 2020-01-31 | End: 2020-03-09 | Stop reason: DRUGHIGH

## 2020-01-31 NOTE — TELEPHONE ENCOUNTER
This patient contacted office for the following prescriptions to be filled:    Medication requested :   Requested Prescriptions     Pending Prescriptions Disp Refills    gabapentin (NEURONTIN) 100 mg capsule 60 Cap 0     Sig: Take 1 Cap by mouth two (2) times a day. Max Daily Amount: 200 mg.      PCP: [address] [place] [address] Drive or Print:  [facility]   Mail order or Local pharmacy [address]    Scheduled appointment if not seen by current providers in office:  LOV 10/7/2019 f/u 3/9/2020

## 2020-02-04 ENCOUNTER — HOSPITAL ENCOUNTER (OUTPATIENT)
Dept: PHYSICAL THERAPY | Age: 70
Discharge: HOME | End: 2020-02-04
Payer: MEDICARE

## 2020-02-04 PROCEDURE — 97140 MANUAL THERAPY 1/> REGIONS: CPT

## 2020-02-04 PROCEDURE — 97035 APP MDLTY 1+ULTRASOUND EA 15: CPT

## 2020-02-04 NOTE — PROGRESS NOTES
OT DAILY TREATMENT NOTE 10-18    Patient Name: Grey Ascencio  Date:2020  : 1950  [x]  Patient  Verified  Payor: AARP MEDICARE COMPLETE / Plan: [facility] MEDICARE COMPLETE / Product Type: Managed Care Medicare /    In time:1200  Out time:1257  Total Treatment Time (min): 62  Visit #: 3 of 8    Medicare/BCBS Only   Total Timed Codes (min):  53 1:1 Treatment Time:  48     Treatment Area: Hand pain, right [M79.641]  Stiffness of right hand, not elsewhere classified [M25.641]  Contracture of muscle, unspecified site [M62.40]    SUBJECTIVE  Pain Level (0-10 scale): 0  Any medication changes, allergies to medications, adverse drug reactions, diagnosis change, or new procedure performed?: [x] No    [] Yes (see summary sheet for update)  Subjective functional status/changes:   [x] No changes reported      OBJECTIVE    Modality rationale: increase tissue extensibility to improve the patients ability to soften tissue to improve mobility   Min Type Additional Details    [] Estim:  []Unatt       []IFC  []Premod                        []Other:  []w/ice   []w/heat  Position:  Location:    [] Estim: []Att    []TENS instruct  []NMES                    []Other:  []w/US   []w/ice   []w/heat  Position:  Location:    []  Traction: [] Cervical       []Lumbar                       [] Prone          []Supine                       []Intermittent   []Continuous Lbs:  [] before manual  [] after manual   2x8 [x]  Ultrasound: [x]Continuous   [] Pulsed                           []1MHz   [x]3MHz W/cm2:1.0  Location: dorsal and volar surface of the right hand    []  Iontophoresis with dexamethasone         Location: [] Take home patch   [] In clinic   15 []  Ice     []  heat  []  Ice massage  []  Laser   []  Anodyne Position:  Location:    []  Laser with stim  []  Other:  Position:  Location:    []  Vasopneumatic Device Pressure:       [] lo [] med [] hi   Temperature: [] lo [] med [] hi     [x] Skin assessment post-treatment: [x]intact []redness- no adverse reaction    []redness - adverse reaction:     28 min Manual Therapy:  IASTM with Graston tool # 4,6, using sweeping and fanning techniques, jt mobs with PROM   Rationale: increase tissue extensibility and decrease edema  to right hand    With   [] TE   [] TA   [] neuro   [] other: Patient Education: [x] Review HEP    [] Progressed/Changed HEP based on:   [] positioning   [] body mechanics   [] transfers   [] heat/ice application   [] Splint wear/care   [] Sensory re-education   [] scar management      [] other:            Other Objective/Functional Measures: NA     Pain Level (0-10 scale) post treatment: 0    ASSESSMENT/Changes in Function: intrinsic tightness limiting ROM    Patient will continue to benefit from skilled OT services to address ROM deficits, address strength deficits and analyze and address soft tissue restrictions to attain remaining goals. [x]  See Plan of Care  []  See progress note/recertification  []  See Discharge Summary         Progress towards goals / Updated goals:  Short Term Goals: To be accomplished in 2  weeks:  1. Patient will be compliant with initial home exercise program to take an active role in their rehabilitation process. Status as last note/recert:  Reports compliance with assistance from spouse. 1/30/20  2. Patient will demonstrate a good understanding of their condition and strategies for self-management. Long Term Goals: To be accomplished in 4 weeks:              1. Pt will be compliant with passive stretching HEP in order to better perform FM tasks of choice.   2. Patient will improve  strength to 25# in order to increase ability to open containers.     PLAN  []  Upgrade activities as tolerated     [x]  Continue plan of care  []  Update interventions per flow sheet       []  Discharge due to:_  []  Other:_      Abran Ponce OT 2/4/2020  1:04 PM    Future Appointments   Date Time Provider Maritza Elam   2/6/2020 10:00 AM Velashleya Pal, OT [facility]   2/11/2020 10:00 AM Mac Christie, OT [facility]   2/13/2020 10:30 AM Mac Christie, OT [facility]   2/18/2020 10:00 AM Mac Christie, OT [facility]   2/20/2020 10:00 AM Mac Christie, OT [facility]   3/9/2020  8:30 AM JEANNE Hernandez [facility]   3/10/2020  9:30 AM Geraldine Mike  [address]   4/9/2020  8:30 AM Marcos Golden  [address]   4/16/2020  9:10 AM DO Elodia Tuttle [place] 71

## 2020-02-06 ENCOUNTER — HOSPITAL ENCOUNTER (OUTPATIENT)
Dept: PHYSICAL THERAPY | Age: 70
Discharge: HOME | End: 2020-02-06
Payer: MEDICARE

## 2020-02-06 PROCEDURE — 97140 MANUAL THERAPY 1/> REGIONS: CPT

## 2020-02-06 PROCEDURE — 97035 APP MDLTY 1+ULTRASOUND EA 15: CPT

## 2020-02-06 NOTE — PROGRESS NOTES
OT DAILY TREATMENT NOTE 10-18    Patient Name: Liz Buenrostro  Date:2020  : 1950  [x]  Patient  Verified  Payor: AARP MEDICARE COMPLETE / Plan: [address] / Product Type: Managed Care Medicare /    In time:1200  Out time:1250  Total Treatment Time (min): 50  Visit #: 4 of 8    Medicare/BCBS Only   Total Timed Codes (min):  40 1:1 Treatment Time:  50     Treatment Area: Hand pain, right [M79.641]  Stiffness of right hand, not elsewhere classified [M25.641]  Contracture of muscle, unspecified site [M62.40]    SUBJECTIVE  Pain Level (0-10 scale): 0  Any medication changes, allergies to medications, adverse drug reactions, diagnosis change, or new procedure performed?: [x] No    [] Yes (see summary sheet for update)  Subjective functional status/changes:   [x] No changes reported      OBJECTIVE    Modality rationale: increase tissue extensibility to improve the patients ability to soften tissue to improve mobility   Min Type Additional Details    [] Estim:  []Unatt       []IFC  []Premod                        []Other:  []w/ice   []w/heat  Position:  Location:    [] Estim: []Att    []TENS instruct  []NMES                    []Other:  []w/US   []w/ice   []w/heat  Position:  Location:    []  Traction: [] Cervical       []Lumbar                       [] Prone          []Supine                       []Intermittent   []Continuous Lbs:  [] before manual  [] after manual   2x8 [x]  Ultrasound: [x]Continuous   [] Pulsed                           []1MHz   [x]3MHz W/cm2:1.0  Location: dorsal and volar surface of the right hand    []  Iontophoresis with dexamethasone         Location: [] Take home patch   [] In clinic   10 []  Ice     []  heat  []  Ice massage  []  Laser   []  Anodyne Position:  Location:    []  Laser with stim  []  Other:  Position:  Location:    []  Vasopneumatic Device Pressure:       [] lo [] med [] hi   Temperature: [] lo [] med [] hi     [x] Skin assessment post-treatment: [x]intact []redness- no adverse reaction    []redness - adverse reaction:     25 min Manual Therapy:  IASTM with Graston tool # 4,6, using sweeping and fanning techniques, jt mobs with PROM, measurements   Rationale: increase tissue extensibility and decrease edema  to right hand    With   [] TE   [] TA   [] neuro   [] other: Patient Education: [x] Review HEP    [] Progressed/Changed HEP based on:   [] positioning   [] body mechanics   [] transfers   [] heat/ice application   [] Splint wear/care   [] Sensory re-education   [] scar management      [] other:            Other Objective/Functional Measures:Range of Motion:    Hand ROM- 1/28/20    2nd 3rd  A/PROM 4th 5th Thumb   MP   16 - 85/0 12 - 80/0       PIP   16  92/0 8 - 96/0       DIP   0 - 80/0 0 - 54/0       PABD             RABD             RAPHAEL   225/257 210/230        Hand AROM-2/6/20   2nd 3rd 4th 5th   MP 2/72 12/78 10/78 0/90   PIP 0/95 18/92 0/95 0/88   DIP 0/52 0/68 0/58 0/72   RAPHAEL  206 221        Strength:   Measurements: Taken with Perry Dynamometer, in Lbs   Level 2 1/28/2020    Date Date Date Date Date Date   Right 22.3               Left 35.3               Deficit                 Change                         Sensation:    intact     Edema: GIRTH CHART measured in cm  Date: 1/28/2020        Side Right/Left     DPC circum. 19.3/19.0     Wrist Crease 16.6/16.0     FA        Elbow              Pain Level (0-10 scale) post treatment: 0    ASSESSMENT/Changes in Function:no significant hang In AROM of the digits whoever, it appears that patient's soft tissue pliability has improved. Patient will continue to benefit from skilled OT services to address ROM deficits, address strength deficits and analyze and address soft tissue restrictions to attain remaining goals. [x]  See Plan of Care  []  See progress note/recertification  []  See Discharge Summary         Progress towards goals / Updated goals:  Short Term Goals:  To be accomplished in 2  weeks:  1. Patient will be compliant with initial home exercise program to take an active role in their rehabilitation process. Status as last note/recert:  Reports compliance with assistance from spouse. 1/30/20  2. Patient will demonstrate a good understanding of their condition and strategies for self-management. Status as last note/recert: patient reports understanding of changes and is compliant with HEP 2/6/20    Long Term Goals: To be accomplished in 4 weeks:              1. Pt will be compliant with passive stretching HEP in order to better perform FM tasks of choice.   2. Patient will improve  strength to 25# in order to increase ability to open containers.     PLAN  []  Upgrade activities as tolerated     [x]  Continue plan of care  []  Update interventions per flow sheet       []  Discharge due to:_  []  Other:_      Yaquelin Shin OT 2/6/2020  1:04 PM    Future Appointments   Date Time Provider Maritza Elam   2/11/2020 10:00 AM Mortimer Hug, OT [facility]   2/13/2020 10:30 AM Mortimer Hug, OT [facility]   2/18/2020 10:00 AM Mortimer Hug, OT [facility]   2/20/2020 10:00 AM Mortimer Hug, OT [facility]   3/9/2020  8:30 AM [facility]   3/10/2020  9:30 AM Tess Newberry  [address]   4/9/2020  8:30 AM Darin Kim  [address]   4/16/2020  9:10 AM DO Elodia [place] 71

## 2020-02-11 ENCOUNTER — HOSPITAL ENCOUNTER (OUTPATIENT)
Dept: PHYSICAL THERAPY | Age: 70
Discharge: HOME | End: 2020-02-11
Payer: MEDICARE

## 2020-02-11 PROCEDURE — 97035 APP MDLTY 1+ULTRASOUND EA 15: CPT

## 2020-02-11 PROCEDURE — 97140 MANUAL THERAPY 1/> REGIONS: CPT

## 2020-02-11 NOTE — PROGRESS NOTES
OT DAILY TREATMENT NOTE 10-18    Patient Name: Nathan Clause  Date:2020  : 1950  [x]  Patient  Verified  Payor: AARP MEDICARE COMPLETE / Plan: [facility] AARP MEDICARE COMPLETE / Product Type: Managed Care Medicare /    In time:4:37 PM  Out time:5:20 PM  Total Treatment Time (min): 43  Visit #: 5 of 8    Medicare/BCBS Only   Total Timed Codes (min):  33 1:1 Treatment Time:  33     Treatment Area: Hand pain, right [M79.641]  Stiffness of right hand, not elsewhere classified [M25.641]  Contracture of muscle, unspecified site [M62.40]    SUBJECTIVE  Pain Level (0-10 scale): 0/10  Any medication changes, allergies to medications, adverse drug reactions, diagnosis change, or new procedure performed?: [x] No    [] Yes (see summary sheet for update)  Subjective functional status/changes:   [] No changes reported  \"I can move my fingers better to extend it with the exercises. \"    OBJECTIVE    Modality rationale: increase tissue extensibility to improve the patients ability to improve mobility of right hand.    Min Type Additional Details    [] Estim:  []Unatt       []IFC  []Premod                        []Other:  []w/ice   []w/heat  Position:  Location:    [] Estim: []Att    []TENS instruct  []NMES                    []Other:  []w/US   []w/ice   []w/heat  Position:  Location:    []  Traction: [] Cervical       []Lumbar                       [] Prone          []Supine                       []Intermittent   []Continuous Lbs:  [] before manual  [] after manual   2x8 [x]  Ultrasound: [x]Continuous   [] Pulsed                           []1MHz   [x]3MHz W/cm2:1.0  Location: dorsal and volar surface of the right hand    []  Iontophoresis with dexamethasone         Location: [] Take home patch   [] In clinic   10 []  Ice     [x]  heat  []  Ice massage  []  Laser   []  Anodyne Position: resting  Location: right hand.    []  Laser with stim  []  Other:  Position:  Location:    []  Vasopneumatic Device Pressure:       [] lo [] med [] hi   Temperature: [] lo [] med [] hi       [x] Skin assessment post-treatment:  [x]intact []redness- no adverse reaction    []redness - adverse reaction:     17 min Manual Therapy:  IASTM using tools #4, 6 using sweeping and fanning techniques    Rationale: increase tissue extensibility and decrease edema to dorsum and volar right hand. With   [] TE   [] TA   [] neuro   [] other: Patient Education: [x] Review HEP    [] Progressed/Changed HEP based on:   [] positioning   [] body mechanics   [] transfers   [] heat/ice application   [] Splint wear/care   [] Sensory re-education   [] scar management      [] other:            Other Objective/Functional Measures: Strength:   Measurements: Taken with Perry Dynamometer, in Lbs   Level 2 1/28/2020 2/11/2020    Date Date Date Date Date   Right 22.3  27             Left 35.3               Deficit                 Change                     Pain Level (0-10 scale) post treatment: 0/10    ASSESSMENT/Changes in Function: Pt demonstrated improved dominant right hand  strength without pain. Patient will continue to benefit from skilled OT services to modify and progress therapeutic interventions, address ROM deficits, address strength deficits, analyze and address soft tissue restrictions, analyze and cue movement patterns and analyze and modify body mechanics/ergonomics to attain remaining goals. []  See Plan of Care  []  See progress note/recertification  []  See Discharge Summary         Progress towards goals / Updated goals:  Short Term Goals: To be accomplished in 2  weeks:  1. Patient will be compliant with initial home exercise program to take an active role in their rehabilitation process. Status as last note/recert:  Reports compliance with assistance from spouse. 1/30/20  2. Patient will demonstrate a good understanding of their condition and strategies for self-management.   Status as last note/recert: patient reports understanding of changes and is compliant with HEP 2/6/20     Long Term Goals: To be accomplished in 4 weeks:              1. Pt will be compliant with passive stretching HEP in order to better perform FM tasks of choice. 2. Patient will improve  strength to 25# in order to increase ability to open containers.   2/11/2020 -  27# goal met    PLAN  []  Upgrade activities as tolerated     [x]  Continue plan of care  []  Update interventions per flow sheet       []  Discharge due to:_  []  Other:_      Caro Gold OT 2/11/2020  4:40 PM    Future Appointments   Date Time Provider Maritza Elam   2/13/2020 10:30 AM Mery Grove OT [facility]   2/18/2020 10:00 AM Mery Grove OT [facility]   2/20/2020 10:00 AM Mery Grove OT [facility]   3/9/2020  8:30 AM JEANNE De La Cruz [facility]   3/10/2020  9:30 AM Camilo Marino  [address]   4/9/2020  8:30 AM Bre Velazquez NP [address]   4/16/2020  9:10 AM DO Elodia Solano [place] [address]

## 2020-02-13 ENCOUNTER — HOSPITAL ENCOUNTER (OUTPATIENT)
Dept: PHYSICAL THERAPY | Age: 70
Discharge: HOME | End: 2020-02-13
Payer: MEDICARE

## 2020-02-13 PROCEDURE — 97140 MANUAL THERAPY 1/> REGIONS: CPT

## 2020-02-13 PROCEDURE — 97035 APP MDLTY 1+ULTRASOUND EA 15: CPT

## 2020-02-13 NOTE — PROGRESS NOTES
OT DAILY TREATMENT NOTE 10-18    Patient Name: Anand Jimenez  Date:2020  : 1950  [x]  Patient  Verified  Payor: AARP MEDICARE COMPLETE / Plan: [facility] MEDICARE COMPLETE / Product Type: Managed Care Medicare /    In time:10:53 AM  Out time:11:21 AM  Total Treatment Time (min): 28  Visit #: 6 of 8    Medicare/BCBS Only   Total Timed Codes (min):  28 1:1 Treatment Time:  28     Treatment Area: Hand pain, right [M79.641]  Stiffness of right hand, not elsewhere classified [M25.641]  Contracture of muscle, unspecified site [M62.40]    SUBJECTIVE  Pain Level (0-10 scale): 0/10  Any medication changes, allergies to medications, adverse drug reactions, diagnosis change, or new procedure performed?: [x] No    [] Yes (see summary sheet for update)  Subjective functional status/changes:   [] No changes reported  Pt arrived 23 minutes late to treatment session.     OBJECTIVE    Modality rationale: decrease inflammation, decrease pain and increase tissue extensibility to improve the patients ability to improve mobility of hand   Min Type Additional Details    [] Estim:  []Unatt       []IFC  []Premod                        []Other:  []w/ice   []w/heat  Position:  Location:    [] Estim: []Att    []TENS instruct  []NMES                    []Other:  []w/US   []w/ice   []w/heat  Position:  Location:    []  Traction: [] Cervical       []Lumbar                       [] Prone          []Supine                       []Intermittent   []Continuous Lbs:  [] before manual  [] after manual   8 [x]  Ultrasound: [x]Continuous   [] Pulsed                           []1MHz   [x]3MHz W/cm2:1.0  Location: dorsum right hand    []  Iontophoresis with dexamethasone         Location: [] Take home patch   [] In clinic    []  Ice     []  heat  []  Ice massage  []  Laser   []  Paraffin Position:  Location:    []  Laser with stim  []  Other:  Position:  Location:    []  Vasopneumatic Device Pressure:       [] lo [] med [] hi Temperature: [] lo [] med [] hi       [x] Skin assessment post-treatment:  [x]intact [x]redness- no adverse reaction    []redness - adverse reaction:     20 min Manual Therapy:  IASTM using tools #4, 6 using sweeping and fanning techniques    Rationale: increase tissue extensibility and decrease edema  to dorsum and volar right hand. With   [] TE   [] TA   [] neuro   [] other: Patient Education: [x] Review HEP    [] Progressed/Changed HEP based on:   [] positioning   [] body mechanics   [] transfers   [] heat/ice application   [] Splint wear/care   [] Sensory re-education   [] scar management      [] other:            Other Objective/Functional Measures: Edema: GIRTH CHART measured in cm  Date: 1/28/2020 2/13/2020       Side Right/Left     DPC circum. 19.3/19.0 19.1    Wrist Crease 16.6/16.0     FA        Elbow           Pain Level (0-10 scale) post treatment: 0/10    ASSESSMENT/Changes in Function: Shortened treatment due to patient arriving 23 minutes late. Reduced DPC circumference noted. Patient will continue to benefit from skilled OT services to modify and progress therapeutic interventions, address ROM deficits, address strength deficits, analyze and address soft tissue restrictions, analyze and cue movement patterns and analyze and modify body mechanics/ergonomics to attain remaining goals. []  See Plan of Care  []  See progress note/recertification  []  See Discharge Summary         Progress towards goals / Updated goals:  Short Term Goals: To be accomplished in 2  weeks:  1. Patient will be compliant with initial home exercise program to take an active role in their rehabilitation process. Status as last note/recert:  Reports compliance with assistance from spouse. 1/30/20  2. Patient will demonstrate a good understanding of their condition and strategies for self-management.   Status as last note/recert: patient reports understanding of changes and is compliant with HEP 2/6/20     Long Term Goals: To be accomplished in 4 weeks:              1. Pt will be compliant with passive stretching HEP in order to better perform FM tasks of choice. 2. Patient will improve  strength to 25# in order to increase ability to open containers.   2/11/2020 -  27# goal met    PLAN  []  Upgrade activities as tolerated     [x]  Continue plan of care  []  Update interventions per flow sheet       []  Discharge due to:_  []  Other:_      Daksha Coe OT 2/13/2020  11:18 AM    Future Appointments   Date Time Provider Maritza Elam   2/18/2020 10:00 AM [facility]   [facility]20[facility]2020 10:00 [facility]   3/9/2020  8:30 AM [facility] Griffin [facility]   3/10/2020  9:30 AM Pascual White  [address]   4/9/2020  8:30 AM Red Carrasquillo NP [address]   4/16/2020  9:10 AM DO Elodia Pineda [place] [address]

## 2020-02-18 ENCOUNTER — HOSPITAL ENCOUNTER (OUTPATIENT)
Dept: PHYSICAL THERAPY | Age: 70
Discharge: HOME | End: 2020-02-18
Payer: MEDICARE

## 2020-02-18 PROCEDURE — 97035 APP MDLTY 1+ULTRASOUND EA 15: CPT

## 2020-02-18 PROCEDURE — 97140 MANUAL THERAPY 1/> REGIONS: CPT

## 2020-02-18 PROCEDURE — 97110 THERAPEUTIC EXERCISES: CPT

## 2020-02-18 NOTE — PROGRESS NOTES
OT DAILY TREATMENT NOTE 10-18    Patient Name: Liz Buenrostro  Date:2020  : 1950  [x]  Patient  Verified  Payor: AARP MEDICARE COMPLETE / Plan: [facility] AARP MEDICARE COMPLETE / Product Type: Managed Care Medicare /    In time:9:59am  Out time:10:50am  Total Treatment Time (min): 51  Visit #: 7 of 8    Medicare/BCBS Only   Total Timed Codes (min):  41 1:1 Treatment Time:  41     Treatment Area: Hand pain, right [M79.641]  Stiffness of right hand, not elsewhere classified [M25.641]  Contracture of muscle, unspecified site [M62.40]    SUBJECTIVE  Pain Level (0-10 scale): 0/10  Any medication changes, allergies to medications, adverse drug reactions, diagnosis change, or new procedure performed?: [x] No    [] Yes (see summary sheet for update)  Subjective functional status/changes:   [] No changes reported  Pt reported doing HEP and trying to use it as often as possible. She reports being able to open a jar recently which she hasn't been able to do.     OBJECTIVE    Modality rationale: decrease inflammation, decrease pain and increase tissue extensibility to improve the patients ability to  improve mobility of hand   Min Type Additional Details    [] Estim:  []Unatt       []IFC  []Premod                        []Other:  []w/ice   []w/heat  Position:  Location:    [] Estim: []Att    []TENS instruct  []NMES                    []Other:  []w/US   []w/ice   []w/heat  Position:  Location:    []  Traction: [] Cervical       []Lumbar                       [] Prone          []Supine                       []Intermittent   []Continuous Lbs:  [] before manual  [] after manual   8 [x]  Ultrasound: [x]Continuous   [] Pulsed                           []1MHz   [x]3MHz W/cm2: 1.0  Location: dorsum R hand    []  Iontophoresis with dexamethasone         Location: [] Take home patch   [] In clinic   10 []  Ice     [x]  heat  []  Ice massage  []  Laser   []  Paraffin Position: resting  Location: R hand    []  Laser with stim  []  Other:  Position:  Location:    []  Vasopneumatic Device Pressure:       [] lo [] med [] hi   Temperature: [] lo [] med [] hi       [x] Skin assessment post-treatment:  [x]intact []redness- no adverse reaction    []redness - adverse reaction:     13 min Therapeutic Exercise:  [x] See flow sheet :   Rationale: increase ROM and increase strength to improve the patients ability to use R hand for ADLs/IADLs    20 min Manual Therapy:  IASTM using tools #4, 6 using sweeping and fanning techniques    Rationale: increase ROM, increase tissue extensibility and decrease edema  to dorsum and volar right hand. With   [] TE   [] TA   [] neuro   [] other: Patient Education: [x] Review HEP    [] Progressed/Changed HEP based on:   [] positioning   [] body mechanics   [] transfers   [] heat/ice application   [] Splint wear/care   [] Sensory re-education   [] scar management      [] other:            Other Objective/Functional Measures: NT     Pain Level (0-10 scale) post treatment: 0/10    ASSESSMENT/Changes in Function: Pt responded well to treatment session today. Pt saw visible improvements post session. Patient will continue to benefit from skilled OT services to modify and progress therapeutic interventions, address ROM deficits, address strength deficits, analyze and address soft tissue restrictions, analyze and cue movement patterns and analyze and modify body mechanics/ergonomics to attain remaining goals. []  See Plan of Care  []  See progress note/recertification  []  See Discharge Summary         Progress towards goals / Updated goals:  Short Term Goals: To be accomplished in 2  weeks:  1. Patient will be compliant with initial home exercise program to take an active role in their rehabilitation process. Status as last note/recert:  Reports compliance with assistance from spouse. 1/30/20  2. Patient will demonstrate a good understanding of their condition and strategies for self-management.   Status as last note/recert: patient reports understanding of changes and is compliant with HEP 2/6/20     Long Term Goals: To be accomplished in 4 weeks:              1. Pt will be compliant with passive stretching HEP in order to better perform FM tasks of choice. 2/18/20 - Pt reports compliance with HEP  2. Patient will improve  strength to 25# in order to increase ability to open containers.   2/11/2020 -  27# goal met    PLAN  []  Upgrade activities as tolerated     [x]  Continue plan of care  []  Update interventions per flow sheet       []  Discharge due to:_  []  Other:_      Jacinta Maguire OT 2/18/2020  10:11 AM    Future Appointments   Date Time Provider Maritza Chewi   2/20/2020 10:00 AM Mj Elliott OT [id number]   3/9/2020  8:30 AM JEANNE Smith [facility]   3/10/2020  9:30 AM Noris Victor  [address]   4/9/2020  8:30 AM Ingrid Kidd NP [address]   4/16/2020  9:10 AM DO Elodia Leon [address]

## 2020-02-20 ENCOUNTER — HOSPITAL ENCOUNTER (OUTPATIENT)
Dept: PHYSICAL THERAPY | Age: 70
Discharge: HOME | End: 2020-02-20
Payer: MEDICARE

## 2020-02-20 PROCEDURE — 97110 THERAPEUTIC EXERCISES: CPT

## 2020-02-20 PROCEDURE — 97018 PARAFFIN BATH THERAPY: CPT

## 2020-02-20 PROCEDURE — 97140 MANUAL THERAPY 1/> REGIONS: CPT

## 2020-02-20 NOTE — PROGRESS NOTES
OT DISCHARGE DAILY NOTE AND SUMMARY- [facility] -    Date:2020  Patient name: Josey Garcia Start of Care: 2020   Referral source: Fernie Elizabeth DO : 1950   Medical/Treatment Diagnosis: Hand pain, right [M79.641]  Stiffness of right hand, not elsewhere classified [M25.641]  Contracture of muscle, unspecified site [M62.40] Onset Date:2019     Prior Hospitalization: see medical history Provider#: [id number]   Medications: Verified on Patient Summary List     Comorbidities:  Osteopenia, arthritis, HTN, Parkinson's Disease   Prior Level of Function: opening containers, buttoning, curling her hair    Visits from Start of Care: 8    Missed Visits: 0    Reporting Period : 2020 to 2020  [x]  Patient  Verified  Payor: [address] [place] [address] / Plan: [facility] MEDICARE COMPLETE / Product Type: Managed Care Medicare /    In time:9:57am  Out time:10:42 AM  Total Treatment Time (min): 45  Total Timed Codes (min): 30  1:1 Treatment Time ( only): 45   Visit #: 8 of 8    Treatment Area: Hand pain, right [M79.641]  Stiffness of right hand, not elsewhere classified [M25.641]  Contracture of muscle, unspecified site [M62.40]    SUBJECTIVE  Pain Level (0-10 scale): 0/10  Any medication changes, allergies to medications, adverse drug reactions, diagnosis change, or new procedure performed?: [x] No    [] Yes (see summary sheet for update)  Subjective functional status/changes:   [] No changes reported  Pt reported twisting open a honey jar today with R hand.     OBJECTIVE    Modality rationale: decrease edema, decrease inflammation and increase tissue extensibility to improve the patients ability to improve mobility of hand   Min Type Additional Details    [] Estim:  []Unatt       []IFC  []Premod                        []Other:  []w/ice   []w/heat  Position:  Location:    [] Estim: []Att    []TENS instruct  []NMES                    []Other:  []w/US   []w/ice   []w/heat  Position:  Location:    [] Traction: [] Cervical       []Lumbar                       [] Prone          []Supine                       []Intermittent   []Continuous Lbs:  [] before manual  [] after manual    []  Ultrasound: []Continuous   [] Pulsed                           []1MHz   []3MHz W/cm2:  Location:    []  Iontophoresis with dexamethasone         Location: [] Take home patch   [] In clinic   15 []  Ice     [x]  heat - paraffin  []  Ice massage  []  Laser   []  Anodyne Position: resting  Location: R hand    []  Laser with stim  []  Other:  Position:  Location:    []  Vasopneumatic Device Pressure:       [] lo [] med [] hi   Temperature: [] lo [] med [] hi   [x] Skin assessment post-treatment:  [x]intact []redness- no adverse reaction    []redness - adverse reaction:   18 min Therapeutic Exercise:  [x]? See flow sheet :   Rationale: increase ROM and increase strength to improve the patients ability to use R hand for ADLs/IADLs     12 min Manual Therapy:  IASTM using tools #4, 6 using sweeping and fanning techniques    Rationale: increase ROM, increase tissue extensibility and decrease edema  to dorsum and volar right hand.     With   [] TE   [] TA   [] neuro   [] other: Patient Education: [x] Review HEP    [] Progressed/Changed HEP based on:   [] positioning   [] body mechanics   [] transfers   [] heat/ice application   [] Splint wear/care   [] Sensory re-education   [] scar management      [] other:            Other Objective/Functional Measures: Strength:   Measurements: Taken with [employer] Dynamometer, in Lbs   Level 2 1/28/2020 2/11/2020 2/20/2020    Date Date Date Date   Right 22.3  27  34           Left 35.3               Deficit                 Change                               Edema: GIRTH CHART measured in cm  Date: 1/28/2020 2/13/2020 2/20/2020      Side Right/Left  Right Right   DPC circum.  19.3/19.0 19.1  19.0   Wrist Crease 16.6/16.0      FA         Elbow           Single Digit ROM CHART as measured in degrees  Digit  A/P Right middle digit  extension Date  Side    MP 0     PIP 10     DIP 0     RAPHAEL          Improved FOTO outcome measure score 81 (+14)     Pain Level (0-10 scale) post treatment: 0/10    Summary of Care:  Short Term Goals: To be accomplished in 2  weeks:  1. Patient will be compliant with initial home exercise program to take an active role in their rehabilitation process. Status as last note/recert:  Reports compliance with assistance from spouse. 1/30/20, goal met  2. Patient will demonstrate a good understanding of their condition and strategies for self-management. Status as last note/recert: patient reports understanding of changes and is compliant with HEP 2/6/20, goal met   Long Term Goals: To be accomplished in 4 weeks:              1. Pt will be compliant with passive stretching HEP in order to better perform FM tasks of choice. 2/18/20 - Pt reports compliance with HEP  Status at discharge 2/20/2020 - Pt demonstrates independence with HEP, goal met  2. Patient will improve  strength to 25# in order to increase ability to open containers. 2/11/2020 -  27# goal met  Status at discharge 2/20/2020 - 34 #    ASSESSMENT/Changes in Function: Pt has met all goals set for this reporting period. She has improved right hand digit extension and the mass on her hand is more pliable. She reports improved function with grasping tasks and overall functional performance. Pt was educated to continue passive stretching HEP as well as provided graded theraputty to continue  strengthening. Will d/c at this time.  Thank you for this referral.     PLAN:  [x]Discontinue therapy: [x]Patient has reached or is progressing toward set goals      []Patient is non-compliant or has abdicated      []Due to lack of appreciable progress towards set goals    Thank you for this referral!    Daksha Coe, WAI 2/20/2020 10:06 AM

## 2020-02-20 NOTE — PROGRESS NOTES
[facility] [address]  [place], [address]  [phone] fax  Patient Name: Waleska Eric  Date:2020  : 1950  [x]  Patient  Verified      Hand Therapy/ Occupational Therapy Discharge Instructions        Continue with home exercise program for 3 times a day for 4 weeks then decrease to 1 times a day as needed/patient discretion. Continue to apply heat as needed per day. Follow up with MD: 2020      Recommendations: __x__ Return to activity with home program            ____ Return to activity with the following modifcations                       ____ Post Rehab Program                                  ____Return to/Join local gym    Additional comments:          Please call with any questions or concerns. Thank you for your participation.          Tim Dawn OT 2020  10:16 AM

## 2020-02-27 ENCOUNTER — TELEPHONE (OUTPATIENT)
Dept: NEUROLOGY | Age: 70
End: 2020-02-27

## 2020-02-27 NOTE — TELEPHONE ENCOUNTER
Pt will be going out of town starting 3/11/20 and will be gone for a month. She inquires if she has enough SINEMET to get her through her trip or if she needs to request a refill. Please advise.

## 2020-02-28 NOTE — TELEPHONE ENCOUNTER
Spoke with patient verified name and , informed patient she has 6 refills available with 90 day supply each refill. Patient reports she has a [employer]	[place] in [place], where she will be for a month.

## 2020-03-08 NOTE — PROGRESS NOTES
Patient: Ishmael Madrid MRN: [id number]  SSN: [id number]    YOB: 1950  Age: 81 y.o. Sex: female      Date of Service: 3/9/2020   Provider: JEANNE Keller   Office Location:   [address] [place] [address] Dr Kishan azul, [address]  Office Phone: [phone]  Office Fax: [phone]      REASON FOR VISIT:   Chief Complaint   Patient presents with    Cholesterol Problem    Hypertension    Other     Parkinson / Pre-DM         VITALS:   Visit Vitals  /80 (BP 1 Location: Left arm, BP Patient Position: Sitting)   Pulse (!) 55   Temp 98.1 °F (36.7 °C) (Oral)   Resp 16   Ht 5' 8\" (1.727 m)   Wt 143 lb (64.9 kg)   SpO2 99%   BMI 21.74 kg/m²        MEDICATIONS:   Current Outpatient Medications on File Prior to Visit   Medication Sig Dispense Refill    gabapentin (NEURONTIN) 100 mg capsule Take 1 Cap by mouth two (2) times a day. Max Daily Amount: 200 mg. 60 Cap 0    carbidopa-levodopa (SINEMET)  mg per tablet TAKE 1 TABLET at 8 am, 12 noon, and 4 pm 270 Tab 6    valsartan-hydroCHLOROthiazide (DIOVAN-HCT) 160-25 mg per tablet TAKE 1 TABLET BY MOUTH DAILY 90 Tab 0    amLODIPine (NORVASC) 10 mg tablet Take 1 Tab by mouth daily. 90 Tab 1    rosuvastatin (CRESTOR) 20 mg tablet Take 1 Tab by mouth nightly. 90 Tab 0    calcium carbonate/vitamin D3 (CALCIUM + D PO) Take  by mouth.  cholecalciferol, VITAMIN D3, (VITAMIN D3) 5,000 unit tab tablet Take  by mouth daily.  cyanocobalamin, vitamin B-12, (VITAMIN B-12 PO) Take  by mouth. No current facility-administered medications on file prior to visit.          ALLERGIES:   No Known Allergies     MEDICAL/SURGICAL HISTORY:  Past Medical History:   Diagnosis Date    Bladder perforation, intraoperative     during hysterectomy    Hyperlipidemia     Hypertension 1990    Osteopenia     Parkinson disease ([facility] [address]     Prediabetes       Past Surgical History:   Procedure Laterality Date    HX  SECTION      HX JEROD AND BSO      IL EXTRAC ERUPTED TOOTH/EXPOSED ROOT          FAMILY HISTORY:  Family History   Problem Relation Age of Onset    Hypertension Mother     Hypertension Father     Hypertension Brother     Hypertension Brother     Kidney Disease Brother         SOCIAL HISTORY:  Social History     Tobacco Use    Smoking status: Never Smoker    Smokeless tobacco: Never Used   Substance Use Topics    Alcohol use: No    Drug use: No             HISTORY OF PRESENT ILLNESS: Diamond Stewart is a 81 y.o. female who presents to the office for a routine follow up visit. Hypertension -   BP at goal today  Compliant with meds, needs refill       Pre-DM -   Last A1c 5.7% on 19  Patient continues to work on maintaining a healthy weight through diet and regular exercise      Hyperlipidemia -  Reports compliance with Crestor 20 mg qhs  Last lipid panel done 19 showed total cholesterol 267, , HDL 76, and trig 70      Parkinson Disease -   Followed by neurology (Dr. Jose Mars), doing well on Sinemet. She's been working with OT for the contracture in her R hand. Spinal Stenosis -   With lumbar radicular symptoms. Recently started gabapentin 100 mg BID, which she states is helping considerably, though she would like to try increasing the dose a bit. She has a follow up tomorrow with Dr. Jose G Soriano at [address] [place] [address] [place]:  Review of Systems   Constitutional: Negative for chills, fever and malaise/fatigue. Respiratory: Negative for cough, shortness of breath and wheezing. Cardiovascular: Negative for chest pain, palpitations and leg swelling. Musculoskeletal: Negative for back pain. Neurological: Negative for dizziness and headaches. PHYSICAL EXAMINATION:  Physical Exam  Cardiovascular:      Rate and Rhythm: Normal rate and regular rhythm. Heart sounds: Normal heart sounds. No murmur. No friction rub. No gallop.     Pulmonary:      Effort: Pulmonary effort is normal.      Breath sounds: Normal breath sounds. No wheezing or rales. Skin:     General: Skin is warm and dry. Findings: No rash. Neurological:      Mental Status: She is alert and oriented to person, place, and time. Gait: Gait is intact. Psychiatric:         Mood and Affect: Mood and affect normal.         Cognition and Memory: Memory normal.          RESULTS:  No results found for this visit on 03/09/20. ASSESSMENT/PLAN:  Diagnoses and all orders for this visit:    1. Essential hypertension  - Stable  - Meds refilled  Orders:    -     amLODIPine (NORVASC) 10 mg tablet; Take 1 Tab by mouth daily. 2. Prediabetes  - Historically well controlled  - Re-check fasting labs prior to next visit  - Continue to work on diet & exercise  Orders:    -     HEMOGLOBIN A1C W/O EAG; Future    3. Dyslipidemia  - Both HDL and LDL have been elevated  - Patient is compliant with high dose statin  - Repeat fasting labs prior to next visit for monitoring  Orders:    -     METABOLIC PANEL, COMPREHENSIVE; Future  -     LIPID PANEL; Future    4. Parkinson disease ([facility] [address]  - Stable on current dose of Sinemet   - Neurology notes reviewed    5. Spinal stenosis of lumbar region, unspecified whether neurogenic claudication present  - Increase gabapentin to 300 mg   - f/u with Dr. Josselyn Booker tomorrow as scheduled   Orders:    -     gabapentin (NEURONTIN) 300 mg capsule; Take 1 Cap by mouth two (2) times a day. Max Daily Amount: 600 mg. Follow-up and Dispositions    · Return in about 3 months (around 6/9/2020) for annual medicare wellness / routine care and fasting labs to be done 3-5 days prior ([place] -30 mins) . All questions answered. Patient expresses understanding and agrees with the plan as detailed above.     PATIENT CARE TEAM:   Patient Care Team:  JEANNE Hernandez as PCP - General (Physician Assistant)  JEANNE Hernandez as PCP - [facility]  Oliverio Melvin MD (Neurology) JEANNE Buck   March 9, 2020   9:09 AM

## 2020-03-09 ENCOUNTER — OFFICE VISIT (OUTPATIENT)
Dept: FAMILY MEDICINE CLINIC | Age: 70
End: 2020-03-09

## 2020-03-09 VITALS
WEIGHT: 143 LBS | SYSTOLIC BLOOD PRESSURE: 122 MMHG | HEART RATE: 55 BPM | HEIGHT: 68 IN | DIASTOLIC BLOOD PRESSURE: 80 MMHG | BODY MASS INDEX: 21.67 KG/M2 | TEMPERATURE: 98.1 F | OXYGEN SATURATION: 99 % | RESPIRATION RATE: 16 BRPM

## 2020-03-09 DIAGNOSIS — M48.061 SPINAL STENOSIS OF LUMBAR REGION, UNSPECIFIED WHETHER NEUROGENIC CLAUDICATION PRESENT: ICD-10-CM

## 2020-03-09 DIAGNOSIS — R73.03 PREDIABETES: ICD-10-CM

## 2020-03-09 DIAGNOSIS — I10 ESSENTIAL HYPERTENSION WITH GOAL BLOOD PRESSURE LESS THAN 140/90: Chronic | ICD-10-CM

## 2020-03-09 DIAGNOSIS — G20 PARKINSON DISEASE (HCC): ICD-10-CM

## 2020-03-09 DIAGNOSIS — I10 ESSENTIAL HYPERTENSION: Primary | ICD-10-CM

## 2020-03-09 DIAGNOSIS — E78.5 DYSLIPIDEMIA: ICD-10-CM

## 2020-03-09 RX ORDER — GABAPENTIN 100 MG/1
100 CAPSULE ORAL 2 TIMES DAILY
Qty: 180 CAP | Refills: 0 | Status: CANCELLED | OUTPATIENT
Start: 2020-03-09

## 2020-03-09 RX ORDER — GABAPENTIN 300 MG/1
300 CAPSULE ORAL 2 TIMES DAILY
Qty: 180 CAP | Refills: 1 | Status: SHIPPED | OUTPATIENT
Start: 2020-03-09 | End: 2020-03-16 | Stop reason: DRUGHIGH

## 2020-03-09 RX ORDER — AMLODIPINE BESYLATE 10 MG/1
10 TABLET ORAL DAILY
Qty: 90 TAB | Refills: 1 | Status: SHIPPED | OUTPATIENT
Start: 2020-03-09 | End: 2020-09-15 | Stop reason: SDUPTHER

## 2020-03-09 RX ORDER — VALSARTAN AND HYDROCHLOROTHIAZIDE 160; 25 MG/1; MG/1
TABLET ORAL
Qty: 90 TAB | Refills: 1 | Status: SHIPPED | OUTPATIENT
Start: 2020-03-09 | End: 2020-08-26

## 2020-03-09 NOTE — PATIENT INSTRUCTIONS
DASH Diet: Care Instructions  Your Care Instructions    The DASH diet is an eating plan that can help lower your blood pressure. DASH stands for Dietary Approaches to Stop Hypertension. Hypertension is high blood pressure. The DASH diet focuses on eating foods that are high in calcium, potassium, and magnesium. These nutrients can lower blood pressure. The foods that are highest in these nutrients are fruits, vegetables, low-fat dairy products, nuts, seeds, and legumes. But taking calcium, potassium, and magnesium supplements instead of eating foods that are high in those nutrients does not have the same effect. The DASH diet also includes whole grains, fish, and poultry. The DASH diet is one of several lifestyle changes your doctor may recommend to lower your high blood pressure. Your doctor may also want you to decrease the amount of sodium in your diet. Lowering sodium while following the DASH diet can lower blood pressure even further than just the DASH diet alone. Follow-up care is a key part of your treatment and safety. Be sure to make and go to all appointments, and call your doctor if you are having problems. It's also a good idea to know your test results and keep a list of the medicines you take. How can you care for yourself at home? Following the DASH diet  · Eat 4 to 5 servings of fruit each day. A serving is 1 medium-sized piece of fruit, ½ cup chopped or canned fruit, 1/4 cup dried fruit, or 4 ounces (½ cup) of fruit juice. Choose fruit more often than fruit juice. · Eat 4 to 5 servings of vegetables each day. A serving is 1 cup of lettuce or raw leafy vegetables, ½ cup of chopped or cooked vegetables, or 4 ounces (½ cup) of vegetable juice. Choose vegetables more often than vegetable juice. · Get 2 to 3 servings of low-fat and fat-free dairy each day. A serving is 8 ounces of milk, 1 cup of yogurt, or 1 ½ ounces of cheese. · Eat 6 to 8 servings of grains each day.  A serving is 1 slice of bread, 1 ounce of dry cereal, or ½ cup of cooked rice, pasta, or cooked cereal. Try to choose whole-grain products as much as possible. · Limit lean meat, poultry, and fish to 2 servings each day. A serving is 3 ounces, about the size of a deck of cards. · Eat 4 to 5 servings of nuts, seeds, and legumes (cooked dried beans, lentils, and split peas) each week. A serving is 1/3 cup of nuts, 2 tablespoons of seeds, or ½ cup of cooked beans or peas. · Limit fats and oils to 2 to 3 servings each day. A serving is 1 teaspoon of vegetable oil or 2 tablespoons of salad dressing. · Limit sweets and added sugars to 5 servings or less a week. A serving is 1 tablespoon jelly or jam, ½ cup sorbet, or 1 cup of lemonade. · Eat less than 2,300 milligrams (mg) of sodium a day. If you limit your sodium to 1,500 mg a day, you can lower your blood pressure even more. Tips for success  · Start small. Do not try to make dramatic changes to your diet all at once. You might feel that you are missing out on your favorite foods and then be more likely to not follow the plan. Make small changes, and stick with them. Once those changes become habit, add a few more changes. · Try some of the following:  ? Make it a goal to eat a fruit or vegetable at every meal and at snacks. This will make it easy to get the recommended amount of fruits and vegetables each day. ? Try yogurt topped with fruit and nuts for a snack or healthy dessert. ? Add lettuce, tomato, cucumber, and onion to sandwiches. ? Combine a ready-made pizza crust with low-fat mozzarella cheese and lots of vegetable toppings. Try using tomatoes, squash, spinach, broccoli, carrots, cauliflower, and onions. ? Have a variety of cut-up vegetables with a low-fat dip as an appetizer instead of chips and dip. ? Sprinkle sunflower seeds or chopped almonds over salads. Or try adding chopped walnuts or almonds to cooked vegetables.   ? Try some vegetarian meals using beans and peas. Add garbanzo or kidney beans to salads. Make burritos and tacos with mashed louis beans or black beans. Where can you learn more? Go to http://hugo-pee.info/. Enter G996 in the search box to learn more about \"DASH Diet: Care Instructions. \"  Current as of: April 9, 2019  Content Version: 12.2  © 2355-3441 [employer]. Care instructions adapted under license by [employer] (which disclaims liability or warranty for this information). If you have questions about a medical condition or this instruction, always ask your healthcare professional. [address] any warranty or liability for your use of this information.

## 2020-03-09 NOTE — PROGRESS NOTES
1. Have you been to the ER, urgent care clinic since your last visit? Hospitalized since your last visit? No    2. Have you seen or consulted any other health care providers outside of the [address] [place] [address] since your last visit? Include any pap smears or colon screening.  No

## 2020-03-16 ENCOUNTER — TELEPHONE (OUTPATIENT)
Dept: FAMILY MEDICINE CLINIC | Age: 70
End: 2020-03-16

## 2020-03-16 DIAGNOSIS — M48.061 SPINAL STENOSIS OF LUMBAR REGION, UNSPECIFIED WHETHER NEUROGENIC CLAUDICATION PRESENT: Primary | ICD-10-CM

## 2020-03-16 RX ORDER — GABAPENTIN 100 MG/1
100 CAPSULE ORAL 2 TIMES DAILY
Qty: 60 CAP | Refills: 2 | Status: SHIPPED | OUTPATIENT
Start: 2020-03-16 | End: 2021-10-19

## 2020-03-16 NOTE — TELEPHONE ENCOUNTER
Pt states that the Gabapentin 300mg is too strong and she would like to be out back on the 100 mg. Please advise.

## 2020-03-16 NOTE — TELEPHONE ENCOUNTER
[address] [facility] [phone] (home) advising patient that Gabapentin 100 mg has been sent to pharmacy.

## 2020-04-20 ENCOUNTER — VIRTUAL VISIT (OUTPATIENT)
Dept: NEUROLOGY | Age: 70
End: 2020-04-20

## 2020-04-20 VITALS
HEART RATE: 87 BPM | BODY MASS INDEX: 21.67 KG/M2 | SYSTOLIC BLOOD PRESSURE: 146 MMHG | HEIGHT: 68 IN | WEIGHT: 143 LBS | DIASTOLIC BLOOD PRESSURE: 81 MMHG

## 2020-04-20 DIAGNOSIS — G20 PARKINSON DISEASE (HCC): Primary | ICD-10-CM

## 2020-04-20 RX ORDER — CARBIDOPA AND LEVODOPA 25; 100 MG/1; MG/1
TABLET ORAL
Qty: 270 TAB | Refills: 3 | Status: SHIPPED | OUTPATIENT
Start: 2020-04-20 | End: 2021-03-24 | Stop reason: SDUPTHER

## 2020-04-20 NOTE — PROGRESS NOTES
Consent: Madeline Osullivan, who was seen by synchronous (real-time) audio-video technology, and/or her healthcare decision maker, is aware that this patient-initiated, Telehealth encounter on 4/20/2020 is a billable service, with coverage as determined by her insurance carrier. She is aware that she may receive a bill and has provided verbal consent to proceed: Yes. Assessment & Plan:   Diagnoses and all orders for this visit:    1. Parkinson disease ([facility]  -     carbidopa-levodopa (SINEMET)  mg per tablet; TAKE 1 TABLET at 8 am, 12 noon, and 4 pm      Patient doing very well on TID dosing of Sinemet. Refills provided. Follow up in September or sooner as needed. All questions addressed and patient is agreeable with plan of care. Follow-up and Dispositions    · Return in about 4 months (around 9/1/2020) [facility]. I spent at least 25 minutes with this established patient, and >50% of the time was spent counseling and/or coordinating care regarding symptoms, management, medications, answering questions, and plan of care. 712  Subjective:   Madeline Osullivan is a 81 y.o. female who was seen for Tremors (follow up)    Follow up PD. Last seen in office in January. The visit today is complete using video technology while the patient is at home. In the interim she endorses doing quite well. Feels gait is improved. Maintained on Sinemet 1 tab three time daily. Maintaining dosing at 8 AM, 12 noon, and 4 PM.  Denies extra movements or bradykinesia. Denies falls. Sometimes at night she can see stationary objects take on forms. She is not sure if she is dreaming. Denies daytime hallucinations. She will keep an eye on this. In the interim she finished physical therapy for her hand. Requesting refills. No other concerns at this time. Prior to Admission medications    Medication Sig Start Date End Date Taking?  Authorizing Provider   carbidopa-levodopa (SINEMET)  mg per tablet TAKE 1 TABLET at 8 am, 12 noon, and 4 pm 4/20/20  Yes Betty Montano NP   gabapentin (NEURONTIN) 100 mg capsule Take 1 Cap by mouth two (2) times a day. Max Daily Amount: 200 mg. 3/16/20  Yes Elaine Garcia PA   valsartan-hydroCHLOROthiazide (DIOVAN-HCT) 160-25 mg per tablet TAKE 1 TABLET BY MOUTH DAILY 3/9/20  Yes Elaine Garcia PA   amLODIPine (NORVASC) 10 mg tablet Take 1 Tab by mouth daily. 3/9/20  Yes Elaine Garcia PA   rosuvastatin (CRESTOR) 20 mg tablet Take 1 Tab by mouth nightly. 6/19/19  Yes Elaine Garcia PA   calcium carbonate/vitamin D3 (CALCIUM + D PO) Take  by mouth. Yes Provider, Historical   cholecalciferol, VITAMIN D3, (VITAMIN D3) 5,000 unit tab tablet Take  by mouth daily. Yes Provider, Historical   cyanocobalamin, vitamin B-12, (VITAMIN B-12 PO) Take  by mouth. Yes Provider, Historical   carbidopa-levodopa (SINEMET)  mg per tablet TAKE 1 TABLET at 8 am, 12 noon, and 4 pm 1/9/20 4/20/20  Christina Montano NP     No Known Allergies    Patient Active Problem List   Diagnosis Code    Essential hypertension with goal blood pressure less than 140/90 I10    Osteopenia M85.80    Dyslipidemia E78.5    Prediabetes R73.03    Frequent urination at night R35.1    Leg heaviness R29.898    Parkinson disease ([facility] G20     Patient Active Problem List    Diagnosis Date Noted    Parkinson disease ([facility] [address] [facility] 06/19/2018    Osteopenia 06/12/2018    Dyslipidemia 06/12/2018    Prediabetes 06/12/2018    Frequent urination at night 06/12/2018    Leg heaviness 06/12/2018    Essential hypertension with goal blood pressure less than 140/90 05/31/2016     Current Outpatient Medications   Medication Sig Dispense Refill    carbidopa-levodopa (SINEMET)  mg per tablet TAKE 1 TABLET at 8 am, 12 noon, and 4 pm 270 Tab 3    gabapentin (NEURONTIN) 100 mg capsule Take 1 Cap by mouth two (2) times a day.  Max Daily Amount: 200 mg. 60 Cap 2    valsartan-hydroCHLOROthiazide (DIOVAN-HCT) 160-25 mg per tablet TAKE 1 TABLET BY MOUTH DAILY 90 Tab 1    amLODIPine (NORVASC) 10 mg tablet Take 1 Tab by mouth daily. 90 Tab 1    rosuvastatin (CRESTOR) 20 mg tablet Take 1 Tab by mouth nightly. 90 Tab 0    calcium carbonate/vitamin D3 (CALCIUM + D PO) Take  by mouth.  cholecalciferol, VITAMIN D3, (VITAMIN D3) 5,000 unit tab tablet Take  by mouth daily.  cyanocobalamin, vitamin B-12, (VITAMIN B-12 PO) Take  by mouth.        No Known Allergies  Past Medical History:   Diagnosis Date    Bladder perforation, intraoperative     during hysterectomy    Hyperlipidemia     Hypertension     Osteopenia     Parkinson disease ([facility] [address]     Prediabetes      Past Surgical History:   Procedure Laterality Date    HX  SECTION      HX JEROD AND BSO      MT EXTRAC ERUPTED TOOTH/EXPOSED ROOT       Family History   Problem Relation Age of Onset    Hypertension Mother     Hypertension Father     Hypertension Brother     Hypertension Brother     Kidney Disease Brother      Social History     Tobacco Use    Smoking status: Never Smoker    Smokeless tobacco: Never Used   Substance Use Topics    Alcohol use: No       Review of Systems  GENERAL: Denies fever or fatigue  CARDIAC: No CP or SOB  PULMONARY: No cough of SOB  MUSCULOSKELETAL: No new joint pain  NEURO: SEE HPI      Objective:     Visit Vitals  /81 Comment: patient taken   Pulse 87   Ht 5' 8\" (1.727 m)   Wt 64.9 kg (143 lb) Comment: patient taken   BMI 21.74 kg/m²      General: alert, cooperative, no distress   Mental  status: normal mood, behavior, speech, dress, motor activity, and thought processes, able to follow commands   HENT: NCAT   Neck: no visualized mass   Resp: no respiratory distress   Neuro: no gross deficits   Skin: no discoloration or lesions of concern on visible areas   Psychiatric: normal affect, consistent with stated mood, no evidence of hallucinations     Additional exam findings: This is a very pleasant 82-year-old female. She is alert and in no apparent distress. Full fund of knowledge. Speech is clear. Extraocular movements intact. No nystagmus. No facial asymmetry. Tongue midline. Equal shoulder shrug. Finger-nose-finger intact. No rest tremor appreciated. Ambulates steady with good arm swing. Takes less than three-point turn. No festination. We discussed the expected course, resolution and complications of the diagnosis(es) in detail. Medication risks, benefits, costs, interactions, and alternatives were discussed as indicated. I advised her to contact the office if her condition worsens, changes or fails to improve as anticipated. She expressed understanding with the diagnosis(es) and plan. Ken Dutta is a 81 y.o. female being evaluated by a video visit encounter for concerns as above. A caregiver was present when appropriate. Due to this being a TeleHealth encounter (During ABBHO-07 public health emergency), evaluation of the following organ systems was limited: Vitals/Constitutional/EENT/Resp/CV/GI//MS/Neuro/Skin/Heme-Lymph-Imm. Pursuant to the emergency declaration under the [facility] waiver authority and the [employer] General Act, this Virtual  Visit was conducted, with patient's (and/or legal guardian's) consent, to reduce the patient's risk of exposure to COVID-19 and provide necessary medical care. Services were provided through a video synchronous discussion virtually to substitute for in-person clinic visit. Patient and provider were located at their individual homes. Pat LEOPOLDO Hadley  This note will not be viewable in [address]

## 2020-04-20 NOTE — PROGRESS NOTES
Chief Complaint   Patient presents with    Tremors     follow up       Fco Alexander presents today for   Chief Complaint   Patient presents with    Tremors     follow up       Is someone accompanying this pt? Virtual visit [phone]    Is the patient using any DME equipment during [address]? no    Depression Screening:  3 most recent PHQ Screens 3/9/2020   Little interest or pleasure in doing things Not at all   Feeling down, depressed, irritable, or hopeless Not at all   Total Score PHQ 2 0       Learning Assessment:  Learning Assessment 6/12/2018   PRIMARY LEARNER Patient   HIGHEST LEVEL OF EDUCATION - PRIMARY LEARNER  4 YEARS OF COLLEGE   BARRIERS PRIMARY LEARNER NONE   CO-LEARNER CAREGIVER No   CO-LEARNER NAME -   PRIMARY LANGUAGE ENGLISH   LEARNER PREFERENCE PRIMARY READING   ANSWERED BY patient   RELATIONSHIP SELF       Abuse Screening:  Abuse Screening Questionnaire 3/9/2020   Do you ever feel afraid of your partner? N   Are you in a relationship with someone who physically or mentally threatens you? N   Is it safe for you to go home? Y       Fall Risk  Fall Risk Assessment, last 12 mths 3/9/2020   Able to walk? Yes   Fall in past 12 months? No         Coordination of Care:  1. Have you been to the ER, urgent care clinic since your last visit? Hospitalized since your last visit? no    2. Have you seen or consulted any other health care providers outside of the [address] [place] [address] since your last visit? Include any pap smears or colon screening.  no

## 2020-04-20 NOTE — PATIENT INSTRUCTIONS
Thank you for choosing [employer] [facility] for your  
 
care. You may receive a survey about your visit. We appreciate you taking time  
 
to complete this survey as we use your feedback to improve our services. We  
 
realize we are not perfect, but we strive to provide excellent care.

## 2020-08-05 ENCOUNTER — HOSPITAL ENCOUNTER (OUTPATIENT)
Dept: LAB | Age: 70
Discharge: HOME | End: 2020-08-05

## 2020-08-05 LAB — XX-LABCORP SPECIMEN COL,LCBCF: NORMAL

## 2020-08-05 PROCEDURE — 99001 SPECIMEN HANDLING PT-LAB: CPT

## 2020-08-06 LAB
ALBUMIN SERPL-MCNC: 4.6 G/DL (ref 3.8–4.8)
ALBUMIN/GLOB SERPL: 1.8 {RATIO} (ref 1.2–2.2)
ALP SERPL-CCNC: 64 IU/L (ref 39–117)
ALT SERPL-CCNC: 24 IU/L (ref 0–32)
AST SERPL-CCNC: 17 IU/L (ref 0–40)
BILIRUB SERPL-MCNC: 0.6 MG/DL (ref 0–1.2)
BUN SERPL-MCNC: 25 MG/DL (ref 8–27)
BUN/CREAT SERPL: 27 (ref 12–28)
CALCIUM SERPL-MCNC: 9.6 MG/DL (ref 8.7–10.3)
CHLORIDE SERPL-SCNC: 100 MMOL/L (ref 96–106)
CHOLEST SERPL-MCNC: 284 MG/DL (ref 100–199)
CO2 SERPL-SCNC: 25 MMOL/L (ref 20–29)
COMMENT, 011824: ABNORMAL
CREAT SERPL-MCNC: 0.93 MG/DL (ref 0.57–1)
GLOBULIN SER CALC-MCNC: 2.6 G/DL (ref 1.5–4.5)
GLUCOSE SERPL-MCNC: 96 MG/DL (ref 65–99)
HBA1C MFR BLD: 5.8 % (ref 4.8–5.6)
HDLC SERPL-MCNC: 72 MG/DL
INTERPRETATION, 910389: NORMAL
LDLC SERPL CALC-MCNC: 196 MG/DL (ref 0–99)
POTASSIUM SERPL-SCNC: 4 MMOL/L (ref 3.5–5.2)
PROT SERPL-MCNC: 7.2 G/DL (ref 6–8.5)
SODIUM SERPL-SCNC: 140 MMOL/L (ref 134–144)
TRIGL SERPL-MCNC: 78 MG/DL (ref 0–149)
VLDLC SERPL CALC-MCNC: 16 MG/DL (ref 5–40)

## 2020-08-07 ENCOUNTER — TELEPHONE (OUTPATIENT)
Dept: FAMILY MEDICINE CLINIC | Age: 70
End: 2020-08-07

## 2020-08-07 NOTE — TELEPHONE ENCOUNTER
Pt is requesting the results of her labs done on 8/5/2020 and she would like a copy of the results mailed to her please at   [address]

## 2020-08-08 ENCOUNTER — HOSPITAL ENCOUNTER (OUTPATIENT)
Dept: MAMMOGRAPHY | Age: 70
Discharge: HOME | End: 2020-08-08
Attending: FAMILY MEDICINE
Payer: MEDICARE

## 2020-08-08 DIAGNOSIS — Z12.31 VISIT FOR SCREENING MAMMOGRAM: ICD-10-CM

## 2020-08-08 PROCEDURE — 77063 BREAST TOMOSYNTHESIS BI: CPT

## 2020-08-10 ENCOUNTER — TELEPHONE (OUTPATIENT)
Dept: FAMILY MEDICINE CLINIC | Age: 70
End: 2020-08-10

## 2020-08-10 NOTE — TELEPHONE ENCOUNTER
Patient called regarding recent lab results. and mammogram.  Please call patient back at your earliest convenience.

## 2020-08-11 NOTE — TELEPHONE ENCOUNTER
Patient identifiers verified. Patient advised that her cholesterol is elevated and her A1c shows she has mild prediabetes. Dr. Bruno Peralta will discuss at her Sept follow-up. Patient was made aware yesterday of normal mammogram (see result note).

## 2020-08-11 NOTE — TELEPHONE ENCOUNTER
Cholesterol is elevated. We will discuss at her Sept follow-up. And her A1c shows she has mild prediabetes.

## 2020-08-26 DIAGNOSIS — I10 ESSENTIAL HYPERTENSION WITH GOAL BLOOD PRESSURE LESS THAN 140/90: Chronic | ICD-10-CM

## 2020-08-26 RX ORDER — VALSARTAN AND HYDROCHLOROTHIAZIDE 160; 25 MG/1; MG/1
TABLET ORAL
Qty: 90 TAB | Refills: 0 | Status: SHIPPED | OUTPATIENT
Start: 2020-08-26 | End: 2020-11-27

## 2020-09-08 ENCOUNTER — VIRTUAL VISIT (OUTPATIENT)
Dept: NEUROLOGY | Age: 70
End: 2020-09-08

## 2020-09-08 DIAGNOSIS — G20 PARKINSON DISEASE (HCC): Primary | ICD-10-CM

## 2020-09-08 NOTE — PROGRESS NOTES
Hernandez Son presents today for   Chief Complaint   Patient presents with    Tremors     follow up       Is someone accompanying this pt? Virtual visit [phone]    Is the patient using any DME equipment during [address]? no    Depression Screening:  3 most recent PHQ Screens 3/9/2020   Little interest or pleasure in doing things Not at all   Feeling down, depressed, irritable, or hopeless Not at all   Total Score PHQ 2 0       Learning Assessment:  Learning Assessment 6/12/2018   PRIMARY LEARNER Patient   HIGHEST LEVEL OF EDUCATION - PRIMARY LEARNER  4 YEARS OF COLLEGE   BARRIERS PRIMARY LEARNER NONE   CO-LEARNER CAREGIVER No   CO-LEARNER NAME -   PRIMARY LANGUAGE ENGLISH   LEARNER PREFERENCE PRIMARY READING   ANSWERED BY patient   RELATIONSHIP SELF       Abuse Screening:  Abuse Screening Questionnaire 3/9/2020   Do you ever feel afraid of your partner? N   Are you in a relationship with someone who physically or mentally threatens you? N   Is it safe for you to go home? Y       Fall Risk  Fall Risk Assessment, last 12 mths 3/9/2020   Able to walk? Yes   Fall in past 12 months? No         Coordination of Care:  1. Have you been to the ER, urgent care clinic since your last visit? Hospitalized since your last visit? no    2. Have you seen or consulted any other health care providers outside of the [address] [place] [address] since your last visit? Include any pap smears or colon screening.  no

## 2020-09-08 NOTE — PROGRESS NOTES
Wayne Dove is a 78 y.o. female who was seen by synchronous (real-time) audio-video technology on 9/8/2020 for Tremors (follow up)        Assessment & Plan:   Diagnoses and all orders for this visit:    1. Parkinson disease [facility])      Patient doing very well on TID dosing of Sinemet. Ensured adequate supply. Follow up in 6 months or sooner as needed. All questions addressed and patient is agreeable with plan of care.     I spent at least 25 minutes on this visit with this established patient. 712  Subjective: Follow up PD. Last seen in April. In the interim endorses she is doing quite well. Maintained on Sinemet  mg one tab TID. 8 am, 12 noon, and 4 pm dosing. Denies freezing or wearing off. Denies falls. Denies extra movements, hallucinations, or bradykinesia. No other concerns at this time. Prior to Admission medications    Medication Sig Start Date End Date Taking? Authorizing Provider   valsartan-hydroCHLOROthiazide (DIOVAN-HCT) 160-25 mg per tablet TAKE 1 TABLET BY MOUTH DAILY 8/26/20  Yes Abran Taveras MD   carbidopa-levodopa (SINEMET)  mg per tablet TAKE 1 TABLET at 8 am, 12 noon, and 4 pm 4/20/20  Yes Sharlene Montano NP   gabapentin (NEURONTIN) 100 mg capsule Take 1 Cap by mouth two (2) times a day. Max Daily Amount: 200 mg. 3/16/20  Yes Elaine Garcia PA   amLODIPine (NORVASC) 10 mg tablet Take 1 Tab by mouth daily. 3/9/20  Yes Elaine Garcia PA   rosuvastatin (CRESTOR) 20 mg tablet Take 1 Tab by mouth nightly. 6/19/19  Yes Elaine Garcia PA   calcium carbonate/vitamin D3 (CALCIUM + D PO) Take  by mouth. Yes Provider, Historical   cholecalciferol, VITAMIN D3, (VITAMIN D3) 5,000 unit tab tablet Take  by mouth daily. Yes Provider, Historical   cyanocobalamin, vitamin B-12, (VITAMIN B-12 PO) Take  by mouth.    Yes Provider, Historical     Patient Active Problem List   Diagnosis Code    Essential hypertension with goal blood pressure less than 140/90 I10    Osteopenia M85.80    Dyslipidemia E78.5    Prediabetes R73.03    Frequent urination at night R35.1    Leg heaviness R29.898    Parkinson disease ([facility] G20     Patient Active Problem List    Diagnosis Date Noted    Parkinson disease ([facility] 2018    Osteopenia 2018    Dyslipidemia 2018    Prediabetes 2018    Frequent urination at night 2018    Leg heaviness 2018    Essential hypertension with goal blood pressure less than 140/90 2016     Current Outpatient Medications   Medication Sig Dispense Refill    valsartan-hydroCHLOROthiazide (DIOVAN-HCT) 160-25 mg per tablet TAKE 1 TABLET BY MOUTH DAILY 90 Tab 0    carbidopa-levodopa (SINEMET)  mg per tablet TAKE 1 TABLET at 8 am, 12 noon, and 4 pm 270 Tab 3    gabapentin (NEURONTIN) 100 mg capsule Take 1 Cap by mouth two (2) times a day. Max Daily Amount: 200 mg. 60 Cap 2    amLODIPine (NORVASC) 10 mg tablet Take 1 Tab by mouth daily. 90 Tab 1    rosuvastatin (CRESTOR) 20 mg tablet Take 1 Tab by mouth nightly. 90 Tab 0    calcium carbonate/vitamin D3 (CALCIUM + D PO) Take  by mouth.  cholecalciferol, VITAMIN D3, (VITAMIN D3) 5,000 unit tab tablet Take  by mouth daily.  cyanocobalamin, vitamin B-12, (VITAMIN B-12 PO) Take  by mouth.        No Known Allergies  Past Medical History:   Diagnosis Date    Bladder perforation, intraoperative     during hysterectomy    Hyperlipidemia     Hypertension     Osteopenia     Parkinson disease ([facility]     Prediabetes      Past Surgical History:   Procedure Laterality Date    HX  SECTION      HX JEROD AND BSO      NH EXTRAC ERUPTED TOOTH/EXPOSED ROOT       Family History   Problem Relation Age of Onset    Hypertension Mother     Hypertension Father     Hypertension Brother     Hypertension Brother     Kidney Disease Brother      Social History     Tobacco Use    Smoking status: Never Smoker    Smokeless tobacco: Never Used   Substance Use Topics  Alcohol use: No       Review of Systems  GENERAL: Denies fever or fatigue  CARDIAC: No CP or SOB  PULMONARY: No cough of SOB  MUSCULOSKELETAL: No new joint pain  NEURO: SEE HPI      Objective:     Patient-Reported Vitals 9/8/2020   Patient-Reported Weight 138.4lb   Patient-Reported Pulse 73   Patient-Reported Temperature 97.0   Patient-Reported Systolic  270   Patient-Reported Diastolic 71      General: alert, cooperative, no distress   Mental  status: normal mood, behavior, speech, dress, motor activity, and thought processes, able to follow commands   HENT: NCAT   Neck: no visualized mass   Resp: no respiratory distress   Neuro: no gross deficits   Skin: no discoloration or lesions of concern on visible areas   Psychiatric: normal affect, consistent with stated mood, no evidence of hallucinations     Additional exam findings: This is a very pleasant 77-year-old female. She is alert and in no apparent distress. Full fund of knowledge. Speech is clear. Extraocular movements intact. No nystagmus. No facial asymmetry. Tongue midline. Equal shoulder shrug. Finger-nose-finger intact. No rest tremor appreciated. Ambulates steady with good arm swing. Takes less than three-point turn. No festination. We discussed the expected course, resolution and complications of the diagnosis(es) in detail. Medication risks, benefits, costs, interactions, and alternatives were discussed as indicated. I advised her to contact the office if her condition worsens, changes or fails to improve as anticipated. She expressed understanding with the diagnosis(es) and plan. David Son, who was evaluated through a patient-initiated, synchronous (real-time) audio-video encounter, and/or her healthcare decision maker, is aware that it is a billable service, with coverage as determined by her insurance carrier. She provided verbal consent to proceed: Yes, and patient identification was verified.  It was conducted pursuant to the emergency declaration under the [facility] authority and the [employer] Act. A caregiver was present when appropriate. Ability to conduct physical exam was limited. I was at home. The patient was at home. Kannan Gonzalez NP  This note will not be viewable in [address]

## 2020-09-15 ENCOUNTER — OFFICE VISIT (OUTPATIENT)
Dept: FAMILY MEDICINE CLINIC | Age: 70
End: 2020-09-15

## 2020-09-15 ENCOUNTER — VIRTUAL VISIT (OUTPATIENT)
Dept: FAMILY MEDICINE CLINIC | Age: 70
End: 2020-09-15

## 2020-09-15 DIAGNOSIS — Z00.00 MEDICARE ANNUAL WELLNESS VISIT, SUBSEQUENT: ICD-10-CM

## 2020-09-15 DIAGNOSIS — Z13.31 SCREENING FOR DEPRESSION: ICD-10-CM

## 2020-09-15 DIAGNOSIS — E78.5 DYSLIPIDEMIA: ICD-10-CM

## 2020-09-15 DIAGNOSIS — G20 PARKINSON DISEASE (HCC): ICD-10-CM

## 2020-09-15 DIAGNOSIS — Z82.49 FAMILY HISTORY OF HEART DISEASE: ICD-10-CM

## 2020-09-15 DIAGNOSIS — M48.061 SPINAL STENOSIS OF LUMBAR REGION, UNSPECIFIED WHETHER NEUROGENIC CLAUDICATION PRESENT: ICD-10-CM

## 2020-09-15 DIAGNOSIS — R73.03 PREDIABETES: ICD-10-CM

## 2020-09-15 DIAGNOSIS — Z12.11 COLON CANCER SCREENING: ICD-10-CM

## 2020-09-15 DIAGNOSIS — I10 ESSENTIAL HYPERTENSION: Primary | ICD-10-CM

## 2020-09-15 DIAGNOSIS — Z71.89 ADVANCED DIRECTIVES, COUNSELING/DISCUSSION: ICD-10-CM

## 2020-09-15 RX ORDER — AMLODIPINE BESYLATE 10 MG/1
10 TABLET ORAL DAILY
Qty: 90 TAB | Refills: 1 | Status: SHIPPED | OUTPATIENT
Start: 2020-09-15 | End: 2021-05-21 | Stop reason: SDUPTHER

## 2020-09-15 NOTE — PROGRESS NOTES
Rosa Maria Deleon, who was evaluated through a synchronous (real-time) audio-video encounter, and/or her healthcare decision maker, is aware that it is a billable service, with coverage as determined by her insurance carrier. She provided verbal consent to proceed: Yes, and patient identification was verified. It was conducted pursuant to the emergency declaration under the [facility], [address] [place] [address] authority and the [employer] General Act. A caregiver was present when appropriate. Ability to conduct physical exam was limited. I was in the office. The patient was at home. Chief Complaint   Patient presents with    Annual Wellness Visit     This is the Subsequent Medicare Annual Wellness Exam, performed 12 months or more after the Initial AWV or the last Subsequent AWV    I have reviewed the patient's medical history in detail and updated the computerized patient record.      History     Patient Active Problem List   Diagnosis Code    Essential hypertension with goal blood pressure less than 140/90 I10    Osteopenia M85.80    Dyslipidemia E78.5    Prediabetes R73.03    Frequent urination at night R35.1    Leg heaviness R29.898    Parkinson disease ([address] Wadie Earthly     Past Medical History:   Diagnosis Date    Bladder perforation, intraoperative     during hysterectomy    Hyperlipidemia     Hypertension     Osteopenia     Parkinson disease ([address]     Prediabetes       Past Surgical History:   Procedure Laterality Date    HX  SECTION      HX JEROD AND BSO      CT EXTRAC ERUPTED TOOTH/EXPOSED ROOT       Current Outpatient Medications   Medication Sig Dispense Refill    valsartan-hydroCHLOROthiazide (DIOVAN-HCT) 160-25 mg per tablet TAKE 1 TABLET BY MOUTH DAILY 90 Tab 0    carbidopa-levodopa (SINEMET)  mg per tablet TAKE 1 TABLET at 8 am, 12 noon, and 4 pm 270 Tab 3    gabapentin (NEURONTIN) 100 mg capsule Take 1 Cap by mouth two (2) times a day. Max Daily Amount: 200 mg. (Patient taking differently: Take 100 mg by mouth nightly as needed.) 60 Cap 2    rosuvastatin (CRESTOR) 20 mg tablet Take 1 Tab by mouth nightly. 90 Tab 0    calcium carbonate/vitamin D3 (CALCIUM + D PO) Take  by mouth.  cholecalciferol, VITAMIN D3, (VITAMIN D3) 5,000 unit tab tablet Take  by mouth daily.  cyanocobalamin, vitamin B-12, (VITAMIN B-12 PO) Take  by mouth.  amLODIPine (NORVASC) 10 mg tablet Take 1 Tab by mouth daily. 80 Tab 1     No Known Allergies    Family History   Problem Relation Age of Onset    Hypertension Mother     Hypertension Father     Hypertension Brother     Hypertension Brother     Kidney Disease Brother      Social History     Tobacco Use    Smoking status: Never Smoker    Smokeless tobacco: Never Used   Substance Use Topics    Alcohol use: No       Depression Risk Factor Screening:     3 most recent PHQ Screens 9/15/2020   Little interest or pleasure in doing things Not at all   Feeling down, depressed, irritable, or hopeless Not at all   Total Score PHQ 2 0       Alcohol Risk Screen   Do you average more than 1 drink per night or more than 7 drinks a week:  No    On any one occasion in the past three months have you have had more than 3 drinks containing alcohol:  No        Functional Ability and Level of Safety:   Hearing: Hearing is good. Activities of Daily Living: The home contains: no safety equipment. Patient does total self care     Ambulation: with no difficulty     Fall Risk:  Fall Risk Assessment, last 12 mths 9/15/2020   Able to walk? Yes   Fall in past 12 months?  No     Abuse Screen:  Patient is not abused       Cognitive Screening   Has your family/caregiver stated any concerns about your memory: no     Patient Care Team   Patient Care Team:  Nadine Hazel MD as PCP - General (Family Medicine)  Nadine Hazel MD as PCP - [facility] Empaneled Provider  Chandan Suárez MD (Neurology)    Assessment/Plan   Education and counseling provided:  Are appropriate based on today's review and evaluation     ICD-10-CM ICD-9-CM    1. Medicare annual wellness visit, subsequent  Z00.00 V70.0    2. Advanced directives, counseling/discussion  Z71.89 V65.49 ADVANCE CARE PLANNING FIRST 30 MINS   3. Screening for depression  Z13.31 V79.0 DEPRESSION SCREEN ANNUAL     Age and sex specific counseling. Screening for depression and alcoholism. Advanced directives / end of life planning discussion - see ACP note. Care team updated. Patient does not typically get flu vaccines. She will go to the pharmacy and get her PPSV23 vaccine. Medicare recommended screening and prevention test table is filled out, reviewed, and provided to patient. Scanned to chart as well. Ordered FIT test for CRCS. Patient understands our medical plan. Patient has provided input and agrees with goals. All questions answered.

## 2020-09-15 NOTE — PROGRESS NOTES
1. Have you been to the ER, urgent care clinic since your last visit? Hospitalized since your last visit? No    2. Have you seen or consulted any other health care providers outside of the [address] [place] [address] since your last visit? Include any pap smears or colon screening.  No     Eye exam 2018- Kianna Mehta in [place]   No colonoscopy

## 2020-09-15 NOTE — PATIENT INSTRUCTIONS
Medicare Wellness Visit, Female The best way to live healthy is to have a lifestyle where you eat a well-balanced diet, exercise regularly, limit alcohol use, and quit all forms of tobacco/nicotine, if applicable. Regular preventive services are another way to keep healthy. Preventive services (vaccines, screening tests, monitoring & exams) can help personalize your care plan, which helps you manage your own care. Screening tests can find health problems at the earliest stages, when they are easiest to treat. [facility] follows the current, evidence-based guidelines published by the [facility] Nick Mcdonald ([facility]) when recommending preventive services for our patients. Because we follow these guidelines, sometimes recommendations change over time as research supports it. (For example, mammograms used to be recommended annually. Even though Medicare will still pay for an annual mammogram, the newer guidelines recommend a mammogram every two years for women of average risk). Of course, you and your doctor may decide to screen more often for some diseases, based on your risk and your co-morbidities (chronic disease you are already diagnosed with). Preventive services for you include: - Medicare offers their members a free annual wellness visit, which is time for you and your primary care provider to discuss and plan for your preventive service needs. Take advantage of this benefit every year! 
-All adults over the age of 72 should receive the recommended pneumonia vaccines. Current USPSTF guidelines recommend a series of two vaccines for the best pneumonia protection.  
-All adults should have a flu vaccine yearly and a tetanus vaccine every 10 years.  
-All adults age 48 and older should receive the shingles vaccines (series of two vaccines). -All adults age 38-68 who are overweight should have a diabetes screening test once every three years. -All adults born between [birth year] and 1963 should be screened once for Hepatitis C. 
-Other screening tests and preventive services for persons with diabetes include: an eye exam to screen for diabetic retinopathy, a kidney function test, a foot exam, and stricter control over your cholesterol.  
-Cardiovascular screening for adults with routine risk involves an electrocardiogram (ECG) at intervals determined by your doctor.  
-Colorectal cancer screenings should be done for adults age 54-65 with no increased risk factors for colorectal cancer. There are a number of acceptable methods of screening for this type of cancer. Each test has its own benefits and drawbacks. Discuss with your doctor what is most appropriate for you during your annual wellness visit. The different tests include: colonoscopy (considered the best screening method), a fecal occult blood test, a fecal DNA test, and sigmoidoscopy. 
 
-A bone mass density test is recommended when a woman turns 65 to screen for osteoporosis. This test is only recommended one time, as a screening. Some providers will use this same test as a disease monitoring tool if you already have osteoporosis. -Breast cancer screenings are recommended every other year for women of normal risk, age 54-69. 
-Cervical cancer screenings for women over age 72 are only recommended with certain risk factors. Here is a list of your current Health Maintenance items (your personalized list of preventive services) with a due date: 
Health Maintenance Due Topic Date Due  Glaucoma Screening   06/06/2017  Bone Densitometry  05/01/2020 [address] [place] [address] Annual Well Visit  06/19/2020  Pneumococcal Vaccine (2 of 2 - PPSV23) 06/19/2020  Colon Cancer Stool Test  07/17/2020  Yearly Flu Vaccine (1) 09/01/2020

## 2020-09-15 NOTE — ACP (ADVANCE CARE PLANNING)
Advance Care Planning       Advance Care Planning (ACP) Physician/NP/PA (Provider) Conversation        Date of ACP Conversation: 9/15/2020    Conversation Conducted with:   Patient with Decision Making [address] Maker:    Current Designated Health Care Decision Maker:   Primary Decision Maker: Kashif Tran - [phone]    Secondary Decision Maker: Josefina Self - Other Relative - [phone]    Care Preferences:    Ventilation: To be determined after discussion and formation of ACP by  and wife. Resuscitation: To be determined after discussion and formation of ACP by  and wife. 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 Encouraged her to work on an ACP / living will.     Conversation Outcomes / Follow-Up Plan:   Recommended completion of Advance Directive      Length of Voluntary ACP Conversation in minutes:  16 minutes      Bella Pena MD

## 2020-09-15 NOTE — PROGRESS NOTES
Chief Complaint   Patient presents with   [address] [place] [address] Annual Wellness Visit     3 most recent PHQ Screens 9/15/2020   Little interest or pleasure in doing things Not at all   Feeling down, depressed, irritable, or hopeless Not at all   Total Score PHQ 2 0     Abuse Screening Questionnaire 9/15/2020   Do you ever feel afraid of your partner? N   Are you in a relationship with someone who physically or mentally threatens you? N   Is it safe for you to go home? Y     Fall Risk Assessment, last 12 mths 9/15/2020   Able to walk? Yes   Fall in past 12 months? No     1. Have you been to the ER, urgent care clinic since your last visit? Hospitalized since your last visit? No    2. Have you seen or consulted any other health care providers outside of the [address] [place] [address] since your last visit? Include any pap smears or colon screening.  No

## 2020-09-15 NOTE — PATIENT INSTRUCTIONS
A Healthy Lifestyle: Care Instructions Your Care Instructions A healthy lifestyle can help you feel good, stay at a healthy weight, and have plenty of energy for both work and play. A healthy lifestyle is something you can share with your whole family. A healthy lifestyle also can lower your risk for serious health problems, such as high blood pressure, heart disease, and diabetes. You can follow a few steps listed below to improve your health and the health of your family. Follow-up care is a key part of your treatment and safety. Be sure to make and go to all appointments, and call your doctor if you are having problems. It's also a good idea to know your test results and keep a list of the medicines you take. How can you care for yourself at home? · Do not eat too much sugar, fat, or fast foods. You can still have dessert and treats now and then. The goal is moderation. · Start small to improve your eating habits. Pay attention to portion sizes, drink less juice and soda pop, and eat more fruits and vegetables. ? Eat a healthy amount of food. A 3-ounce serving of meat, for example, is about the size of a deck of cards. Fill the rest of your plate with vegetables and whole grains. ? Limit the amount of soda and sports drinks you have every day. Drink more water when you are thirsty. ? Eat at least 5 servings of fruits and vegetables every day. It may seem like a lot, but it is not hard to reach this goal. A serving or helping is 1 piece of fruit, 1 cup of vegetables, or 2 cups of leafy, raw vegetables. Have an apple or some carrot sticks as an afternoon snack instead of a candy bar. Try to have fruits and/or vegetables at every meal. 
· Make exercise part of your daily routine. You may want to start with simple activities, such as walking, bicycling, or slow swimming. Try to be active 30 to 60 minutes every day.  You do not need to do all 30 to 60 minutes all at once. For example, you can exercise 3 times a day for 10 or 20 minutes. Moderate exercise is safe for most people, but it is always a good idea to talk to your doctor before starting an exercise program. 
· Keep moving. Estefania Balls the lawn, work in the garden, or [employer]. Take the stairs instead of the elevator at work. · If you smoke, quit. People who smoke have an increased risk for heart attack, stroke, cancer, and other lung illnesses. Quitting is hard, but there are ways to boost your chance of quitting tobacco for good. ? Use nicotine gum, patches, or lozenges. ? Ask your doctor about stop-smoking programs and medicines. ? Keep trying. In addition to reducing your risk of diseases in the future, you will notice some benefits soon after you stop using tobacco. If you have shortness of breath or asthma symptoms, they will likely get better within a few weeks after you quit. · Limit how much alcohol you drink. Moderate amounts of alcohol (up to 2 drinks a day for men, 1 drink a day for women) are okay. But drinking too much can lead to liver problems, high blood pressure, and other health problems. Family health If you have a family, there are many things you can do together to improve your health. · Eat meals together as a family as often as possible. · Eat healthy foods. This includes fruits, vegetables, lean meats and dairy, and whole grains. · Include your family in your fitness plan. Most people think of activities such as jogging or tennis as the way to fitness, but there are many ways you and your family can be more active. Anything that makes you breathe hard and gets your heart pumping is exercise. Here are some tips: 
? Walk to do errands or to take your child to school or the bus. 
? Go for a family bike ride after dinner instead of watching TV. Where can you learn more? Go to http://hugo-pee.info/ Enter L437 in the search box to learn more about \"A Healthy Lifestyle: Care Instructions. \" Current as of: January 31, 2020               Content Version: 12.6 © 9183-1036 [employer]. Care instructions adapted under license by [employer] (which disclaims liability or warranty for this information). If you have questions about a medical condition or this instruction, always ask your healthcare professional. [place] [address] any warranty or liability for your use of this information.

## 2020-09-15 NOTE — PROGRESS NOTES
Dane Elaine, who was evaluated through a synchronous (real-time) audio-video encounter, and/or her healthcare decision maker, is aware that it is a billable service, with coverage as determined by her insurance carrier. She provided verbal consent to proceed: Yes, and patient identification was verified. It was conducted pursuant to the emergency declaration under the [facility], [address] [place] [address] and the Rojelio Offerama and [employer] Act. A caregiver was present when appropriate. Ability to conduct physical exam was limited. I was in the office. The patient was at home. SUBJECTIVE  Chief Complaint   Patient presents with    Hypertension    Cholesterol Problem    Medication Refill     Patient presents to establish care since her former PCP left the office on a family  deployment. Hypertension -   BP at goal today based on home BP reading which is verified. Compliant with meds, needs refill of Norvasc.      Pre-DM -   Current A1c 5.8%   Patient continues to work on maintaining a healthy weight through diet and regular exercise      Hyperlipidemia -  Reports compliance with Crestor 20 mg qhs  Still has markedly elevation of cholesterol. Her father had a stroke in his late 62s. Her brother had an MI in his 45s. She denies any cardiac or neuro symptoms to suggest either.     Parkinson Disease -   Followed by neurology, doing well on Sinemet. Had OT for the contracture in her R hand and has had improved ROM.    Spinal Stenosis -   With lumbar radicular symptoms. Intermittently taking gabapentin, which she states is helping considerably  She sees Dr. Du Aranda at [address] [place] [address]:  Review of Systems   Constitutional: Negative for chills, fever. Respiratory: Negative for cough, shortness of breath and wheezing. Cardiovascular: Negative for chest pain, palpitations and leg swelling. OBJECTIVE    General:  Alert, cooperative, well appearing, in no apparent distress. Psych: normal affect. Mood good. Oriented x 3. Judgement and insight intact. Results for Pily Morton (MRN [id number]) as of 9/15/2020 09:40   Ref. Range 8/5/2020 09:43   Sodium Latest Ref Range: 134 - 144 mmol/L 140   Potassium Latest Ref Range: 3.5 - 5.2 mmol/L 4.0   Chloride Latest Ref Range: 96 - 106 mmol/L 100   CO2 Latest Ref Range: 20 - 29 mmol/L 25   Glucose Latest Ref Range: 65 - 99 mg/dL 96   BUN Latest Ref Range: 8 - 27 mg/dL 25   Creatinine Latest Ref Range: 0.57 - 1.00 mg/dL 0.93   BUN/Creatinine ratio Latest Ref Range: 12 - 28  27   Calcium Latest Ref Range: 8.7 - 10.3 mg/dL 9.6   GFR est non-AA Latest Ref Range: >59 mL/min/1.73 63   GFR est AA Latest Ref Range: >59 mL/min/1.73 73   Bilirubin, total Latest Ref Range: 0.0 - 1.2 mg/dL 0.6   Protein, total Latest Ref Range: 6.0 - 8.5 g/dL 7.2   Albumin Latest Ref Range: 3.8 - 4.8 g/dL 4.6   A-G Ratio Latest Ref Range: 1.2 - 2.2  1.8   ALT Latest Ref Range: 0 - 32 IU/L 24   AST Latest Ref Range: 0 - 40 IU/L 17   Alk. phosphatase Latest Ref Range: 39 - 117 IU/L 64   Triglyceride Latest Ref Range: 0 - 149 mg/dL 78   Cholesterol, total Latest Ref Range: 100 - 199 mg/dL 284 (H)   HDL Cholesterol Latest Ref Range: >39 mg/dL 72   VLDL, calculated Latest Ref Range: 5 - 40 mg/dL 16   LDL, calculated Latest Ref Range: 0 - 99 mg/dL 196 (H)   Hemoglobin A1c, (calculated) Latest Ref Range: 4.8 - 5.6 % 5.8 (H)       ASSESSMENT / PLAN    ICD-10-CM ICD-9-CM    1. Essential hypertension  I10 401.9 amLODIPine (NORVASC) 10 mg tablet      CANCELED: OCCULT BLOOD IMMUNOASSAY,DIAGNOSTIC   2. Prediabetes  R73.03 790.29    3. Dyslipidemia  E78.5 272.4 REFERRAL TO CARDIOLOGY   4. Parkinson disease ([facility] [address]  G20 332.0    5. Spinal stenosis of lumbar region, unspecified whether neurogenic claudication present  M48.061 724.02    6.  Family history of heart disease  Z82.49 V17.49 REFERRAL TO CARDIOLOGY   7. Colon cancer screening  Z12.11 V76.51 OCCULT BLOOD IMMUNOASSAY,DIAGNOSTIC     Reviewed labs. Essential hypertension  - Stable / controlled  - Meds refilled     Prediabetes  - Historically well-controlled  - Continue to work on diet & exercise  -POC a1c at next OV in 6 months     Dyslipidemia / family history of heart disease  - Both HDL and LDL have been elevated  - Patient is compliant with high dose statin  -consider one time consult with cardiology to discuss if this is good enough and to get recommendations based on family history.    -referral placed.     Parkinson disease ([facility] [address]  - Stable on current dose of Sinemet   - Neurology notes reviewed     Spinal stenosis of lumbar region, unspecified whether neurogenic claudication present  - Cont gabapentin   -refills as needed. - f/u with Dr. Steve Baker     FIT test ordered for CRCS. All chart history elements were reviewed by me at the time of the visit even though marked at time of note closure. Patient understands our medical plan. Patient has provided input and agrees with goals. Alternatives have been explained and offered. All questions answered. The patient is to call if condition worsens or fails to improve.      RTC in 6 months, fasting labs prior

## 2020-09-30 ENCOUNTER — OFFICE VISIT (OUTPATIENT)
Dept: CARDIOLOGY CLINIC | Age: 70
End: 2020-09-30
Payer: MEDICARE

## 2020-09-30 VITALS
HEIGHT: 68 IN | WEIGHT: 138 LBS | DIASTOLIC BLOOD PRESSURE: 80 MMHG | OXYGEN SATURATION: 100 % | SYSTOLIC BLOOD PRESSURE: 122 MMHG | HEART RATE: 65 BPM | BODY MASS INDEX: 20.92 KG/M2

## 2020-09-30 DIAGNOSIS — R06.02 SHORTNESS OF BREATH: ICD-10-CM

## 2020-09-30 DIAGNOSIS — E78.5 DYSLIPIDEMIA: ICD-10-CM

## 2020-09-30 DIAGNOSIS — R06.02 SHORTNESS OF BREATH: Primary | ICD-10-CM

## 2020-09-30 DIAGNOSIS — R60.9 SWELLING: ICD-10-CM

## 2020-09-30 PROCEDURE — G8510 SCR DEP NEG, NO PLAN REQD: HCPCS | Performed by: INTERNAL MEDICINE

## 2020-09-30 PROCEDURE — G8420 CALC BMI NORM PARAMETERS: HCPCS | Performed by: INTERNAL MEDICINE

## 2020-09-30 PROCEDURE — G8427 DOCREV CUR MEDS BY ELIG CLIN: HCPCS | Performed by: INTERNAL MEDICINE

## 2020-09-30 PROCEDURE — 1101F PT FALLS ASSESS-DOCD LE1/YR: CPT | Performed by: INTERNAL MEDICINE

## 2020-09-30 PROCEDURE — 1090F PRES/ABSN URINE INCON ASSESS: CPT | Performed by: INTERNAL MEDICINE

## 2020-09-30 PROCEDURE — G8752 SYS BP LESS 140: HCPCS | Performed by: INTERNAL MEDICINE

## 2020-09-30 PROCEDURE — 93000 ELECTROCARDIOGRAM COMPLETE: CPT | Performed by: INTERNAL MEDICINE

## 2020-09-30 PROCEDURE — 3017F COLORECTAL CA SCREEN DOC REV: CPT | Performed by: INTERNAL MEDICINE

## 2020-09-30 PROCEDURE — G8399 PT W/DXA RESULTS DOCUMENT: HCPCS | Performed by: INTERNAL MEDICINE

## 2020-09-30 PROCEDURE — G9899 SCRN MAM PERF RSLTS DOC: HCPCS | Performed by: INTERNAL MEDICINE

## 2020-09-30 PROCEDURE — 99204 OFFICE O/P NEW MOD 45 MIN: CPT | Performed by: INTERNAL MEDICINE

## 2020-09-30 PROCEDURE — G8536 NO DOC ELDER MAL SCRN: HCPCS | Performed by: INTERNAL MEDICINE

## 2020-09-30 PROCEDURE — G8754 DIAS BP LESS 90: HCPCS | Performed by: INTERNAL MEDICINE

## 2020-09-30 NOTE — PROGRESS NOTES
Silva Echeverria    Chief Complaint   Patient presents with   [address] [place] [address] Maciej New Patient     referred by PCP for dyslipidemia / Gurney Gilford hx if heart disease       HPI    Silva Ecehverria is a 78 y.o. extremely pleasant female with no known coronary disease here for initial evaluation for intermittent palps, MOCTEZUMA, LE edema and +FH for CAD. Pt has parkinsons but as you know is active. She feels sensation of her heart beating when she goes up the stairs, but this is random and not every time she goes up the stairs. It also doesn't happen every time she goes up the stairs. No CP assoc, she has never passed out. There may be some MOCTEZUMA with it. Mostly concerned in setting of her family hx. Can have intermittent mild swelling in her feet/ ankles but not currently. CV RFs; DM2, HTN, HL, +FH    Past Medical History:   Diagnosis Date    Bladder perforation, intraoperative     during hysterectomy    Hyperlipidemia     Hypertension     Osteopenia     Parkinson disease ([facility] [address]     Prediabetes        Past Surgical History:   Procedure Laterality Date    HX  SECTION      HX JEROD AND BSO      LA EXTRAC ERUPTED TOOTH/EXPOSED ROOT         Current Outpatient Medications   Medication Sig Dispense Refill    amLODIPine (NORVASC) 10 mg tablet Take 1 Tab by mouth daily. 90 Tab 1    valsartan-hydroCHLOROthiazide (DIOVAN-HCT) 160-25 mg per tablet TAKE 1 TABLET BY MOUTH DAILY 90 Tab 0    carbidopa-levodopa (SINEMET)  mg per tablet TAKE 1 TABLET at 8 am, 12 noon, and 4 pm 270 Tab 3    gabapentin (NEURONTIN) 100 mg capsule Take 1 Cap by mouth two (2) times a day. Max Daily Amount: 200 mg. (Patient taking differently: Take 100 mg by mouth nightly as needed.) 60 Cap 2    rosuvastatin (CRESTOR) 20 mg tablet Take 1 Tab by mouth nightly. 90 Tab 0    calcium carbonate/vitamin D3 (CALCIUM + D PO) Take  by mouth.  cholecalciferol, VITAMIN D3, (VITAMIN D3) 5,000 unit tab tablet Take  by mouth daily.       cyanocobalamin, vitamin B-12, (VITAMIN B-12 PO) Take  by mouth.          No Known Allergies    Social History     Socioeconomic History    Marital status:      Spouse name: Not on file    Number of children: Not on file    Years of education: Not on file    Highest education level: Not on file   Occupational History    Occupation: Retired   Social Needs    Financial resource strain: Not on file    Food insecurity     Worry: Not on file     Inability: Not on file   [employer] needs     Medical: Not on file     Non-medical: Not on file   Tobacco Use    Smoking status: Never Smoker    Smokeless tobacco: Never Used   Substance and Sexual Activity    Alcohol use: No    Drug use: No    Sexual activity: Yes     Partners: Male     Birth control/protection: None   Lifestyle    Physical activity     Days per week: Not on file     Minutes per session: Not on file    Stress: Not on file   Relationships    Social connections     Talks on phone: Not on file     Gets together: Not on file     Attends Oriental orthodox service: Not on file     Active member of club or organization: Not on file     Attends meetings of clubs or organizations: Not on file     Relationship status: Not on file    Intimate partner violence     Fear of current or ex partner: Not on file     Emotionally abused: Not on file     Physically abused: Not on file     Forced sexual activity: Not on file   Other Topics Concern     Service Not Asked    Blood Transfusions Not Asked    Caffeine Concern Not Asked    Occupational Exposure Not Asked    Hobby Hazards Not Asked    Sleep Concern Not Asked    Stress Concern Not Asked    Weight Concern Not Asked    Special Diet Not Asked    Back Care Not Asked    Exercise Not Asked    Bike Helmet Not Asked   [address] Not Asked    Self-Exams Not Asked   Social History Narrative    Not on file        FH: +premature ASCVD    Review of Systems    14 pt Review of Systems is negative unless otherwise mentioned in the HPI. Wt Readings from Last 3 Encounters:   09/30/20 62.6 kg (138 lb)   04/20/20 64.9 kg (143 lb)   03/09/20 64.9 kg (143 lb)     Temp Readings from Last 3 Encounters:   03/09/20 98.1 °F (36.7 °C) (Oral)   01/16/20 96.4 °F (35.8 °C) (Oral)   01/09/20 96.5 °F (35.8 °C)     BP Readings from Last 3 Encounters:   09/30/20 122/80   04/20/20 146/81   03/09/20 122/80     Pulse Readings from Last 3 Encounters:   09/30/20 65   04/20/20 87   03/09/20 (!) 55            Physical Exam:    Visit Vitals  /80 (BP 1 Location: Left arm, BP Patient Position: Sitting)   Pulse 65   Ht 5' 8\" (1.727 m)   Wt 62.6 kg (138 lb)   SpO2 100%   BMI 20.98 kg/m²      Physical Exam  HENT:      Head: Normocephalic and atraumatic. Eyes:      Pupils: Pupils are equal, round, and reactive to light. Cardiovascular:      Rate and Rhythm: Normal rate and regular rhythm. Heart sounds: Normal heart sounds. No murmur. No friction rub. No gallop. Pulmonary:      Effort: Pulmonary effort is normal. No respiratory distress. Breath sounds: Normal breath sounds. No wheezing or rales. Chest:      Chest wall: No tenderness. Abdominal:      General: Bowel sounds are normal.      Palpations: Abdomen is soft. Musculoskeletal:         General: No tenderness. Skin:     General: Skin is warm and dry. Neurological:      Mental Status: She is alert and oriented to person, place, and time.          EKG today shows: NSR, normal axis and intervals, no ST segment abnormalities    Lab Results   Component Value Date/Time    Sodium 140 08/05/2020 09:43 AM    Potassium 4.0 08/05/2020 09:43 AM    Chloride 100 08/05/2020 09:43 AM    CO2 25 08/05/2020 09:43 AM    Anion gap 6 09/09/2019 10:26 AM    Glucose 96 08/05/2020 09:43 AM    BUN 25 08/05/2020 09:43 AM    Creatinine 0.93 08/05/2020 09:43 AM    BUN/Creatinine ratio 27 08/05/2020 09:43 AM    GFR est AA 73 08/05/2020 09:43 AM    GFR est non-AA 63 08/05/2020 09:43 AM    Calcium 9.6 08/05/2020 09:43 AM    Bilirubin, total 0.6 08/05/2020 09:43 AM    Alk. phosphatase 64 08/05/2020 09:43 AM    Protein, total 7.2 08/05/2020 09:43 AM    Albumin 4.6 08/05/2020 09:43 AM    Globulin 3.6 09/09/2019 10:26 AM    A-G Ratio 1.8 08/05/2020 09:43 AM    ALT (SGPT) 24 08/05/2020 09:43 AM    AST (SGOT) 17 08/05/2020 09:43 AM     Lab Results   Component Value Date/Time    WBC 5.6 01/18/2019 12:15 PM    HGB 13.0 01/18/2019 12:15 PM    HCT 39.3 01/18/2019 12:15 PM    PLATELET 678 22/68/9939 12:15 PM    MCV 91.2 01/18/2019 12:15 PM     No results found for: TSH, TSH2, TSH3, TSHP, TSHEXT  Lab Results   Component Value Date/Time    Cholesterol, total 284 (H) 08/05/2020 09:43 AM    HDL Cholesterol 72 08/05/2020 09:43 AM    LDL, calculated 196 (H) 08/05/2020 09:43 AM    VLDL, calculated 16 08/05/2020 09:43 AM    Triglyceride 78 08/05/2020 09:43 AM    CHOL/HDL Ratio 3.5 09/09/2019 10:26 AM         Impression and Plan:  Christa Harkinsster is a 78 y.o. with:    1.) LE edema, rare, likely mild HFpEF, well compensated  2.) MOCTEZUMA/ some palps with stairs, likely deconditioning, doubt significant CV etiology  3.) HL, on Crestor but LDL not at goal  4.) HTN, well controlled    1.) Echocardiogram, will call with result, if WNL, can see me PRN  2.) Would not pursue heart scan at this time, as already on HI statin    She had several acute complaints- so we focused on this  Her BP, ekg, and CV exam are WNL    In terms of prevention, I wouldn't pursue further risk stratification due to her FH mostly bc wouldn't change therapy  Only major risk modifier I see- is she really taking Crestor 20 mg daily and her LDL is almost 200? If so, would consider addition of Zetia and if still doesn't reduce LDL to <100 (which it wont)- would discuss PCSK9 inhibitor like Repatha if she is willing. Happy to help with this at your discretion.     Thank you for allowing me to participate in the care of your patient, please do not hesitate to call with questions or concerns.     [address],    Trever Arceo, DO

## 2020-09-30 NOTE — PROGRESS NOTES
[facility] presents today for   Chief Complaint   Patient presents with    New Patient     referred by PCP for dyslipidemia / Fam hx if heart disease       [facility] preferred language for health care discussion is english/other. Is someone accompanying this pt? no    Is the patient using any DME equipment during [address]? no    Depression Screening:  3 most recent PHQ Screens 9/30/2020   Little interest or pleasure in doing things Not at all   Feeling down, depressed, irritable, or hopeless Not at all   Total Score PHQ 2 0       Learning Assessment:  Learning Assessment 6/12/2018   PRIMARY LEARNER Patient   HIGHEST LEVEL OF EDUCATION - PRIMARY LEARNER  4 YEARS OF COLLEGE   BARRIERS PRIMARY LEARNER NONE   CO-LEARNER CAREGIVER No   CO-LEARNER NAME -   PRIMARY LANGUAGE ENGLISH   LEARNER PREFERENCE PRIMARY READING   ANSWERED BY patient   RELATIONSHIP SELF       Abuse Screening:  Abuse Screening Questionnaire 9/30/2020   Do you ever feel afraid of your partner? N   Are you in a relationship with someone who physically or mentally threatens you? N   Is it safe for you to go home? Y       Fall Risk  Fall Risk Assessment, last 12 mths 9/30/2020   Able to walk? Yes   Fall in past 12 months? No       Pt currently taking Anticoagulant therapy? no    Coordination of Care:  1. Have you been to the ER, urgent care clinic since your last visit? Hospitalized since your last visit? no    2. Have you seen or consulted any other health care providers outside of the [address] [place] [address] since your last visit? Include any pap smears or colon screening.  no

## 2020-10-19 ENCOUNTER — HOSPITAL ENCOUNTER (OUTPATIENT)
Dept: LAB | Age: 70
Discharge: HOME | End: 2020-10-19
Payer: MEDICARE

## 2020-10-19 DIAGNOSIS — Z12.11 COLON CANCER SCREENING: ICD-10-CM

## 2020-10-19 PROCEDURE — 82274 ASSAY TEST FOR BLOOD FECAL: CPT

## 2020-10-21 LAB
ECHO AO ROOT DIAM: 3.07 CM
ECHO LA AREA 4C: 15.81 CM2
ECHO LA VOL 2C: 42.68 ML (ref 22–52)
ECHO LA VOL 4C: 35.78 ML (ref 22–52)
ECHO LA VOL BP: 42.73 ML (ref 22–52)
ECHO LA VOL/BSA BIPLANE: 24.48 ML/M2 (ref 16–28)
ECHO LA VOLUME INDEX A2C: 24.45 ML/M2 (ref 16–28)
ECHO LA VOLUME INDEX A4C: 20.5 ML/M2 (ref 16–28)
ECHO LV E' LATERAL VELOCITY: 12.47 CM/S
ECHO LV E' SEPTAL VELOCITY: 10.92 CM/S
ECHO LV INTERNAL DIMENSION DIASTOLIC: 4.23 CM (ref 3.9–5.3)
ECHO LV INTERNAL DIMENSION SYSTOLIC: 2.35 CM
ECHO LV IVSD: 0.82 CM (ref 0.6–0.9)
ECHO LV MASS 2D: 100.8 G (ref 67–162)
ECHO LV MASS INDEX 2D: 57.7 G/M2 (ref 43–95)
ECHO LV POSTERIOR WALL DIASTOLIC: 0.76 CM (ref 0.6–0.9)
ECHO LVOT CARDIAC OUTPUT: 3.69 LITER/MINUTE
ECHO LVOT DIAM: 1.89 CM
ECHO LVOT PEAK GRADIENT: 3.2 MMHG
ECHO LVOT PEAK VELOCITY: 89.5 CM/S
ECHO LVOT SV: 59 ML
ECHO LVOT VTI: 21.09 CM
ECHO MV A VELOCITY: 89.43 CM/S
ECHO MV E DECELERATION TIME (DT): 0.2 S
ECHO MV E VELOCITY: 97.21 CM/S
ECHO MV E/A RATIO: 1.09
ECHO MV E/E' LATERAL: 7.8
ECHO MV E/E' RATIO (AVERAGED): 8.35
ECHO MV E/E' SEPTAL: 8.9
ECHO PVEIN A DURATION: 0.11 S
ECHO PVEIN A VELOCITY: 29.18 CM/S
ECHO RV TAPSE: 2.72 CM (ref 1.5–2)
ECHO TV REGURGITANT MAX VELOCITY: 307.61 CM/S
ECHO TV REGURGITANT PEAK GRADIENT: 37.85 MMHG
LVOT MG: 1.76 MMHG

## 2020-10-21 NOTE — PROGRESS NOTES
Per your last note\" Joshua Mariee is a 78 y.o. with:     1.) LE edema, rare, likely mild HFpEF, well compensated  2.) MOCTEZUMA/ some palps with stairs, likely deconditioning, doubt significant CV etiology  3.) HL, on Crestor but LDL not at goal  4.) HTN, well controlled     1.) Echocardiogram, will call with result, if WNL, can see me PRN  2.) Would not pursue heart scan at this time, as already on HI statin     She had several acute complaints- so we focused on this  Her BP, ekg, and CV exam are WNL     In terms of prevention, I wouldn't pursue further risk stratification due to her FH mostly bc wouldn't change therapy  Only major risk modifier I see- is she really taking Crestor 20 mg daily and her LDL is almost 200? If so, would consider addition of Zetia and if still doesn't reduce LDL to <100 (which it wont)- would discuss PCSK9 inhibitor like Repatha if she is willing. Happy to help with this at your discretion.

## 2020-10-24 LAB — HEMOCCULT STL QL IA: NEGATIVE

## 2020-10-26 NOTE — PROGRESS NOTES
Patient identifiers verified. Patient advised that colon cancer screen test was negative. She voices understanding.

## 2020-11-04 ENCOUNTER — TELEPHONE (OUTPATIENT)
Dept: CARDIOLOGY CLINIC | Age: 70
End: 2020-11-04

## 2020-11-04 NOTE — TELEPHONE ENCOUNTER
----- Message from Cynthia Crouch DO sent at 10/21/2020  4:50 PM EDT -----  Please let her know echo looks great  Heart pump normal and fluid status is stable/ normal  ----- Message -----  From: Meliton Aguilar LPN  Sent: 47/68/1518   1:52 PM EDT  To: Cynthia Crouch DO    Per your last note\" Domenica Rodriguez is a 78 y.o. with:     1.) LE edema, rare, likely mild HFpEF, well compensated  2.) MOCTEZUMA/ some palps with stairs, likely deconditioning, doubt significant CV etiology  3.) HL, on Crestor but LDL not at goal  4.) HTN, well controlled     1.) Echocardiogram, will call with result, if WNL, can see me PRN  2.) Would not pursue heart scan at this time, as already on HI statin     She had several acute complaints- so we focused on this  Her BP, ekg, and CV exam are WNL     In terms of prevention, I wouldn't pursue further risk stratification due to her FH mostly bc wouldn't change therapy  Only major risk modifier I see- is she really taking Crestor 20 mg daily and her LDL is almost 200? If so, would consider addition of Zetia and if still doesn't reduce LDL to <100 (which it wont)- would discuss PCSK9 inhibitor like Repatha if she is willing. Happy to help with this at your discretion.

## 2020-11-06 ENCOUNTER — TELEPHONE (OUTPATIENT)
Dept: CARDIOLOGY CLINIC | Age: 70
End: 2020-11-06

## 2020-11-06 NOTE — TELEPHONE ENCOUNTER
----- Message from Roxana Alaniz DO sent at 10/21/2020  4:50 PM EDT -----  Please let her know echo looks great  Heart pump normal and fluid status is stable/ normal  ----- Message -----  From: Winston Katz LPN  Sent: 10/21/5996   1:52 PM EDT  To: Roxana Alaniz DO    Per your last note\" Joao Gil is a 78 y.o. with:     1.) LE edema, rare, likely mild HFpEF, well compensated  2.) MOCTEZUMA/ some palps with stairs, likely deconditioning, doubt significant CV etiology  3.) HL, on Crestor but LDL not at goal  4.) HTN, well controlled     1.) Echocardiogram, will call with result, if WNL, can see me PRN  2.) Would not pursue heart scan at this time, as already on HI statin     She had several acute complaints- so we focused on this  Her BP, ekg, and CV exam are WNL     In terms of prevention, I wouldn't pursue further risk stratification due to her FH mostly bc wouldn't change therapy  Only major risk modifier I see- is she really taking Crestor 20 mg daily and her LDL is almost 200? If so, would consider addition of Zetia and if still doesn't reduce LDL to <100 (which it wont)- would discuss PCSK9 inhibitor like Repatha if she is willing. Happy to help with this at your discretion.

## 2020-11-27 DIAGNOSIS — I10 ESSENTIAL HYPERTENSION WITH GOAL BLOOD PRESSURE LESS THAN 140/90: Chronic | ICD-10-CM

## 2020-11-27 RX ORDER — VALSARTAN AND HYDROCHLOROTHIAZIDE 160; 25 MG/1; MG/1
TABLET ORAL
Qty: 90 TAB | Refills: 0 | Status: SHIPPED | OUTPATIENT
Start: 2020-11-27 | End: 2021-03-01

## 2021-03-01 DIAGNOSIS — I10 ESSENTIAL HYPERTENSION WITH GOAL BLOOD PRESSURE LESS THAN 140/90: Chronic | ICD-10-CM

## 2021-03-01 RX ORDER — VALSARTAN AND HYDROCHLOROTHIAZIDE 160; 25 MG/1; MG/1
TABLET ORAL
Qty: 90 TAB | Refills: 0 | Status: SHIPPED | OUTPATIENT
Start: 2021-03-01 | End: 2021-05-31

## 2021-03-10 DIAGNOSIS — E78.5 DYSLIPIDEMIA: ICD-10-CM

## 2021-03-10 NOTE — TELEPHONE ENCOUNTER
This patient contacted office for the following prescriptions to be filled:    Last office visit: 9/15/20  Follow up appointment: 3/15/21  Medication requested :   Requested Prescriptions     Pending Prescriptions Disp Refills    rosuvastatin (Crestor) 20 mg tablet 90 Tab 0     Sig: Take 1 Tab by mouth nightly.      PCP: rio  Mail order or Local pharmacy name [facility] [phone]

## 2021-03-12 RX ORDER — ROSUVASTATIN CALCIUM 20 MG/1
20 TABLET, COATED ORAL
Qty: 90 TAB | Refills: 0 | Status: SHIPPED | OUTPATIENT
Start: 2021-03-12 | End: 2021-06-08

## 2021-03-15 ENCOUNTER — OFFICE VISIT (OUTPATIENT)
Dept: FAMILY MEDICINE CLINIC | Age: 71
End: 2021-03-15
Payer: MEDICARE

## 2021-03-15 VITALS
SYSTOLIC BLOOD PRESSURE: 136 MMHG | WEIGHT: 143 LBS | BODY MASS INDEX: 21.67 KG/M2 | OXYGEN SATURATION: 99 % | HEIGHT: 68 IN | DIASTOLIC BLOOD PRESSURE: 60 MMHG | TEMPERATURE: 97.3 F | RESPIRATION RATE: 14 BRPM | HEART RATE: 74 BPM

## 2021-03-15 DIAGNOSIS — I10 ESSENTIAL HYPERTENSION WITH GOAL BLOOD PRESSURE LESS THAN 140/90: Primary | ICD-10-CM

## 2021-03-15 DIAGNOSIS — G20 PARKINSON DISEASE (HCC): ICD-10-CM

## 2021-03-15 DIAGNOSIS — R73.03 PREDIABETES: ICD-10-CM

## 2021-03-15 DIAGNOSIS — E78.5 DYSLIPIDEMIA: ICD-10-CM

## 2021-03-15 DIAGNOSIS — M48.061 SPINAL STENOSIS OF LUMBAR REGION, UNSPECIFIED WHETHER NEUROGENIC CLAUDICATION PRESENT: ICD-10-CM

## 2021-03-15 DIAGNOSIS — R73.01 IFG (IMPAIRED FASTING GLUCOSE): ICD-10-CM

## 2021-03-15 LAB — HBA1C MFR BLD HPLC: 5.6 %

## 2021-03-15 PROCEDURE — 1090F PRES/ABSN URINE INCON ASSESS: CPT | Performed by: FAMILY MEDICINE

## 2021-03-15 PROCEDURE — G9899 SCRN MAM PERF RSLTS DOC: HCPCS | Performed by: FAMILY MEDICINE

## 2021-03-15 PROCEDURE — G8752 SYS BP LESS 140: HCPCS | Performed by: FAMILY MEDICINE

## 2021-03-15 PROCEDURE — 83036 HEMOGLOBIN GLYCOSYLATED A1C: CPT | Performed by: FAMILY MEDICINE

## 2021-03-15 PROCEDURE — G8754 DIAS BP LESS 90: HCPCS | Performed by: FAMILY MEDICINE

## 2021-03-15 PROCEDURE — G8427 DOCREV CUR MEDS BY ELIG CLIN: HCPCS | Performed by: FAMILY MEDICINE

## 2021-03-15 PROCEDURE — G8536 NO DOC ELDER MAL SCRN: HCPCS | Performed by: FAMILY MEDICINE

## 2021-03-15 PROCEDURE — G8510 SCR DEP NEG, NO PLAN REQD: HCPCS | Performed by: FAMILY MEDICINE

## 2021-03-15 PROCEDURE — 1101F PT FALLS ASSESS-DOCD LE1/YR: CPT | Performed by: FAMILY MEDICINE

## 2021-03-15 PROCEDURE — 3017F COLORECTAL CA SCREEN DOC REV: CPT | Performed by: FAMILY MEDICINE

## 2021-03-15 PROCEDURE — G8399 PT W/DXA RESULTS DOCUMENT: HCPCS | Performed by: FAMILY MEDICINE

## 2021-03-15 PROCEDURE — 99214 OFFICE O/P EST MOD 30 MIN: CPT | Performed by: FAMILY MEDICINE

## 2021-03-15 PROCEDURE — G8420 CALC BMI NORM PARAMETERS: HCPCS | Performed by: FAMILY MEDICINE

## 2021-03-15 NOTE — PROGRESS NOTES
1. Have you been to the ER, urgent care clinic since your last visit? Hospitalized since your last visit? No    2. Have you seen or consulted any other health care providers outside of the [address] [place] [address] since your last visit? Include any pap smears or colon screening.  No

## 2021-03-15 NOTE — PROGRESS NOTES
SUBJECTIVE  Chief Complaint   Patient presents with    Cholesterol Problem    Other     IFG/prediabetes    Hypertension     Patient presents for follow-up. Hypertension -   BPs at home normotensive as well. Saw cardiology and had a 2D ECHO. Compliant with meds, needs refill of Norvasc.      Pre-DM -   Current A1c 5.6% in office   Patient continues to work on maintaining a healthy weight through diet and regular exercise      Hyperlipidemia -  Reports poor compliance with Crestor 20 mg qhs  Her father had a stroke in his late 62s. Her brother had an MI in his 45s. She denies any cardiac or neuro symptoms to suggest either.     Parkinson Disease -   Followed by neurology, doing well on Sinemet but does have nighttime hallucinations. She has shared this with neurology and they are monitoring.      Spinal Stenosis -   With lumbar radicular symptoms. Intermittently taking gabapentin, which she states is helping considerably  She sees Dr. Yeny Hood at [address] [place] [address]:  Respiratory: Negative for cough, shortness of breath and wheezing. Cardiovascular: Negative for chest pain, palpitations and leg swelling. OBJECTIVE  Blood pressure 136/60, pulse 74, temperature 97.3 °F (36.3 °C), temperature source Temporal, resp. rate 14, height 5' 8\" (1.727 m), weight 143 lb (64.9 kg), SpO2 99 %. General:  Alert, cooperative, well appearing, in no apparent distress. Chest:  Clear, no w/r/r. Heart:  Normal S1S2, RRR, no murmurs. Ext: no swelling  Psych: normal affect. Mood good. Oriented x 3. Judgement and insight intact. Results for orders placed or performed in visit on 03/15/21   AMB POC HEMOGLOBIN A1C   Result Value Ref Range    Hemoglobin A1c (POC) 5.6 %     Results for Tobe Homans (MRN [id number]) as of 9/15/2020 09:40   Ref.  Range 8/5/2020 09:43   Sodium Latest Ref Range: 134 - 144 mmol/L 140   Potassium Latest Ref Range: 3.5 - 5.2 mmol/L 4.0   Chloride Latest Ref Range: 96 - 106 mmol/L 100   CO2 Latest Ref Range: 20 - 29 mmol/L 25   Glucose Latest Ref Range: 65 - 99 mg/dL 96   BUN Latest Ref Range: 8 - 27 mg/dL 25   Creatinine Latest Ref Range: 0.57 - 1.00 mg/dL 0.93   BUN/Creatinine ratio Latest Ref Range: 12 - 28  27   Calcium Latest Ref Range: 8.7 - 10.3 mg/dL 9.6   GFR est non-AA Latest Ref Range: >59 mL/min/1.73 63   GFR est AA Latest Ref Range: >59 mL/min/1.73 73   Bilirubin, total Latest Ref Range: 0.0 - 1.2 mg/dL 0.6   Protein, total Latest Ref Range: 6.0 - 8.5 g/dL 7.2   Albumin Latest Ref Range: 3.8 - 4.8 g/dL 4.6   A-G Ratio Latest Ref Range: 1.2 - 2.2  1.8   ALT Latest Ref Range: 0 - 32 IU/L 24   AST Latest Ref Range: 0 - 40 IU/L 17   Alk. phosphatase Latest Ref Range: 39 - 117 IU/L 64   Triglyceride Latest Ref Range: 0 - 149 mg/dL 78   Cholesterol, total Latest Ref Range: 100 - 199 mg/dL 284 (H)   HDL Cholesterol Latest Ref Range: >39 mg/dL 72   VLDL, calculated Latest Ref Range: 5 - 40 mg/dL 16   LDL, calculated Latest Ref Range: 0 - 99 mg/dL 196 (H)   Hemoglobin A1c, (calculated) Latest Ref Range: 4.8 - 5.6 % 5.8 (H)       ASSESSMENT / PLAN    ICD-10-CM ICD-9-CM    1. Essential hypertension with goal blood pressure less than 140/90  I10 401.9 CBC WITH AUTOMATED DIFF      METABOLIC PANEL, COMPREHENSIVE      LIPID PANEL   2. Dyslipidemia  E78.5 272.4 CBC WITH AUTOMATED DIFF      METABOLIC PANEL, COMPREHENSIVE      LIPID PANEL   3. Prediabetes  R73.03 790.29    4. IFG (impaired fasting glucose)  R73.01 790.21 AMB POC HEMOGLOBIN A1C      HEMOGLOBIN A1C WITH EAG   5. Parkinson disease ([facility] [address]  G20 332.0    6. Spinal stenosis of lumbar region, unspecified whether neurogenic claudication present  M48.061 724.02      Reviewed latest labs.     Essential hypertension  - Stable / controlled  - Meds refilled as needed.  -cont current care  -reviewed cardiology notes / ECHO      Dyslipidemia / family history of heart disease  - Both HDL and LDL have been elevated  - Patient is noncompliant with high dose statin. Encouraged better compliance. Prediabetes / IFG  - Historically well-controlled  - Continue to work on diet & exercise     Parkinson disease ([facility] [address]  - Stable on current dose of Sinemet   - Neurology notes reviewed  -hallucinations likely med side effect, patient to discuss further with neurology     Spinal stenosis of lumbar region, unspecified whether neurogenic claudication present  - Cont gabapentin   -refills as needed. All chart history elements were reviewed by me at the time of the visit even though marked at time of note closure. Patient understands our medical plan. Patient has provided input and agrees with goals. Alternatives have been explained and offered. All questions answered. The patient is to call if condition worsens or fails to improve. Follow-up and Dispositions    · Return in about 6 months (around 9/15/2021) for routine care and Medicare Wellness exam. Fasting labs due 3-7 days prior to appointment.

## 2021-03-18 ENCOUNTER — HOSPITAL ENCOUNTER (EMERGENCY)
Age: 71
Discharge: HOME | End: 2021-03-18
Attending: EMERGENCY MEDICINE
Payer: MEDICARE

## 2021-03-18 VITALS
HEIGHT: 68 IN | HEART RATE: 84 BPM | BODY MASS INDEX: 21.67 KG/M2 | WEIGHT: 143 LBS | SYSTOLIC BLOOD PRESSURE: 109 MMHG | TEMPERATURE: 98.5 F | OXYGEN SATURATION: 100 % | RESPIRATION RATE: 17 BRPM | DIASTOLIC BLOOD PRESSURE: 65 MMHG

## 2021-03-18 DIAGNOSIS — R53.83 MALAISE AND FATIGUE: Primary | ICD-10-CM

## 2021-03-18 DIAGNOSIS — R53.81 MALAISE AND FATIGUE: Primary | ICD-10-CM

## 2021-03-18 LAB
ANION GAP SERPL CALC-SCNC: 8 MMOL/L (ref 3–18)
BASOPHILS # BLD: 0 K/UL (ref 0–0.1)
BASOPHILS NFR BLD: 0 % (ref 0–2)
BUN SERPL-MCNC: 16 MG/DL (ref 7–18)
BUN/CREAT SERPL: 16 (ref 12–20)
CALCIUM SERPL-MCNC: 9.5 MG/DL (ref 8.5–10.1)
CHLORIDE SERPL-SCNC: 99 MMOL/L (ref 100–111)
CO2 SERPL-SCNC: 29 MMOL/L (ref 21–32)
CREAT SERPL-MCNC: 1.02 MG/DL (ref 0.6–1.3)
DIFFERENTIAL METHOD BLD: ABNORMAL
EOSINOPHIL # BLD: 0.2 K/UL (ref 0–0.4)
EOSINOPHIL NFR BLD: 3 % (ref 0–5)
ERYTHROCYTE [DISTWIDTH] IN BLOOD BY AUTOMATED COUNT: 13.1 % (ref 11.6–14.5)
GLUCOSE SERPL-MCNC: 102 MG/DL (ref 74–99)
HCT VFR BLD AUTO: 35.9 % (ref 35–45)
HGB BLD-MCNC: 12.1 G/DL (ref 12–16)
LYMPHOCYTES # BLD: 1 K/UL (ref 0.9–3.6)
LYMPHOCYTES NFR BLD: 19 % (ref 21–52)
MCH RBC QN AUTO: 31.2 PG (ref 24–34)
MCHC RBC AUTO-ENTMCNC: 33.7 G/DL (ref 31–37)
MCV RBC AUTO: 92.5 FL (ref 74–97)
MONOCYTES # BLD: 0.5 K/UL (ref 0.05–1.2)
MONOCYTES NFR BLD: 10 % (ref 3–10)
NEUTS SEG # BLD: 3.5 K/UL (ref 1.8–8)
NEUTS SEG NFR BLD: 68 % (ref 40–73)
PLATELET # BLD AUTO: 165 K/UL (ref 135–420)
PMV BLD AUTO: 11.3 FL (ref 9.2–11.8)
POTASSIUM SERPL-SCNC: 3.3 MMOL/L (ref 3.5–5.5)
RBC # BLD AUTO: 3.88 M/UL (ref 4.2–5.3)
SODIUM SERPL-SCNC: 136 MMOL/L (ref 136–145)
WBC # BLD AUTO: 5.1 K/UL (ref 4.6–13.2)

## 2021-03-18 PROCEDURE — 80048 BASIC METABOLIC PNL TOTAL CA: CPT

## 2021-03-18 PROCEDURE — 85025 COMPLETE CBC W/AUTO DIFF WBC: CPT

## 2021-03-18 PROCEDURE — 99282 EMERGENCY DEPT VISIT SF MDM: CPT

## 2021-03-18 NOTE — ED PROVIDER NOTES
HPI patient says she received her second Covid vaccine shot 2 days ago. She says initially she felt fine. She says later that night around midnight she woke up to go the bathroom and found it difficult if not impossible to walk. She says she had to have her  help her to the bathroom and then come back. She also felt \"weak in my knees\". She says this weakness symptoms lasted for about 12 to 18 hours and gradually resolved. At the present time, patient says she feels fine and the difficulty walking has resolved. Patient called her primary care physician yesterday and reviewed the symptoms with them and they referred her to the emergency room \"to be checked out\". At the present time, she denies any fever chills chest pain shortness of breath or abdominal pain. No other complaints given at this time.     Past Medical History:   Diagnosis Date    Bladder perforation, intraoperative     during hysterectomy    Hyperlipidemia     Hypertension     Osteopenia     Parkinson disease ([facility] [address]     Prediabetes        Past Surgical History:   Procedure Laterality Date    HX  SECTION      HX JEROD AND BSO      AR EXTRAC ERUPTED TOOTH/EXPOSED ROOT           Family History:   Problem Relation Age of Onset    Hypertension Mother     Hypertension Father     Hypertension Brother     Hypertension Brother     Kidney Disease Brother        Social History     Socioeconomic History    Marital status:      Spouse name: Not on file    Number of children: Not on file    Years of education: Not on file    Highest education level: Not on file   Occupational History    Occupation: Retired   Social Needs    Financial resource strain: Not on file    Food insecurity     Worry: Not on file     Inability: Not on file   [employer] needs     Medical: Not on file     Non-medical: Not on file   Tobacco Use    Smoking status: Never Smoker    Smokeless tobacco: Never Used   Substance and Sexual Activity   • Alcohol use: No   • Drug use: No   • Sexual activity: Yes     Partners: Male     Birth control/protection: None   Lifestyle   • Physical activity     Days per week: Not on file     Minutes per session: Not on file   • Stress: Not on file   Relationships   • Social connections     Talks on phone: Not on file     Gets together: Not on file     Attends [employer] service: Not on file     Active member of club or organization: Not on file     Attends meetings of clubs or organizations: Not on file     Relationship status: Not on file   • Intimate partner violence     Fear of current or ex partner: Not on file     Emotionally abused: Not on file     Physically abused: Not on file     Forced sexual activity: Not on file   Other Topics Concern   •  Service Not Asked   • Blood Transfusions Not Asked   • Caffeine Concern Not Asked   • Occupational Exposure Not Asked   • Hobby Hazards Not Asked   • Sleep Concern Not Asked   • Stress Concern Not Asked   • Weight Concern Not Asked   • Special Diet Not Asked   • Back Care Not Asked   • Exercise Not Asked   • Bike Helmet Not Asked   • Seat Belt Not Asked   • Self-Exams Not Asked   Social History Narrative   • Not on file         ALLERGIES: Patient has no known allergies.    Review of Systems   Constitutional: Negative.    HENT: Negative.    Eyes: Negative.    Respiratory: Negative.    Cardiovascular: Negative.    Gastrointestinal: Negative.    Genitourinary: Negative.    Musculoskeletal: Positive for gait problem.   Skin: Negative.    Psychiatric/Behavioral: Negative.        Vitals:    03/18/21 0855   BP: 109/65   Pulse: 84   Resp: 17   Temp: 98.5 °F (36.9 °C)   SpO2: 100%   Weight: 64.9 kg (143 lb)   Height: 5' 8\" (1.727 m)            Physical Exam  Vitals signs and nursing note reviewed.   Constitutional:       Appearance: She is well-developed.   HENT:      Head: Normocephalic and atraumatic.      Nose: Nose normal.      Mouth/Throat:      Mouth: Mucous  membranes are moist.   Eyes:      Conjunctiva/sclera: Conjunctivae normal.      Pupils: Pupils are equal, round, and reactive to light. Neck:      Musculoskeletal: Normal range of motion and neck supple. Cardiovascular:      Rate and Rhythm: Normal rate and regular rhythm. Pulmonary:      Effort: Pulmonary effort is normal.      Breath sounds: Normal breath sounds. Abdominal:      Palpations: Abdomen is soft. Musculoskeletal: Normal range of motion. Skin:     General: Skin is warm and dry. Neurological:      Mental Status: She is alert and oriented to person, place, and time.    Psychiatric:         Mood and Affect: Mood normal.          MDM       Procedures

## 2021-03-18 NOTE — ED NOTES
Keily Woods is a 78 y.o. female that was discharged in stable condition. The patients diagnosis, condition and treatment were explained to  patient and aftercare instructions were given. The patient verbalized understanding. Patient armband removed and shredded.

## 2021-03-18 NOTE — ED TRIAGE NOTES
Pt arrives here with c/o having DIFFICULTY WALKING after having taking her [address] Ext ON 3/16/21    Patient she states night of 16th she woke up and could not walk. Patient is able to ambulate in triage bay. Patient has hx of parkinson's.

## 2021-03-22 ENCOUNTER — TELEPHONE (OUTPATIENT)
Dept: FAMILY MEDICINE CLINIC | Age: 71
End: 2021-03-22

## 2021-03-22 NOTE — TELEPHONE ENCOUNTER
Please advise her to call neurology since this can be a feature of her Parkinson's. Otherwise allow time for congestion to get better.

## 2021-03-22 NOTE — TELEPHONE ENCOUNTER
Patient identifiers verified. Patient advised to call neurology since this can be a feature of her Parkinson's. Otherwise allow time for congestion to get better. Patient voices understanding.

## 2021-03-22 NOTE — TELEPHONE ENCOUNTER
Spoke with patient. She received Covid vaccine # 2 on 3/16/2021 and began experiencing difficulty with ambulation. Patient contacted [facility] and was told by a nurse to be seen at ED. Patient followed advice and was evaluated at [address] Patient states labs were drawn and she was told that they were \"normal\". Per patient she has been having different symptoms after receiving the vaccine. She reports congestion x 2 days ago, pain in right leg yesterday (has resolved), and the difficulty with ambulation remains. Per patient she is \"shuffling\" around.

## 2021-03-22 NOTE — TELEPHONE ENCOUNTER
Pt states she called on Friday with a reaction from her covid vaccine and was advised to go to the ED. Pt states she was told to follow up with the nurse on Monday after her visit. Pt would like a call back at your earliest conveinence. Please advise.

## 2021-03-24 ENCOUNTER — OFFICE VISIT (OUTPATIENT)
Dept: NEUROLOGY | Age: 71
End: 2021-03-24
Payer: MEDICARE

## 2021-03-24 VITALS
WEIGHT: 140 LBS | HEIGHT: 68 IN | BODY MASS INDEX: 21.22 KG/M2 | TEMPERATURE: 96.7 F | OXYGEN SATURATION: 99 % | RESPIRATION RATE: 18 BRPM | SYSTOLIC BLOOD PRESSURE: 108 MMHG | DIASTOLIC BLOOD PRESSURE: 60 MMHG | HEART RATE: 75 BPM

## 2021-03-24 DIAGNOSIS — G20 PARKINSON DISEASE (HCC): Primary | ICD-10-CM

## 2021-03-24 PROCEDURE — 99214 OFFICE O/P EST MOD 30 MIN: CPT | Performed by: NURSE PRACTITIONER

## 2021-03-24 RX ORDER — CARBIDOPA AND LEVODOPA 25; 100 MG/1; MG/1
TABLET ORAL
Qty: 405 TAB | Refills: 1 | Status: SHIPPED | OUTPATIENT
Start: 2021-03-24 | End: 2021-06-14 | Stop reason: SDUPTHER

## 2021-03-24 NOTE — PROGRESS NOTES
[facility]  [address], [address], [place], [address]  [address], [address]  Office:  [phone]  Fax: [phone]  Chief Complaint   Patient presents with    Tremors     follow up     This is a 68-year-old female who presents for follow-up Parkinson disease. Last seen in 2020. In the interim endorses doing quite well. She endorsed some trouble with gait and weakness after receiving her second Materna vaccine. This was on . She presented to the emergency room due to this weakness. Symptoms of weakness started to resolve the next day around 7 AM.  Onset of weakness was approximately 2 AM when she woke up in the middle of the night. Maintained on Sinemet 1 tablet 3 times a day. Dosing at 8am, 12pm, and 4pm.  Denies extra movements, worsening tremor, shuffling, hallucinations, or fall. Does endorse reduced arm swing. No other concerns at this time. Past Medical History:   Diagnosis Date    Bladder perforation, intraoperative     during hysterectomy    Hyperlipidemia     Hypertension     Osteopenia     Parkinson disease ([facility] [address]     Prediabetes        Past Surgical History:   Procedure Laterality Date    HX  SECTION      HX JEROD AND BSO      ND EXTRAC ERUPTED TOOTH/EXPOSED ROOT         Current Outpatient Medications   Medication Sig Dispense Refill    carbidopa-levodopa (SINEMET)  mg per tablet TAKE 1.5 TABLETS at 8 am, 12 noon, and 4 pm 405 Tab 1    rosuvastatin (Crestor) 20 mg tablet Take 1 Tab by mouth nightly. 90 Tab 0    valsartan-hydroCHLOROthiazide (DIOVAN-HCT) 160-25 mg per tablet TAKE 1 TABLET BY MOUTH DAILY 90 Tab 0    amLODIPine (NORVASC) 10 mg tablet Take 1 Tab by mouth daily. 90 Tab 1    gabapentin (NEURONTIN) 100 mg capsule Take 1 Cap by mouth two (2) times a day. Max Daily Amount: 200 mg.  (Patient taking differently: Take 100 mg by mouth nightly as needed.) 60 Cap 2    calcium carbonate/vitamin D3 (CALCIUM + D PO) Take  by mouth.  cholecalciferol, VITAMIN D3, (VITAMIN D3) 5,000 unit tab tablet Take  by mouth daily.  cyanocobalamin, vitamin B-12, (VITAMIN B-12 PO) Take  by mouth. No Known Allergies    Social History     Tobacco Use    Smoking status: Never Smoker    Smokeless tobacco: Never Used   Substance Use Topics    Alcohol use: No    Drug use: No       Family History   Problem Relation Age of Onset    Hypertension Mother     Hypertension Father     Hypertension Brother     Hypertension Brother     Kidney Disease Brother        Review of Systems  GENERAL: Denies fever or fatigue  CARDIAC: No CP or SOB  PULMONARY: No cough of SOB  MUSCULOSKELETAL: No new joint pain  NEURO: SEE HPI    Examination  Visit Vitals  /60 (BP 1 Location: Left upper arm, BP Patient Position: Sitting, BP Cuff Size: Adult)   Pulse 75   Temp (!) 96.7 °F (35.9 °C) (Temporal)   Resp 18   Ht 5' 8\" (1.727 m)   Wt 63.5 kg (140 lb)   SpO2 99%   BMI 21.29 kg/m²       This is a very pleasant 72-year-old female. She is alert and in no apparent distress. Full fund of knowledge. Speech is clear. Mild masking. Extra ocular movements intact. Tongue midline. Equal shoulder shrug. Rest tremor to right hand. Finger-nose-finger intact. Mild bilateral cogwheeling. Steady gait. Reduced arm swing. No shuffling or festination. No postural instability. Impression/Plan  Alice Knowles is a 78 y.o. female whose history and physical are consistent with Parkinson disease. Presence of right arm rest tremor and reduced arm swing on ambulation. No festination, shuffling, bradykinesia, or hallucinations. Has been maintained on Sinemet 1 tablet 3 times daily. Will increase to 1-1/2 tablets 3 times daily. Maintaining 8 AM, 12 noon, and 4 PM dosing schedule. We will follow-up in 3 months or sooner as needed. All questions addressed and patient is agreeable plan of care.     Diagnoses and all orders for this visit:    1. Parkinson disease ([facility] [address]  -     carbidopa-levodopa (SINEMET)  mg per tablet; TAKE 1.5 TABLETS at 8 am, 12 noon, and 4 pm        Signed By: Orville Treviño NP    This note will not be viewable in [address] PLEASE NOTE:   Portions of this document may have been produced using voice recognition software. Unrecognized errors in transcription may be present.

## 2021-03-24 NOTE — PROGRESS NOTES
Navneet Mcmillan presents today for   Chief Complaint   Patient presents with    Tremors     follow up       Is someone accompanying this pt? no    Is the patient using any DME equipment during [address]? no    Depression Screening:  3 most recent PHQ Screens 3/15/2021   Little interest or pleasure in doing things Not at all   Feeling down, depressed, irritable, or hopeless Not at all   Total Score PHQ 2 0       Learning Assessment:  Learning Assessment 6/12/2018   PRIMARY LEARNER Patient   HIGHEST LEVEL OF EDUCATION - PRIMARY LEARNER  4 YEARS OF COLLEGE   BARRIERS PRIMARY LEARNER NONE   CO-LEARNER CAREGIVER No   CO-LEARNER NAME -   PRIMARY LANGUAGE ENGLISH   LEARNER PREFERENCE PRIMARY READING   ANSWERED BY patient   RELATIONSHIP SELF       Abuse Screening:  Abuse Screening Questionnaire 3/15/2021   Do you ever feel afraid of your partner? N   Are you in a relationship with someone who physically or mentally threatens you? N   Is it safe for you to go home? Y       Fall Risk  Fall Risk Assessment, last 12 mths 3/15/2021   Able to walk? Yes   Fall in past 12 months? 0   Do you feel unsteady? 0   Are you worried about falling 0         Coordination of Care:  1. Have you been to the ER, urgent care clinic since your last visit? Hospitalized since your last visit? HV    2. Have you seen or consulted any other health care providers outside of the [address] [place] [address] since your last visit? Include any pap smears or colon screening.  no

## 2021-05-21 DIAGNOSIS — I10 ESSENTIAL HYPERTENSION: ICD-10-CM

## 2021-05-21 RX ORDER — AMLODIPINE BESYLATE 10 MG/1
10 TABLET ORAL DAILY
Qty: 90 TABLET | Refills: 0 | Status: SHIPPED | OUTPATIENT
Start: 2021-05-21 | End: 2021-09-13

## 2021-05-21 NOTE — TELEPHONE ENCOUNTER
Spoke with patient. She was advised that medications have been sent to the pharmacy and f/up has been scheduled for 9/14/2021.

## 2021-05-21 NOTE — TELEPHONE ENCOUNTER
This patient contacted office for the following prescriptions to be filled:    Last office visit: 3/22/21  Follow up appointment: n/a  Medication requested :   Requested Prescriptions     Pending Prescriptions Disp Refills    amLODIPine (NORVASC) 10 mg tablet 90 Tablet 1     Sig: Take 1 Tablet by mouth daily.      PCP: rio  Mail order or Local pharmacy name [facility] [phone]

## 2021-05-24 NOTE — TELEPHONE ENCOUNTER
Returned call to patient, verified Name/, informed patient of message below per NP Shira. Patient verbalized agreement/understanding. No further action required at this time. Mohs Method Verbiage: An incision at a 45-90 degree angle (based on the case) following the standard Mohs approach was done and the specimen was harvested as a microscopic controlled layer.

## 2021-05-31 DIAGNOSIS — I10 ESSENTIAL HYPERTENSION WITH GOAL BLOOD PRESSURE LESS THAN 140/90: Chronic | ICD-10-CM

## 2021-05-31 RX ORDER — VALSARTAN AND HYDROCHLOROTHIAZIDE 160; 25 MG/1; MG/1
TABLET ORAL
Qty: 90 TABLET | Refills: 0 | Status: SHIPPED | OUTPATIENT
Start: 2021-05-31 | End: 2021-09-13 | Stop reason: SDUPTHER

## 2021-06-07 DIAGNOSIS — E78.5 DYSLIPIDEMIA: ICD-10-CM

## 2021-06-08 RX ORDER — ROSUVASTATIN CALCIUM 20 MG/1
TABLET, COATED ORAL
Qty: 90 TABLET | Refills: 0 | Status: SHIPPED | OUTPATIENT
Start: 2021-06-08 | End: 2021-09-08

## 2021-06-14 ENCOUNTER — VIRTUAL VISIT (OUTPATIENT)
Dept: NEUROLOGY | Age: 71
End: 2021-06-14
Payer: MEDICARE

## 2021-06-14 DIAGNOSIS — G20 PARKINSON DISEASE (HCC): Primary | ICD-10-CM

## 2021-06-14 PROCEDURE — 99214 OFFICE O/P EST MOD 30 MIN: CPT | Performed by: NURSE PRACTITIONER

## 2021-06-14 RX ORDER — CARBIDOPA AND LEVODOPA 25; 100 MG/1; MG/1
TABLET ORAL
Qty: 405 TABLET | Refills: 1 | Status: SHIPPED | OUTPATIENT
Start: 2021-06-14 | End: 2022-10-05 | Stop reason: SDUPTHER

## 2021-06-14 NOTE — PROGRESS NOTES
Camrongato Kimball presents today for   Chief Complaint   Patient presents with    Tremors       Is someone accompanying this pt? Virtual visit [phone]    Is the patient using any DME equipment during [address]? no    Depression Screening:  3 most recent PHQ Screens 3/15/2021   Little interest or pleasure in doing things Not at all   Feeling down, depressed, irritable, or hopeless Not at all   Total Score PHQ 2 0       Learning Assessment:  Learning Assessment 6/12/2018   PRIMARY LEARNER Patient   HIGHEST LEVEL OF EDUCATION - PRIMARY LEARNER  4 YEARS OF COLLEGE   BARRIERS PRIMARY LEARNER NONE   CO-LEARNER CAREGIVER No   CO-LEARNER NAME -   PRIMARY LANGUAGE ENGLISH   LEARNER PREFERENCE PRIMARY READING   ANSWERED BY patient   RELATIONSHIP SELF       Abuse Screening:  Abuse Screening Questionnaire 3/15/2021   Do you ever feel afraid of your partner? N   Are you in a relationship with someone who physically or mentally threatens you? N   Is it safe for you to go home? Y       Fall Risk  Fall Risk Assessment, last 12 mths 3/15/2021   Able to walk? Yes   Fall in past 12 months? 0   Do you feel unsteady? 0   Are you worried about falling 0         Coordination of Care:  1. Have you been to the ER, urgent care clinic since your last visit? Hospitalized since your last visit? no    2. Have you seen or consulted any other health care providers outside of the [address] [place] [address] since your last visit? Include any pap smears or colon screening.  no

## 2021-06-14 NOTE — PROGRESS NOTES
Yareli Adams is a 73 y.o. female who was seen by synchronous (real-time) audio-video technology on 6/14/2021 for Tremors        Assessment & Plan:   Diagnoses and all orders for this visit:    1. Parkinson disease ([facility]        Follow-up Parkinson's disease. In the interim patient was increased to Sinemet 1-1/2 tabs 3 times daily. Tolerating this well. Endorses tremor is generally well controlled to the right hand except in stress related circumstances. Denies overt hallucinations. Denies trouble with her gait or falls. We will maintain at current dose of Sinemet and follow-up in 6 months or sooner as needed. All questions addressed and patient is agreeable with plan of care. 712  Subjective: This is a 72-year-old female who presents for follow-up Parkinson's disease. Last seen in office in March. In the interim endorses improvement in right hand tremor. Can occur when she is stressed. Denies trouble with gait or falls. Denies hallucinations. Said she can see white outlines of images such as the lamp in the evening. This is not scary or bothersome to her. Maintained on Sinemet 1-1/2 tablet 3 times daily. She keeps active doing at home physical therapy exercises that she learned at physical therapy. No other concerns at this time. Prior to Admission medications    Medication Sig Start Date End Date Taking? Authorizing Provider   rosuvastatin (CRESTOR) 20 mg tablet TAKE 1 TABLET BY MOUTH EVERY NIGHT 6/8/21  Yes Denis Scott MD   valsartan-hydroCHLOROthiazide (DIOVAN-HCT) 160-25 mg per tablet TAKE 1 TABLET BY MOUTH DAILY 5/31/21  Yes Denis Scott MD   amLODIPine (NORVASC) 10 mg tablet Take 1 Tablet by mouth daily. 5/21/21  Yes Denis Scott MD   carbidopa-levodopa (SINEMET)  mg per tablet TAKE 1.5 TABLETS at 8 am, 12 noon, and 4 pm 3/24/21  Yes Kamilla Montano NP   calcium carbonate/vitamin D3 (CALCIUM + D PO) Take  by mouth.    Yes Provider, Historical   cholecalciferol, VITAMIN D3, (VITAMIN D3) 5,000 unit tab tablet Take  by mouth daily. Yes Provider, Historical   cyanocobalamin, vitamin B-12, (VITAMIN B-12 PO) Take  by mouth. Yes Provider, Historical   gabapentin (NEURONTIN) 100 mg capsule Take 1 Cap by mouth two (2) times a day. Max Daily Amount: 200 mg. Patient not taking: Reported on 6/14/2021 3/16/20   JEANNE Kim     Patient Active Problem List   Diagnosis Code    Essential hypertension with goal blood pressure less than 140/90 I10    Osteopenia M85.80    Dyslipidemia E78.5    Prediabetes R73.03    Frequent urination at night R35.1    Leg heaviness R29.898    Parkinson disease ([facility] G20     Patient Active Problem List    Diagnosis Date Noted    Parkinson disease ([facility] 06/19/2018    Osteopenia 06/12/2018    Dyslipidemia 06/12/2018    Prediabetes 06/12/2018    Frequent urination at night 06/12/2018    Leg heaviness 06/12/2018    Essential hypertension with goal blood pressure less than 140/90 05/31/2016     Current Outpatient Medications   Medication Sig Dispense Refill    rosuvastatin (CRESTOR) 20 mg tablet TAKE 1 TABLET BY MOUTH EVERY NIGHT 90 Tablet 0    valsartan-hydroCHLOROthiazide (DIOVAN-HCT) 160-25 mg per tablet TAKE 1 TABLET BY MOUTH DAILY 90 Tablet 0    amLODIPine (NORVASC) 10 mg tablet Take 1 Tablet by mouth daily. 90 Tablet 0    carbidopa-levodopa (SINEMET)  mg per tablet TAKE 1.5 TABLETS at 8 am, 12 noon, and 4 pm 405 Tab 1    calcium carbonate/vitamin D3 (CALCIUM + D PO) Take  by mouth.  cholecalciferol, VITAMIN D3, (VITAMIN D3) 5,000 unit tab tablet Take  by mouth daily.  cyanocobalamin, vitamin B-12, (VITAMIN B-12 PO) Take  by mouth.  gabapentin (NEURONTIN) 100 mg capsule Take 1 Cap by mouth two (2) times a day. Max Daily Amount: 200 mg.  (Patient not taking: Reported on 6/14/2021) 60 Cap 2     No Known Allergies  Past Medical History:   Diagnosis Date    Bladder perforation, intraoperative     during hysterectomy    Hyperlipidemia     Hypertension 1990    Osteopenia     Parkinson disease ([facility]     Prediabetes      Past Surgical History:   Procedure Laterality Date    HX  SECTION      HX JEROD AND BSO      CA EXTRAC ERUPTED TOOTH/EXPOSED ROOT       Family History   Problem Relation Age of Onset    Hypertension Mother     Hypertension Father     Hypertension Brother     Hypertension Brother     Kidney Disease Brother      Social History     Tobacco Use    Smoking status: Never Smoker    Smokeless tobacco: Never Used   Substance Use Topics    Alcohol use: No       Review of Systems  GENERAL: Denies fever or fatigue  CARDIAC: No CP or SOB  PULMONARY: No cough of SOB  MUSCULOSKELETAL: No new joint pain  NEURO: SEE HPI      Objective:     Patient-Reported Vitals 2021   Patient-Reported Weight 147.6lb   Patient-Reported Pulse 78   Patient-Reported Temperature 97.6   Patient-Reported Systolic  337   Patient-Reported Diastolic 76      General: alert, cooperative, no distress   Mental  status: normal mood, behavior, speech, dress, motor activity, and thought processes, able to follow commands   HENT: NCAT   Neck: no visualized mass   Resp: no respiratory distress   Neuro: no gross deficits   Skin: no discoloration or lesions of concern on visible areas   Psychiatric: normal affect, consistent with stated mood, no evidence of hallucinations     Additional exam findings: This is a very pleasant 73-year-old female. She is alert and in no apparent distress. No facial asymmetry. Masked facies. No observed rest tremor to the right hand. Ambulates steady and able to tandem walk. No festination or shuffling. We discussed the expected course, resolution and complications of the diagnosis(es) in detail. Medication risks, benefits, costs, interactions, and alternatives were discussed as indicated. I advised her to contact the office if her condition worsens, changes or fails to improve as anticipated.  She expressed understanding with the diagnosis(es) and plan. Ravindra Trejo, was evaluated through a synchronous (real-time) audio-video encounter. The patient (or guardian if applicable) is aware that this is a billable service. Verbal consent to proceed has been obtained within the past 12 months. The visit was conducted pursuant to the emergency declaration under the [address] [place] [address] and the [employer]. Patient identification was verified, and a caregiver was present when appropriate. The patient was located in a state where the provider was credentialed to provide care.     Henry Lopez NP

## 2021-07-29 ENCOUNTER — TRANSCRIBE ORDER (OUTPATIENT)
Dept: SCHEDULING | Age: 71
End: 2021-07-29

## 2021-07-29 DIAGNOSIS — Z12.31 VISIT FOR SCREENING MAMMOGRAM: Primary | ICD-10-CM

## 2021-08-09 ENCOUNTER — HOSPITAL ENCOUNTER (OUTPATIENT)
Dept: MAMMOGRAPHY | Age: 71
Discharge: HOME | End: 2021-08-09
Attending: FAMILY MEDICINE
Payer: MEDICARE

## 2021-08-09 DIAGNOSIS — Z12.31 VISIT FOR SCREENING MAMMOGRAM: ICD-10-CM

## 2021-08-09 PROCEDURE — 77063 BREAST TOMOSYNTHESIS BI: CPT

## 2021-08-11 ENCOUNTER — TELEPHONE (OUTPATIENT)
Dept: FAMILY MEDICINE CLINIC | Age: 71
End: 2021-08-11

## 2021-08-11 NOTE — TELEPHONE ENCOUNTER
Patient identifiers verified. Patient advised of normal mammogram-repeat in 1 year. She voices understanding.

## 2021-09-08 DIAGNOSIS — E78.5 DYSLIPIDEMIA: ICD-10-CM

## 2021-09-08 RX ORDER — ROSUVASTATIN CALCIUM 20 MG/1
TABLET, COATED ORAL
Qty: 90 TABLET | Refills: 0 | Status: SHIPPED | OUTPATIENT
Start: 2021-09-08 | End: 2021-12-08

## 2021-09-12 DIAGNOSIS — I10 ESSENTIAL HYPERTENSION WITH GOAL BLOOD PRESSURE LESS THAN 140/90: Chronic | ICD-10-CM

## 2021-09-12 DIAGNOSIS — I10 ESSENTIAL HYPERTENSION: ICD-10-CM

## 2021-09-13 RX ORDER — VALSARTAN AND HYDROCHLOROTHIAZIDE 160; 25 MG/1; MG/1
1 TABLET ORAL DAILY
Qty: 30 TABLET | Refills: 0 | Status: SHIPPED | OUTPATIENT
Start: 2021-09-13 | End: 2021-10-12

## 2021-09-13 RX ORDER — AMLODIPINE BESYLATE 10 MG/1
10 TABLET ORAL DAILY
Qty: 30 TABLET | Refills: 0 | Status: SHIPPED | OUTPATIENT
Start: 2021-09-13 | End: 2021-10-12

## 2021-09-13 NOTE — TELEPHONE ENCOUNTER
.This pharmacy faxed over request for the following prescriptions to be filled:    Medication requested :   Requested Prescriptions     Pending Prescriptions Disp Refills    amLODIPine (NORVASC) 10 mg tablet [Pharmacy Med Name: AMLODIPINE BESYLATE 10MG TABLETS] 90 Tablet 0     Sig: TAKE 1 TABLET BY MOUTH DAILY    valsartan-hydroCHLOROthiazide (DIOVAN-HCT) 160-25 mg per tablet 90 Tablet 0     Sig: Take 1 Tablet by mouth daily.      PCP: Jeromy Howell 39. or Print: [facility]   Mail order or Local pharmacy [address]    Scheduled appointment if not seen by current providers in office: LOV 3/15/2021 f/u 10/19/2021

## 2021-10-12 DIAGNOSIS — I10 ESSENTIAL HYPERTENSION: ICD-10-CM

## 2021-10-12 DIAGNOSIS — I10 ESSENTIAL HYPERTENSION WITH GOAL BLOOD PRESSURE LESS THAN 140/90: Chronic | ICD-10-CM

## 2021-10-12 RX ORDER — VALSARTAN AND HYDROCHLOROTHIAZIDE 160; 25 MG/1; MG/1
1 TABLET ORAL DAILY
Qty: 90 TABLET | Refills: 0 | Status: SHIPPED | OUTPATIENT
Start: 2021-10-12 | End: 2022-01-10

## 2021-10-12 RX ORDER — AMLODIPINE BESYLATE 10 MG/1
10 TABLET ORAL DAILY
Qty: 90 TABLET | Refills: 0 | Status: SHIPPED | OUTPATIENT
Start: 2021-10-12 | End: 2022-01-10

## 2021-10-14 ENCOUNTER — HOSPITAL ENCOUNTER (OUTPATIENT)
Dept: LAB | Age: 71
Discharge: HOME | End: 2021-10-14
Payer: MEDICARE

## 2021-10-14 DIAGNOSIS — E78.5 DYSLIPIDEMIA: ICD-10-CM

## 2021-10-14 DIAGNOSIS — R73.01 IFG (IMPAIRED FASTING GLUCOSE): ICD-10-CM

## 2021-10-14 DIAGNOSIS — I10 ESSENTIAL HYPERTENSION WITH GOAL BLOOD PRESSURE LESS THAN 140/90: ICD-10-CM

## 2021-10-14 LAB
ALBUMIN SERPL-MCNC: 4.1 G/DL (ref 3.4–5)
ALBUMIN/GLOB SERPL: 1.2 {RATIO} (ref 0.8–1.7)
ALP SERPL-CCNC: 95 U/L (ref 45–117)
ALT SERPL-CCNC: 46 U/L (ref 13–56)
ANION GAP SERPL CALC-SCNC: 6 MMOL/L (ref 3–18)
AST SERPL-CCNC: 23 U/L (ref 10–38)
BASOPHILS # BLD: 0.1 K/UL (ref 0–0.1)
BASOPHILS NFR BLD: 1 % (ref 0–2)
BILIRUB SERPL-MCNC: 1 MG/DL (ref 0.2–1)
BUN SERPL-MCNC: 16 MG/DL (ref 7–18)
BUN/CREAT SERPL: 20 (ref 12–20)
CALCIUM SERPL-MCNC: 9.4 MG/DL (ref 8.5–10.1)
CHLORIDE SERPL-SCNC: 105 MMOL/L (ref 100–111)
CHOLEST SERPL-MCNC: 261 MG/DL
CO2 SERPL-SCNC: 30 MMOL/L (ref 21–32)
CREAT SERPL-MCNC: 0.82 MG/DL (ref 0.6–1.3)
DIFFERENTIAL METHOD BLD: ABNORMAL
EOSINOPHIL # BLD: 0.1 K/UL (ref 0–0.4)
EOSINOPHIL NFR BLD: 1 % (ref 0–5)
ERYTHROCYTE [DISTWIDTH] IN BLOOD BY AUTOMATED COUNT: 13.5 % (ref 11.6–14.5)
EST. AVERAGE GLUCOSE BLD GHB EST-MCNC: 117 MG/DL
GLOBULIN SER CALC-MCNC: 3.4 G/DL (ref 2–4)
GLUCOSE SERPL-MCNC: 99 MG/DL (ref 74–99)
HBA1C MFR BLD: 5.7 % (ref 4.2–5.6)
HCT VFR BLD AUTO: 37.2 % (ref 35–45)
HDLC SERPL-MCNC: 81 MG/DL (ref 40–60)
HDLC SERPL: 3.2 {RATIO} (ref 0–5)
HGB BLD-MCNC: 12.3 G/DL (ref 12–16)
LDLC SERPL CALC-MCNC: 167 MG/DL (ref 0–100)
LIPID PROFILE,FLP: ABNORMAL
LYMPHOCYTES # BLD: 1.8 K/UL (ref 0.9–3.6)
LYMPHOCYTES NFR BLD: 33 % (ref 21–52)
MCH RBC QN AUTO: 30.5 PG (ref 24–34)
MCHC RBC AUTO-ENTMCNC: 33.1 G/DL (ref 31–37)
MCV RBC AUTO: 92.3 FL (ref 78–100)
MONOCYTES # BLD: 0.3 K/UL (ref 0.05–1.2)
MONOCYTES NFR BLD: 5 % (ref 3–10)
NEUTS SEG # BLD: 3.3 K/UL (ref 1.8–8)
NEUTS SEG NFR BLD: 59 % (ref 40–73)
PLATELET # BLD AUTO: 176 K/UL (ref 135–420)
PMV BLD AUTO: 11.8 FL (ref 9.2–11.8)
POTASSIUM SERPL-SCNC: 3.9 MMOL/L (ref 3.5–5.5)
PROT SERPL-MCNC: 7.5 G/DL (ref 6.4–8.2)
RBC # BLD AUTO: 4.03 M/UL (ref 4.2–5.3)
SODIUM SERPL-SCNC: 141 MMOL/L (ref 136–145)
TRIGL SERPL-MCNC: 65 MG/DL (ref ?–150)
VLDLC SERPL CALC-MCNC: 13 MG/DL
WBC # BLD AUTO: 5.5 K/UL (ref 4.6–13.2)

## 2021-10-14 PROCEDURE — 83036 HEMOGLOBIN GLYCOSYLATED A1C: CPT

## 2021-10-14 PROCEDURE — 80053 COMPREHEN METABOLIC PANEL: CPT

## 2021-10-14 PROCEDURE — 36415 COLL VENOUS BLD VENIPUNCTURE: CPT

## 2021-10-14 PROCEDURE — 85025 COMPLETE CBC W/AUTO DIFF WBC: CPT

## 2021-10-14 PROCEDURE — 80061 LIPID PANEL: CPT

## 2021-10-18 NOTE — PROGRESS NOTES
1. \"Have you been to the ER, urgent care clinic since your last visit? Hospitalized since your last visit? \" No    2. \"Have you seen or consulted any other health care providers outside of the [address] [place] [address] since your last visit? \" No     3. For patients aged 39-70: Has the patient had a colonoscopy? No-FOBT due    If the patient is female:    4. For patients aged 41-77: Has the patient had a mammogram within the past 2 years? Yes,  satisfied with blue hyperlink    5. For patients aged 21-65: Has the patient had a pap smear? No    Physician order obtained. Patient completed adult immunization consent form. Allergies, contraindications and recommendations reviewed with patient. High dose seasonal influenza vaccine administered IM right deltoid and Pneumovax 23 administered IM left deltoid . Patient tolerated well. Patient remained in office for 15 minutes after injection and no adverse reactions were noted.

## 2021-10-19 ENCOUNTER — OFFICE VISIT (OUTPATIENT)
Dept: FAMILY MEDICINE CLINIC | Age: 71
End: 2021-10-19
Payer: MEDICARE

## 2021-10-19 ENCOUNTER — OFFICE VISIT (OUTPATIENT)
Dept: FAMILY MEDICINE CLINIC | Age: 71
End: 2021-10-19

## 2021-10-19 VITALS
WEIGHT: 144 LBS | RESPIRATION RATE: 14 BRPM | HEIGHT: 68 IN | RESPIRATION RATE: 14 BRPM | HEART RATE: 80 BPM | OXYGEN SATURATION: 99 % | DIASTOLIC BLOOD PRESSURE: 84 MMHG | TEMPERATURE: 98.5 F | DIASTOLIC BLOOD PRESSURE: 84 MMHG | OXYGEN SATURATION: 99 % | HEART RATE: 80 BPM | WEIGHT: 144 LBS | SYSTOLIC BLOOD PRESSURE: 124 MMHG | SYSTOLIC BLOOD PRESSURE: 124 MMHG | TEMPERATURE: 98.5 F | HEIGHT: 68 IN | BODY MASS INDEX: 21.82 KG/M2 | BODY MASS INDEX: 21.82 KG/M2

## 2021-10-19 DIAGNOSIS — Z23 ENCOUNTER FOR IMMUNIZATION: ICD-10-CM

## 2021-10-19 DIAGNOSIS — E78.5 DYSLIPIDEMIA: ICD-10-CM

## 2021-10-19 DIAGNOSIS — M85.80 OSTEOPENIA, UNSPECIFIED LOCATION: ICD-10-CM

## 2021-10-19 DIAGNOSIS — Z78.0 POSTMENOPAUSAL STATE: ICD-10-CM

## 2021-10-19 DIAGNOSIS — R73.01 IFG (IMPAIRED FASTING GLUCOSE): ICD-10-CM

## 2021-10-19 DIAGNOSIS — I10 ESSENTIAL HYPERTENSION WITH GOAL BLOOD PRESSURE LESS THAN 140/90: Primary | ICD-10-CM

## 2021-10-19 DIAGNOSIS — G20 PARKINSON DISEASE (HCC): ICD-10-CM

## 2021-10-19 DIAGNOSIS — M48.061 SPINAL STENOSIS OF LUMBAR REGION, UNSPECIFIED WHETHER NEUROGENIC CLAUDICATION PRESENT: ICD-10-CM

## 2021-10-19 DIAGNOSIS — R73.03 PREDIABETES: ICD-10-CM

## 2021-10-19 DIAGNOSIS — Z12.11 COLON CANCER SCREENING: ICD-10-CM

## 2021-10-19 DIAGNOSIS — Z00.00 MEDICARE ANNUAL WELLNESS VISIT, SUBSEQUENT: Primary | ICD-10-CM

## 2021-10-19 PROCEDURE — G0439 PPPS, SUBSEQ VISIT: HCPCS | Performed by: FAMILY MEDICINE

## 2021-10-19 PROCEDURE — 1101F PT FALLS ASSESS-DOCD LE1/YR: CPT | Performed by: FAMILY MEDICINE

## 2021-10-19 PROCEDURE — G8536 NO DOC ELDER MAL SCRN: HCPCS | Performed by: FAMILY MEDICINE

## 2021-10-19 PROCEDURE — 1090F PRES/ABSN URINE INCON ASSESS: CPT | Performed by: FAMILY MEDICINE

## 2021-10-19 PROCEDURE — G8420 CALC BMI NORM PARAMETERS: HCPCS | Performed by: FAMILY MEDICINE

## 2021-10-19 PROCEDURE — G8510 SCR DEP NEG, NO PLAN REQD: HCPCS | Performed by: FAMILY MEDICINE

## 2021-10-19 PROCEDURE — 3017F COLORECTAL CA SCREEN DOC REV: CPT | Performed by: FAMILY MEDICINE

## 2021-10-19 PROCEDURE — 99214 OFFICE O/P EST MOD 30 MIN: CPT | Performed by: FAMILY MEDICINE

## 2021-10-19 PROCEDURE — G8427 DOCREV CUR MEDS BY ELIG CLIN: HCPCS | Performed by: FAMILY MEDICINE

## 2021-10-19 PROCEDURE — G0009 ADMIN PNEUMOCOCCAL VACCINE: HCPCS | Performed by: FAMILY MEDICINE

## 2021-10-19 PROCEDURE — G9899 SCRN MAM PERF RSLTS DOC: HCPCS | Performed by: FAMILY MEDICINE

## 2021-10-19 PROCEDURE — 90732 PPSV23 VACC 2 YRS+ SUBQ/IM: CPT | Performed by: FAMILY MEDICINE

## 2021-10-19 PROCEDURE — G8399 PT W/DXA RESULTS DOCUMENT: HCPCS | Performed by: FAMILY MEDICINE

## 2021-10-19 PROCEDURE — G8752 SYS BP LESS 140: HCPCS | Performed by: FAMILY MEDICINE

## 2021-10-19 PROCEDURE — G8754 DIAS BP LESS 90: HCPCS | Performed by: FAMILY MEDICINE

## 2021-10-19 PROCEDURE — 90694 VACC AIIV4 NO PRSRV 0.5ML IM: CPT | Performed by: FAMILY MEDICINE

## 2021-10-19 PROCEDURE — G0008 ADMIN INFLUENZA VIRUS VAC: HCPCS | Performed by: FAMILY MEDICINE

## 2021-10-19 NOTE — PROGRESS NOTES
Chief Complaint   Patient presents with   Nabil Myles Annual Wellness Visit     3 most recent PHQ Screens 10/19/2021   Little interest or pleasure in doing things Not at all   Feeling down, depressed, irritable, or hopeless Not at all   Total Score PHQ 2 0     Abuse Screening Questionnaire 10/19/2021   Do you ever feel afraid of your partner? N   Are you in a relationship with someone who physically or mentally threatens you? N   Is it safe for you to go home? Y     Fall Risk Assessment, last 12 mths 10/19/2021   Able to walk? Yes   Fall in past 12 months? 0   Do you feel unsteady? 1   Are you worried about falling 1   Is TUG test greater than 12 seconds? 1   Is the gait abnormal? 0   Number of falls in past 12 months 0       1. \"Have you been to the ER, urgent care clinic since your last visit? Hospitalized since your last visit? \" No     2. \"Have you seen or consulted any other health care providers outside of the [address] [place] [address] since your last visit? \" No      3. For patients aged 39-70: Has the patient had a colonoscopy? No-FOBT due     If the patient is female:     4. For patients aged 41-77: Has the patient had a mammogram within the past 2 years? Yes,  satisfied with blue hyperlink     5. For patients aged 21-65: Has the patient had a pap smear?  No

## 2021-10-19 NOTE — PROGRESS NOTES
SUBJECTIVE  Chief Complaint   Patient presents with    Hypertension    Tremors    Other     IFG/prediabetes     Patient presents for follow-up. Hypertension -   BPs at home normotensive however her BP initially here today was elevated. Says that she missed her pills due to having an OV. Has seen cardiology in the past and had a 2D ECHO. Compliant with meds otherwise.     Pre-DM -   Latest A1c with minimal elevation. Patient continues to work on maintaining a healthy weight through diet and regular exercise      Hyperlipidemia -  In the past patient reported poor compliance with Crestor 20 mg qhs. She says she takes it nightly now but her cholesterol continues to be very high. Her father had a stroke in his late 62s. Her brother had an MI in his 45s. She denies any cardiac or neuro symptoms to suggest either.     Parkinson Disease -   Followed by neurology, doing well on Sinemet but does have persistence of tremor and balance difficulties.       Spinal Stenosis -   This has resolved. She sees [facility] Fatuma Crawley at [facility]    Osteopenia - had bone density in 2018. It is all better now.      REVIEW OF SYSTEMS:  Respiratory: Negative for cough, shortness of breath and wheezing. Cardiovascular: Negative for chest pain, palpitations and leg swelling. OBJECTIVE  Blood pressure 124/84, pulse 80, temperature 98.5 °F (36.9 °C), temperature source Temporal, resp. rate 14, height 5' 8\" (1.727 m), weight 144 lb (65.3 kg), SpO2 99 %. General:  Alert, cooperative, well appearing, in no apparent distress. Chest:  Clear, no w/r/r. Heart:  Normal S1S2, RRR, no murmurs. Ext: no swelling  Psych: normal affect. Mood good. Oriented x 3. Judgement and insight intact.      Results for orders placed or performed during the hospital encounter of 10/14/21   CBC WITH AUTOMATED DIFF   Result Value Ref Range    WBC 5.5 4.6 - 13.2 K/uL    RBC 4.03 (L) 4.20 - 5.30 M/uL    HGB 12.3 12.0 - 16.0 g/dL    HCT 37.2 35.0 - 45.0 % MCV 92.3 78.0 - 100.0 FL    MCH 30.5 24.0 - 34.0 PG    MCHC 33.1 31.0 - 37.0 g/dL    RDW 13.5 11.6 - 14.5 %    PLATELET 643 317 - 019 K/uL    MPV 11.8 9.2 - 11.8 FL    NEUTROPHILS 59 40 - 73 %    LYMPHOCYTES 33 21 - 52 %    MONOCYTES 5 3 - 10 %    EOSINOPHILS 1 0 - 5 %    BASOPHILS 1 0 - 2 %    ABS. NEUTROPHILS 3.3 1.8 - 8.0 K/UL    ABS. LYMPHOCYTES 1.8 0.9 - 3.6 K/UL    ABS. MONOCYTES 0.3 0.05 - 1.2 K/UL    ABS. EOSINOPHILS 0.1 0.0 - 0.4 K/UL    ABS. BASOPHILS 0.1 0.0 - 0.1 K/UL    DF AUTOMATED     METABOLIC PANEL, COMPREHENSIVE   Result Value Ref Range    Sodium 141 136 - 145 mmol/L    Potassium 3.9 3.5 - 5.5 mmol/L    Chloride 105 100 - 111 mmol/L    CO2 30 21 - 32 mmol/L    Anion gap 6 3.0 - 18 mmol/L    Glucose 99 74 - 99 mg/dL    BUN 16 7.0 - 18 MG/DL    Creatinine 0.82 0.6 - 1.3 MG/DL    BUN/Creatinine ratio 20 12 - 20      GFR est AA >60 >60 ml/min/1.73m2    GFR est non-AA >60 >60 ml/min/1.73m2    Calcium 9.4 8.5 - 10.1 MG/DL    Bilirubin, total 1.0 0.2 - 1.0 MG/DL    ALT (SGPT) 46 13 - 56 U/L    AST (SGOT) 23 10 - 38 U/L    Alk. phosphatase 95 45 - 117 U/L    Protein, total 7.5 6.4 - 8.2 g/dL    Albumin 4.1 3.4 - 5.0 g/dL    Globulin 3.4 2.0 - 4.0 g/dL    A-G Ratio 1.2 0.8 - 1.7     LIPID PANEL   Result Value Ref Range    LIPID PROFILE          Cholesterol, total 261 (H) <200 MG/DL    Triglyceride 65 <150 MG/DL    HDL Cholesterol 81 (H) 40 - 60 MG/DL    LDL, calculated 167 (H) 0 - 100 MG/DL    VLDL, calculated 13 MG/DL    CHOL/HDL Ratio 3.2 0 - 5.0     HEMOGLOBIN A1C WITH EAG   Result Value Ref Range    Hemoglobin A1c 5.7 (H) 4.2 - 5.6 %    Est. average glucose 117 mg/dL     ASSESSMENT / PLAN    ICD-10-CM ICD-9-CM    1. Essential hypertension with goal blood pressure less than 140/90  I10 401.9    2. Dyslipidemia  E78.5 272.4    3. Prediabetes  R73.03 790.29    4. IFG (impaired fasting glucose)  R73.01 790.21    5.  Park[facility] disease ([facility]  G20 332.0    6. Spinal stenosis of lumbar region, unspecified whether neurogenic claudication present  M48.061 724.02    7. Encounter for immunization  Z23 V03.89 ADMIN INFLUENZA VIRUS VAC      FLU (FLUAD QUAD INFLUENZA VACCINE,QUAD,ADJUVANTED)     Reviewed latest labs. Essential hypertension  - elevated upon initial reading. Home readings confirmed. - Meds refilled as needed.  -cont current care      Dyslipidemia / family history of heart disease  - Both HDL and LDL have been elevated and are uncontrolled on recent labs  - Patient is at least on high intensity statin therapy. Unclear compliance pattern. Prediabetes / IFG  - Historically well-controlled  - Continue to work on diet & exercise  -check A1c in 6 months.     Parkinson disease ([facility]  - Stable on current dose of Sinemet   - Neurology notes reviewed    Spinal stenosis of lumbar region, unspecified whether neurogenic claudication present  - resolved    Osteopenia   - repeat bone density and make recommendations. Flu vaccine administered. All chart history elements were reviewed by me at the time of the visit even though marked at time of note closure. Patient understands our medical plan. Patient has provided input and agrees with goals. Alternatives have been explained and offered. All questions answered. The patient is to call if condition worsens or fails to improve. Follow-up and Dispositions    · Return in about 6 months (around 4/19/2022) for routine care. A1c to be done in office.

## 2021-10-19 NOTE — PROGRESS NOTES
Chief Complaint   Patient presents with    Annual Wellness Visit     This is the Subsequent Medicare Annual Wellness Exam, performed 12 months or more after the Initial AWV or the last Subsequent AWV    I have reviewed the patient's medical history in detail and updated the computerized patient record. History     Patient Active Problem List   Diagnosis Code    Essential hypertension with goal blood pressure less than 140/90 I10    Osteopenia M85.80    Dyslipidemia E78.5    Prediabetes R73.03    Frequent urination at night R35.1    Leg heaviness R29.898    Parkinson disease ([facility] Christopher Machuca     Past Medical History:   Diagnosis Date    Bladder perforation, intraoperative     during hysterectomy    Hyperlipidemia     Hypertension     Osteopenia     Parkinson disease ([facility] [address]     Prediabetes       Past Surgical History:   Procedure Laterality Date    HX  SECTION      HX JEROD AND BSO      LA EXTRAC ERUPTED TOOTH/EXPOSED ROOT       Current Outpatient Medications   Medication Sig Dispense Refill    valsartan-hydroCHLOROthiazide (DIOVAN-HCT) 160-25 mg per tablet TAKE 1 TABLET BY MOUTH DAILY 90 Tablet 0    amLODIPine (NORVASC) 10 mg tablet TAKE 1 TABLET BY MOUTH DAILY 90 Tablet 0    rosuvastatin (CRESTOR) 20 mg tablet TAKE 1 TABLET BY MOUTH EVERY NIGHT 90 Tablet 0    carbidopa-levodopa (SINEMET)  mg per tablet TAKE 1.5 TABLETS at 8 am, 12 noon, and 4 pm 405 Tablet 1    calcium carbonate/vitamin D3 (CALCIUM + D PO) Take  by mouth.  cholecalciferol, VITAMIN D3, (VITAMIN D3) 5,000 unit tab tablet Take  by mouth daily.  cyanocobalamin, vitamin B-12, (VITAMIN B-12 PO) Take  by mouth.        No Known Allergies    Family History   Problem Relation Age of Onset    Hypertension Mother     Hypertension Father     Hypertension Brother     Hypertension Brother     Kidney Disease Brother      Social History     Tobacco Use    Smoking status: Never Smoker    Smokeless tobacco: Never Used   Substance Use Topics    Alcohol use: No       Depression Risk Factor Screening:     3 most recent PHQ Screens 10/19/2021   Little interest or pleasure in doing things Not at all   Feeling down, depressed, irritable, or hopeless Not at all   Total Score PHQ 2 0       Alcohol Risk Screen   Do you average more than 1 drink per night or more than 7 drinks a week:  No    On any one occasion in the past three months have you have had more than 3 drinks containing alcohol:  No      Functional Ability and Level of Safety:   Hearing: Hearing is good. Activities of Daily Living: The home contains: no safety equipment. Patient does total self care     Ambulation: with some balance difficulties due to Parkinsonism. She is meaning to get a cane to assist but keeps putting it off. Fall Risk:  Fall Risk Assessment, last 12 mths 10/19/2021   Able to walk? Yes   Fall in past 12 months? 0   Do you feel unsteady? 1   Are you worried about falling 1   Is TUG test greater than 12 seconds? 1   Is the gait abnormal? 0   Number of falls in past 12 months 0     Abuse Screen:  Patient is not abused       Cognitive Screening   Has your family/caregiver stated any concerns about your memory: no     Patient Care Team   Patient Care Team:  Tasia Linda MD as PCP - General (Family Medicine)  Tasia Linda MD as PCP - [facility] Empaneled Provider  Herlinda Gastelum MD (Neurology)    Assessment/Plan   Education and counseling provided:  Are appropriate based on today's review and evaluation     ICD-10-CM ICD-9-CM    1. Medicare annual wellness visit, subsequent  Z00.00 V70.0    2. Colon cancer screening  Z12.11 V76.51 OCCULT BLOOD IMMUNOASSAY,DIAGNOSTIC   3. Postmenopausal state  Z78.0 V49.81 DEXA BONE DENSITY STUDY AXIAL     Age and sex specific counseling. Screening for depression and alcoholism. Advanced directives / end of life planning discussion- advised to bring a copy of ACP/living will. Care team updated. Shingles vaccine at local pharmacy advised. Medicare recommended screening and prevention test table is filled out, reviewed, and provided to patient. Scanned to chart as well. Ordered FIT test for CRCS since she declines colonoscopy. Patient understands our medical plan. Patient has provided input and agrees with goals. All questions answered.

## 2021-10-19 NOTE — PATIENT INSTRUCTIONS

## 2021-10-19 NOTE — PATIENT INSTRUCTIONS
Influenza (Flu) Vaccine (Inactivated or Recombinant): What You Need to Know  Why get vaccinated? Influenza vaccine can prevent influenza (flu). Flu is a contagious disease that spreads around the [place] every year, usually between October and May. Anyone can get the flu, but it is more dangerous for some people. Infants and young children, people 72years of age and older, pregnant women, and people with certain health conditions or a weakened immune system are at greatest risk of flu complications. Pneumonia, bronchitis, sinus infections and ear infections are examples of flu-related complications. If you have a medical condition, such as heart disease, cancer or diabetes, flu can make it worse. Flu can cause fever and chills, sore throat, muscle aches, fatigue, cough, headache, and runny or stuffy nose. Some people may have vomiting and diarrhea, though this is more common in children than adults. Each year, thousands of people in the [facility] die from flu, and many more are hospitalized. Flu vaccine prevents millions of illnesses and flu-related visits to the doctor each year. Influenza vaccine  CDC recommends everyone 10months of age and older get vaccinated every flu season. Children 6 months through 6years of age may need 2 doses during a single flu season. Everyone else needs only 1 dose each flu season. It takes about 2 weeks for protection to develop after vaccination. There are many flu viruses, and they are always changing. Each year a new flu vaccine is made to protect against three or four viruses that are likely to cause disease in the upcoming flu season. Even when the vaccine doesn't exactly match these viruses, it may still provide some protection. Influenza vaccine does not cause flu. Influenza vaccine may be given at the same time as other vaccines.   Talk with your health care provider  Tell your vaccine provider if the person getting the vaccine:  · Has had an allergic reaction after a previous dose of influenza vaccine, or has any severe, life-threatening allergies. · Has ever had Guillain-Barré Syndrome (also called GBS). In some cases, your health care provider may decide to postpone influenza vaccination to a future visit. People with minor illnesses, such as a cold, may be vaccinated. People who are moderately or severely ill should usually wait until they recover before getting influenza vaccine. Your health care provider can give you more information. Risks of a vaccine reaction  · Soreness, redness, and swelling where shot is given, fever, muscle aches, and headache can happen after influenza vaccine. · There may be a very small increased risk of Guillain-Barré Syndrome (GBS) after inactivated influenza vaccine (the flu shot). Mariana Duet children who get the flu shot along with pneumococcal vaccine (PCV13), and/or DTaP vaccine at the same time might be slightly more likely to have a seizure caused by fever. Tell your health care provider if a child who is getting flu vaccine has ever had a seizure. People sometimes faint after medical procedures, including vaccination. Tell your provider if you feel dizzy or have vision changes or ringing in the ears. As with any medicine, there is a very remote chance of a vaccine causing a severe allergic reaction, other serious injury, or death. What if there is a serious problem? An allergic reaction could occur after the vaccinated person leaves the clinic. If you see signs of a severe allergic reaction (hives, swelling of the face and throat, difficulty breathing, a fast heartbeat, dizziness, or weakness), call 9-1-1 and get the person to the nearest hospital.  For other signs that concern you, call your health care provider. Adverse reactions should be reported to the Vaccine Adverse Event Reporting System (VAERS). Your health care provider will usually file this report, or you can do it yourself.  Visit the VAERS website at www.vaers. [facility].gov or call 7-879.581.1695. VAERS is only for reporting reactions, and VAERS staff do not give medical advice. The National Vaccine Injury Compensation Program  The National Vaccine Injury Compensation Program (VICP) is a federal program that was created to compensate people who may have been injured by certain vaccines. Visit the VICP website at www.[facility].gov/vaccinecompensation or call 1-997.752.3108 to learn about the program and about filing a claim. There is a time limit to file a claim for compensation. How can I learn more? · Ask your healthcare provider. · Call your local or state health department. · Contact the Centers for Disease Control and Prevention (CDC):  ? Call 8-626.239.8283 (1-800-CDC-INFO) or  ? Visit CDC's website at www.cdc.gov/flu  Vaccine Information Statement (Interim)  Inactivated Influenza Vaccine  8/15/2019  42 MARGO Diaz [id number]  Department of Health and Human Services  Centers for Disease Control and Prevention  Many Vaccine Information Statements are available in Armenian and other languages. See www.immunize.org/vis. Muchas hojas de información sobre vacunas están disponibles en español y en otros idiomas. Visite www.immunize.org/vis. Care instructions adapted under license by [employer] (which disclaims liability or warranty for this information). If you have questions about a medical condition or this instruction, always ask your healthcare professional. [place] [address] any warranty or liability for your use of this information.

## 2021-11-17 ENCOUNTER — HOSPITAL ENCOUNTER (OUTPATIENT)
Dept: BONE DENSITY | Age: 71
Discharge: HOME | End: 2021-11-17
Attending: FAMILY MEDICINE
Payer: MEDICARE

## 2021-11-17 DIAGNOSIS — Z78.0 POSTMENOPAUSAL STATE: ICD-10-CM

## 2021-11-17 PROCEDURE — 77080 DXA BONE DENSITY AXIAL: CPT

## 2021-11-18 ENCOUNTER — TELEPHONE (OUTPATIENT)
Dept: FAMILY MEDICINE CLINIC | Age: 71
End: 2021-11-18

## 2021-11-18 NOTE — TELEPHONE ENCOUNTER
Nurse to advise that the testing shows a decline in bone density and is termed osteopenia. She does not have osteoporosis. The treatment at this time is to focus on taking calcium and vitamin D. I can review that at her April appointment. She should do between 1200-1500mg of calcium daily. If she is not on vit D weekly, she can do vit D 2,000 international units daily.

## 2021-11-18 NOTE — TELEPHONE ENCOUNTER
Pt is requesting the results of the Dexa scan done on 11/17/2021. Radiology told her the results would be in done last night. Please advise. When available.  Thank you

## 2021-11-18 NOTE — TELEPHONE ENCOUNTER
Patient identifiers verified. Patient advised that the DEXA scan shows a decline in bone density and is termed osteopenia. She does not have osteoporosis. The treatment at this time is to focus on taking calcium and vitamin D. Dr. Suresh Ramírez can review that at her April appointment. She should do between 1200-1500mg of calcium daily. If she is not on vit D weekly, she can do vit D 2,000 international units daily. The patient voices understanding.

## 2021-11-29 ENCOUNTER — HOSPITAL ENCOUNTER (OUTPATIENT)
Dept: LAB | Age: 71
Discharge: HOME | End: 2021-11-29
Payer: MEDICARE

## 2021-11-29 DIAGNOSIS — Z12.11 COLON CANCER SCREENING: ICD-10-CM

## 2021-11-29 PROCEDURE — 36415 COLL VENOUS BLD VENIPUNCTURE: CPT

## 2021-11-29 PROCEDURE — 82274 ASSAY TEST FOR BLOOD FECAL: CPT

## 2021-11-30 LAB — HEMOCCULT STL QL IA: NEGATIVE

## 2021-12-01 NOTE — PROGRESS NOTES
Patient identifiers verified. Patient advised of negative stool test. Repeat yearly. She voices understanding.

## 2021-12-06 DIAGNOSIS — E78.5 DYSLIPIDEMIA: ICD-10-CM

## 2021-12-08 RX ORDER — ROSUVASTATIN CALCIUM 20 MG/1
TABLET, COATED ORAL
Qty: 90 TABLET | Refills: 1 | Status: SHIPPED | OUTPATIENT
Start: 2021-12-08 | End: 2022-06-06

## 2022-01-10 DIAGNOSIS — I10 ESSENTIAL HYPERTENSION: ICD-10-CM

## 2022-01-10 DIAGNOSIS — I10 ESSENTIAL HYPERTENSION WITH GOAL BLOOD PRESSURE LESS THAN 140/90: Chronic | ICD-10-CM

## 2022-01-10 RX ORDER — AMLODIPINE BESYLATE 10 MG/1
10 TABLET ORAL DAILY
Qty: 90 TABLET | Refills: 1 | Status: SHIPPED | OUTPATIENT
Start: 2022-01-10

## 2022-01-10 RX ORDER — VALSARTAN AND HYDROCHLOROTHIAZIDE 160; 25 MG/1; MG/1
1 TABLET ORAL DAILY
Qty: 90 TABLET | Refills: 1 | Status: SHIPPED | OUTPATIENT
Start: 2022-01-10 | End: 2022-04-26

## 2022-03-18 PROBLEM — M85.80 OSTEOPENIA: Status: ACTIVE | Noted: 2018-06-12

## 2022-03-19 PROBLEM — G20.A1 PARKINSON DISEASE: Status: ACTIVE | Noted: 2018-06-19

## 2022-03-19 PROBLEM — G20 PARKINSON DISEASE (HCC): Status: ACTIVE | Noted: 2018-06-19

## 2022-03-19 PROBLEM — R73.03 PREDIABETES: Status: ACTIVE | Noted: 2018-06-12

## 2022-03-19 PROBLEM — R35.1 FREQUENT URINATION AT NIGHT: Status: ACTIVE | Noted: 2018-06-12

## 2022-03-19 PROBLEM — E78.5 DYSLIPIDEMIA: Status: ACTIVE | Noted: 2018-06-12

## 2022-03-20 PROBLEM — R29.898 LEG HEAVINESS: Status: ACTIVE | Noted: 2018-06-12

## 2022-04-26 DIAGNOSIS — I10 ESSENTIAL HYPERTENSION WITH GOAL BLOOD PRESSURE LESS THAN 140/90: Chronic | ICD-10-CM

## 2022-04-26 RX ORDER — VALSARTAN AND HYDROCHLOROTHIAZIDE 160; 25 MG/1; MG/1
1 TABLET ORAL DAILY
Qty: 30 TABLET | Refills: 0 | Status: SHIPPED | OUTPATIENT
Start: 2022-04-26 | End: 2022-04-28

## 2022-04-28 DIAGNOSIS — I10 ESSENTIAL HYPERTENSION WITH GOAL BLOOD PRESSURE LESS THAN 140/90: Chronic | ICD-10-CM

## 2022-04-28 RX ORDER — VALSARTAN AND HYDROCHLOROTHIAZIDE 160; 25 MG/1; MG/1
1 TABLET ORAL DAILY
Qty: 30 TABLET | Refills: 0 | Status: SHIPPED | OUTPATIENT
Start: 2022-04-28 | End: 2022-05-02

## 2022-04-30 DIAGNOSIS — I10 ESSENTIAL HYPERTENSION WITH GOAL BLOOD PRESSURE LESS THAN 140/90: Chronic | ICD-10-CM

## 2022-05-02 RX ORDER — VALSARTAN AND HYDROCHLOROTHIAZIDE 160; 25 MG/1; MG/1
1 TABLET ORAL DAILY
Qty: 30 TABLET | Refills: 0 | Status: SHIPPED | OUTPATIENT
Start: 2022-05-02 | End: 2022-05-04

## 2022-05-04 DIAGNOSIS — I10 ESSENTIAL HYPERTENSION WITH GOAL BLOOD PRESSURE LESS THAN 140/90: Chronic | ICD-10-CM

## 2022-05-04 RX ORDER — VALSARTAN AND HYDROCHLOROTHIAZIDE 160; 25 MG/1; MG/1
1 TABLET ORAL DAILY
Qty: 30 TABLET | Refills: 0 | Status: SHIPPED | OUTPATIENT
Start: 2022-05-04 | End: 2022-05-06

## 2022-05-06 DIAGNOSIS — I10 ESSENTIAL HYPERTENSION WITH GOAL BLOOD PRESSURE LESS THAN 140/90: Chronic | ICD-10-CM

## 2022-05-06 RX ORDER — VALSARTAN AND HYDROCHLOROTHIAZIDE 160; 25 MG/1; MG/1
1 TABLET ORAL DAILY
Qty: 90 TABLET | Refills: 0 | Status: SHIPPED | OUTPATIENT
Start: 2022-05-06 | End: 2022-10-25 | Stop reason: SDUPTHER

## 2022-05-23 NOTE — PROGRESS NOTES
1. \"Have you been to the ER, urgent care clinic since your last visit? Hospitalized since your last visit? \" No    2. \"Have you seen or consulted any other health care providers outside of the [address] [place] [address] since your last visit? \" No     3. For patients aged 39-70: Has the patient had a colonoscopy / FIT/ Cologuard? Yes - no Care Gap present -due 11/2022    If the patient is female:    4. For patients aged 41-77: Has the patient had a mammogram within the past 2 years? Yes - no Care Gap present      5. For patients aged 21-65: Has the patient had a pap smear?  NA - based on age or sex

## 2022-05-24 ENCOUNTER — OFFICE VISIT (OUTPATIENT)
Dept: FAMILY MEDICINE CLINIC | Age: 72
End: 2022-05-24
Payer: MEDICARE

## 2022-05-24 VITALS
HEIGHT: 68 IN | WEIGHT: 150 LBS | DIASTOLIC BLOOD PRESSURE: 72 MMHG | OXYGEN SATURATION: 98 % | BODY MASS INDEX: 22.73 KG/M2 | SYSTOLIC BLOOD PRESSURE: 108 MMHG | RESPIRATION RATE: 14 BRPM | HEART RATE: 78 BPM | TEMPERATURE: 98.3 F

## 2022-05-24 DIAGNOSIS — R73.01 IFG (IMPAIRED FASTING GLUCOSE): ICD-10-CM

## 2022-05-24 DIAGNOSIS — G20 PARKINSON DISEASE (HCC): ICD-10-CM

## 2022-05-24 DIAGNOSIS — M48.061 SPINAL STENOSIS OF LUMBAR REGION, UNSPECIFIED WHETHER NEUROGENIC CLAUDICATION PRESENT: ICD-10-CM

## 2022-05-24 DIAGNOSIS — E78.5 DYSLIPIDEMIA: ICD-10-CM

## 2022-05-24 DIAGNOSIS — I10 ESSENTIAL HYPERTENSION: Primary | ICD-10-CM

## 2022-05-24 DIAGNOSIS — R73.03 PREDIABETES: ICD-10-CM

## 2022-05-24 LAB — HBA1C MFR BLD HPLC: 5.7 %

## 2022-05-24 PROCEDURE — 1101F PT FALLS ASSESS-DOCD LE1/YR: CPT | Performed by: FAMILY MEDICINE

## 2022-05-24 PROCEDURE — G8399 PT W/DXA RESULTS DOCUMENT: HCPCS | Performed by: FAMILY MEDICINE

## 2022-05-24 PROCEDURE — 1090F PRES/ABSN URINE INCON ASSESS: CPT | Performed by: FAMILY MEDICINE

## 2022-05-24 PROCEDURE — G8752 SYS BP LESS 140: HCPCS | Performed by: FAMILY MEDICINE

## 2022-05-24 PROCEDURE — 83036 HEMOGLOBIN GLYCOSYLATED A1C: CPT | Performed by: FAMILY MEDICINE

## 2022-05-24 PROCEDURE — G8510 SCR DEP NEG, NO PLAN REQD: HCPCS | Performed by: FAMILY MEDICINE

## 2022-05-24 PROCEDURE — G9899 SCRN MAM PERF RSLTS DOC: HCPCS | Performed by: FAMILY MEDICINE

## 2022-05-24 PROCEDURE — 3017F COLORECTAL CA SCREEN DOC REV: CPT | Performed by: FAMILY MEDICINE

## 2022-05-24 PROCEDURE — G8536 NO DOC ELDER MAL SCRN: HCPCS | Performed by: FAMILY MEDICINE

## 2022-05-24 PROCEDURE — G8427 DOCREV CUR MEDS BY ELIG CLIN: HCPCS | Performed by: FAMILY MEDICINE

## 2022-05-24 PROCEDURE — 99214 OFFICE O/P EST MOD 30 MIN: CPT | Performed by: FAMILY MEDICINE

## 2022-05-24 PROCEDURE — G8754 DIAS BP LESS 90: HCPCS | Performed by: FAMILY MEDICINE

## 2022-05-24 PROCEDURE — G8420 CALC BMI NORM PARAMETERS: HCPCS | Performed by: FAMILY MEDICINE

## 2022-05-24 RX ORDER — GABAPENTIN 100 MG/1
100 CAPSULE ORAL
COMMUNITY
End: 2022-05-24

## 2022-05-24 RX ORDER — DICLOFENAC SODIUM 10 MG/G
GEL TOPICAL AS NEEDED
COMMUNITY

## 2022-05-24 NOTE — PATIENT INSTRUCTIONS
A Healthy Lifestyle: Care Instructions  Your Care Instructions     A healthy lifestyle can help you feel good, stay at a healthy weight, and have plenty of energy for both work and play. A healthy lifestyle is something you can share with your whole family. A healthy lifestyle also can lower your risk for serious health problems, such as high blood pressure, heart disease, and diabetes. You can follow a few steps listed below to improve your health and the health of your family. Follow-up care is a key part of your treatment and safety. Be sure to make and go to all appointments, and call your doctor if you are having problems. It's also a good idea to know your test results and keep a list of the medicines you take. How can you care for yourself at home? · Do not eat too much sugar, fat, or fast foods. You can still have dessert and treats now and then. The goal is moderation. · Start small to improve your eating habits. Pay attention to portion sizes, drink less juice and soda pop, and eat more fruits and vegetables. ? Eat a healthy amount of food. A 3-ounce serving of meat, for example, is about the size of a deck of cards. Fill the rest of your plate with vegetables and whole grains. ? Limit the amount of soda and sports drinks you have every day. Drink more water when you are thirsty. ? Eat plenty of fruits and vegetables every day. Have an apple or some carrot sticks as an afternoon snack instead of a candy bar. Try to have fruits and/or vegetables at every meal.  · Make exercise part of your daily routine. You may want to start with simple activities, such as walking, bicycling, or slow swimming. Try to be active 30 to 60 minutes every day. You do not need to do all 30 to 60 minutes all at once. For example, you can exercise 3 times a day for 10 or 20 minutes.  Moderate exercise is safe for most people, but it is always a good idea to talk to your doctor before starting an exercise program.  · Keep moving. Domenic Marcos the lawn, work in the garden, or [employer]. Take the stairs instead of the elevator at work. · If you smoke, quit. People who smoke have an increased risk for heart attack, stroke, cancer, and other lung illnesses. Quitting is hard, but there are ways to boost your chance of quitting tobacco for good. ? Use nicotine gum, patches, or lozenges. ? Ask your doctor about stop-smoking programs and medicines. ? Keep trying. In addition to reducing your risk of diseases in the future, you will notice some benefits soon after you stop using tobacco. If you have shortness of breath or asthma symptoms, they will likely get better within a few weeks after you quit. · Limit how much alcohol you drink. Moderate amounts of alcohol (up to 2 drinks a day for men, 1 drink a day for women) are okay. But drinking too much can lead to liver problems, high blood pressure, and other health problems. Family health  If you have a family, there are many things you can do together to improve your health. · Eat meals together as a family as often as possible. · Eat healthy foods. This includes fruits, vegetables, lean meats and dairy, and whole grains. · Include your family in your fitness plan. Most people think of activities such as jogging or tennis as the way to fitness, but there are many ways you and your family can be more active. Anything that makes you breathe hard and gets your heart pumping is exercise. Here are some tips:  ? Walk to do errands or to take your child to school or the bus.  ? Go for a family bike ride after dinner instead of watching TV. Where can you learn more? Go to http://www.gray.com/  Enter H209 in the search box to learn more about \"A Healthy Lifestyle: Care Instructions. \"  Current as of: June 16, 2021               Content Version: 13.2  © 0152-1556 Healthwise, Incorporated.    Care instructions adapted under license by [employer] (which disclaims liability or warranty for this information). If you have questions about a medical condition or this instruction, always ask your healthcare professional. [address] any warranty or liability for your use of this information.

## 2022-05-24 NOTE — PROGRESS NOTES
SUBJECTIVE  Chief Complaint   Patient presents with    Hypertension    Other     IFG     Patient presents for follow-up. Hypertension -   T\aking meds as prescribed. No side effects. Has seen cardiology in the past and had a 2D ECHO.     Pre-DM -   Latest A1c with minimal elevation. Due today. Patient continues to work on maintaining a healthy weight through diet and regular exercise      Hyperlipidemia -  In the past patient reported poor compliance with Crestor 20 mg qhs. She says she takes it nightly now. Her father had a stroke in his late 62s. Her brother had an MI in his 45s. She denies any cardiac or neuro symptoms to suggest either.     Parkinson Disease -   Followed by neurology, doing well on Sinemet but does have persistence of tremor and balance difficulties. Tremor at night makes sleep challenging.       Spinal Stenosis -   No complaints. Was seeing Dr. Ya at [address] [place] [address] [place]:  Respiratory: Negative for cough, shortness of breath and wheezing. Cardiovascular: Negative for chest pain, palpitations and leg swelling. OBJECTIVE  Blood pressure 108/72, pulse 78, temperature 98.3 °F (36.8 °C), temperature source Temporal, resp. rate 14, height 5' 8\" (1.727 m), weight 150 lb (68 kg), SpO2 98 %. General:  Alert, cooperative, well appearing, in no apparent distress. Chest:  Clear, no w/r/r. Heart:  Normal S1S2, RRR, no murmurs. Ext: no swelling  Psych: normal affect. Mood good. Oriented x 3. Judgement and insight intact. Results for orders placed or performed in visit on 05/24/22   AMB POC HEMOGLOBIN A1C   Result Value Ref Range    Hemoglobin A1c (POC) 5.7 %     ASSESSMENT / PLAN    ICD-10-CM ICD-9-CM    1. Essential hypertension  I10 401.9 CBC WITH AUTOMATED DIFF      METABOLIC PANEL, COMPREHENSIVE      LIPID PANEL   2. Dyslipidemia  E78.5 272.4 LIPID PANEL   3. Prediabetes  R73.03 790.29    4.  IFG (impaired fasting glucose)  R73.01 790.21 AMB POC HEMOGLOBIN A1C      HEMOGLOBIN A1C W/O EAG   5. Parkinson disease ([address]  G20 332.0    6. Spinal stenosis of lumbar region, unspecified whether neurogenic claudication present  M48.061 724.02      Reviewed latest labs. Essential hypertension  - controlled. - Meds refilled as needed.  -cont current care      Dyslipidemia / family history of heart disease  - Both HDL and LDL have been elevated and are uncontrolled on recent labs  - Patient is at least on high intensity statin therapy. Unclear compliance pattern. - check in 6 months. Prediabetes / IFG  - Historically well-controlled  - Continue to work on diet & exercise  -check A1c in 6 months.     Parkinson disease [facility])  - Neurology notes reviewed   - cont Sinemet per neuro  - advised to discuss night time tremor and sleep difficulties due to that with neuro. - 6 months overdue. - patient to call and schedule. Spinal stenosis of lumbar region, unspecified whether neurogenic claudication present  - inactive at this time    All chart history elements were reviewed by me at the time of the visit even though marked at time of note closure. Patient understands our medical plan. Patient has provided input and agrees with goals. Alternatives have been explained and offered. All questions answered. The patient is to call if condition worsens or fails to improve. Follow-up and Dispositions    · Return in about 6 months (around 11/24/2022) for routine care and Medicare Wellness exam.  Fasting labs due 3-7 days prior to appointment.

## 2022-06-04 DIAGNOSIS — E78.5 DYSLIPIDEMIA: ICD-10-CM

## 2022-06-06 RX ORDER — ROSUVASTATIN CALCIUM 20 MG/1
TABLET, COATED ORAL
Qty: 90 TABLET | Refills: 1 | Status: SHIPPED | OUTPATIENT
Start: 2022-06-06 | End: 2022-11-01 | Stop reason: SDUPTHER

## 2022-07-18 ENCOUNTER — TRANSCRIBE ORDER (OUTPATIENT)
Dept: SCHEDULING | Age: 72
End: 2022-07-18

## 2022-07-18 DIAGNOSIS — Z12.31 SCREENING MAMMOGRAM FOR HIGH-RISK PATIENT: Primary | ICD-10-CM

## 2022-08-02 ENCOUNTER — TELEPHONE (OUTPATIENT)
Dept: PHARMACY | Age: 72
End: 2022-08-02

## 2022-08-02 NOTE — LETTER
[address]  [address], [address]   [address] [address]           08/02/22     Dear Keily Woods,    We tried to reach you recently regarding your Rosuvastatin but were unable to reach you on the telephone. If you are no longer taking or taking differently, please call us at [phone] Option 2, that we can discuss this and update your medication profile. Rosuvastatin has been filled and is ready for you at:   [place] [address] [address] [facility]  [address]  [address] [address]  Phone: [phone] Fax: [phone]   . Please pick this medication up as soon as possible, if you have not already done so. It appears that this medication has not been filled at proper times. We are worried you might be missing doses or not taking it as directed. It is important that you take your medications regularly and try not to miss a single dose. Rosuvastatin not only helps to lower bad cholesterol, but has many other benefits. This is not a medication we \"feel\" working, so it can be difficult to remember to take. While you may not feel anything, remember to take this medication every day, so that you are getting the full benefit of this medication. It will help prevent heart attack, stroke, and other cardiovascular disease such as atherosclerosis- the hardening of your arteries.     Some ways to help you remember to take and refill your medications are to:  · Use a pill box, set an alarm, and/or keep your medication near something that you do every day  · Fill a 3-month supply of your prescription at a time to save you time and trips to the pharmacy - if you would like assistance in switching your prescriptions to a 3-month supply, please contact us [phone] Option 2  · Ask your pharmacy if they participate in [facility]", a program where you can  all of your medications on the same day  · Ask your pharmacy if you can be set up with automatic refill, where they will automatically refill your prescription when it is due and let you know it's ready to     Sincerely,   Renny Thomas PharmD, [facility]   Department, toll free: [phone] Option 2

## 2022-08-02 NOTE — TELEPHONE ENCOUNTER
[facility] CLINICAL PHARMACY: ADHERENCE REVIEW  Identified care gap per [facility]: fills at [facility]: ACE/ARB and Statin adherence    Last Visit: 05/24/2022    ASSESSMENT  ACE/ARB ADHERENCE    Insurance Records claims through 07/23/2022 (2021 [place] = n/a%; YTD [place] =  92%; Potential Fail Date: 12717/2022 ):   VALSART/HCTZ -25MG last filled on 07/22/2022 for 90 day supply. Next refill due: 10/20/2022    Per  [facility] Portal:   VALSART/HCTZ -25MG last filled on 07/22/2022 for 90 day supply. Per [facility]:   VALSART/HCTZ -25MG last picked up on 07/22/2022 for 90 day supply. 0 refills remaining. Billed through [facility]    BP Readings from Last 3 Encounters:   05/24/22 108/72   10/19/21 124/84   10/19/21 124/84     CrCl cannot be calculated (Patient's most recent lab result is older than the maximum 180 days allowed. ). [id number] Records claims through 07/23/202 (2021 [place] = n/a%; YTD [facility] [place] = Filled only once; Potential Fail Date: 08/04/2022):   Rosuvastatin 20mg last filled on 03/08/2022 for 90 day supply. Next refill due: 06/06/2022    Per [facility] Portal:   Rosuvastatin 20mg last filled on 03/08/2022 for 90 day supply. Per [facility]:   Rosuvastatin 20mg last picked up on 03/15/2022 for 90 day supply. 2 refills remaining.  Billed through [facility] Value Date/Time    Cholesterol, total 261 (H) 10/14/2021 07:58 AM    HDL Cholesterol 81 (H) 10/14/2021 07:58 AM    LDL, calculated 167 (H) 10/14/2021 07:58 AM    VLDL, calculated 13 10/14/2021 07:58 AM    Triglyceride 65 10/14/2021 07:58 AM    CHOL/HDL Ratio 3.2 10/14/2021 07:58 AM     ALT (SGPT)   Date Value Ref Range Status   10/14/2021 46 13 - 56 U/L Final     AST (SGOT)   Date Value Ref Range Status   10/14/2021 23 10 - 38 U/L Final     The 10-year ASCVD risk score (Dat Alvarez et al., 2013) is: 11.4%    Values used to calculate the score:      Age: 81 years      Sex: Female      Is Non- : Yes      Diabetic: No      Tobacco smoker: No      Systolic Blood Pressure: 140 mmHg      Is BP treated: Yes      HDL Cholesterol: 81 MG/DL      Total Cholesterol: 261 MG/DL     PLAN  The following are interventions that have been identified:  - Patient overdue refilling Rosuvastatin  and active on home medication list    Attempting to reach patient to review. Left message asking for return call. Called patient and left VM informingher that refill was processed and will be ready for  8/3/2022 @ 1100. Also sending letter for outreach.          Future Appointments   Date Time Provider Maritza Elam   2022  8:00 AM HBV CHELA RM 1 2D HBVRMAM HBV   2022  9:00 AM Benjamin Jones MD FP BS [address]  Direct: [phone]  Department, toll free:[phone], Option [address] in place: No  Recommendation Provided To: Patient/Caregiver: 1 via Telephone  Intervention Detail: Adherence Monitorin  Gap Closed?: Yes  Intervention Accepted By: Patient/Caregiver: 1  Time Spent (min): 30

## 2022-08-08 NOTE — TELEPHONE ENCOUNTER
Patient returned call. She denies missing doses and denies side effects. States she does take it at night but does not miss doses. She did pick it up last week.      Wing Alicia, PharmD, [address] Marissa Jiménez Pharmacist  Department: [phone]

## 2022-08-11 ENCOUNTER — HOSPITAL ENCOUNTER (OUTPATIENT)
Dept: MAMMOGRAPHY | Age: 72
Discharge: HOME | End: 2022-08-11
Attending: FAMILY MEDICINE
Payer: MEDICARE

## 2022-08-11 DIAGNOSIS — Z12.31 SCREENING MAMMOGRAM FOR HIGH-RISK PATIENT: ICD-10-CM

## 2022-08-11 PROCEDURE — 77063 BREAST TOMOSYNTHESIS BI: CPT

## 2022-08-12 ENCOUNTER — TELEPHONE (OUTPATIENT)
Dept: FAMILY MEDICINE CLINIC | Age: 72
End: 2022-08-12

## 2022-08-12 NOTE — TELEPHONE ENCOUNTER
Pt is requesting the results of her mammogram. She is aware that Dr Teddy Hernández is out of the office. Please advise.

## 2022-08-15 NOTE — TELEPHONE ENCOUNTER
Patient identifiers verified. Patient advised that there was no evidence for malignancy. Recommend routine annual follow-up. Patient voices understanding.

## 2022-10-05 ENCOUNTER — OFFICE VISIT (OUTPATIENT)
Dept: NEUROLOGY | Age: 72
End: 2022-10-05
Payer: MEDICARE

## 2022-10-05 ENCOUNTER — TELEPHONE (OUTPATIENT)
Dept: PHYSICAL THERAPY | Age: 72
End: 2022-10-05

## 2022-10-05 VITALS
WEIGHT: 146 LBS | OXYGEN SATURATION: 100 % | RESPIRATION RATE: 20 BRPM | HEIGHT: 68 IN | SYSTOLIC BLOOD PRESSURE: 160 MMHG | HEART RATE: 66 BPM | DIASTOLIC BLOOD PRESSURE: 80 MMHG | BODY MASS INDEX: 22.13 KG/M2

## 2022-10-05 DIAGNOSIS — G20 PARKINSON DISEASE (HCC): Primary | ICD-10-CM

## 2022-10-05 DIAGNOSIS — R25.1 TREMOR: ICD-10-CM

## 2022-10-05 DIAGNOSIS — R26.9 GAIT ABNORMALITY: ICD-10-CM

## 2022-10-05 DIAGNOSIS — I95.1 ORTHOSTATIC HYPOTENSION: ICD-10-CM

## 2022-10-05 PROCEDURE — G8399 PT W/DXA RESULTS DOCUMENT: HCPCS | Performed by: NURSE PRACTITIONER

## 2022-10-05 PROCEDURE — G8420 CALC BMI NORM PARAMETERS: HCPCS | Performed by: NURSE PRACTITIONER

## 2022-10-05 PROCEDURE — 1090F PRES/ABSN URINE INCON ASSESS: CPT | Performed by: NURSE PRACTITIONER

## 2022-10-05 PROCEDURE — G8536 NO DOC ELDER MAL SCRN: HCPCS | Performed by: NURSE PRACTITIONER

## 2022-10-05 PROCEDURE — G8432 DEP SCR NOT DOC, RNG: HCPCS | Performed by: NURSE PRACTITIONER

## 2022-10-05 PROCEDURE — 3017F COLORECTAL CA SCREEN DOC REV: CPT | Performed by: NURSE PRACTITIONER

## 2022-10-05 PROCEDURE — G8754 DIAS BP LESS 90: HCPCS | Performed by: NURSE PRACTITIONER

## 2022-10-05 PROCEDURE — 1123F ACP DISCUSS/DSCN MKR DOCD: CPT | Performed by: NURSE PRACTITIONER

## 2022-10-05 PROCEDURE — G8427 DOCREV CUR MEDS BY ELIG CLIN: HCPCS | Performed by: NURSE PRACTITIONER

## 2022-10-05 PROCEDURE — G8752 SYS BP LESS 140: HCPCS | Performed by: NURSE PRACTITIONER

## 2022-10-05 PROCEDURE — 99215 OFFICE O/P EST HI 40 MIN: CPT | Performed by: NURSE PRACTITIONER

## 2022-10-05 PROCEDURE — G9899 SCRN MAM PERF RSLTS DOC: HCPCS | Performed by: NURSE PRACTITIONER

## 2022-10-05 PROCEDURE — 1101F PT FALLS ASSESS-DOCD LE1/YR: CPT | Performed by: NURSE PRACTITIONER

## 2022-10-05 RX ORDER — CARBIDOPA AND LEVODOPA 25; 100 MG/1; MG/1
TABLET ORAL
Qty: 360 TABLET | Refills: 1 | Status: SHIPPED | OUTPATIENT
Start: 2022-10-05

## 2022-10-05 NOTE — PROGRESS NOTES
[address] [place]. Kishan Marcelo, [address]  Office:  [phone]  Fax: [phone]  Chief Complaint   Patient presents with    Follow-up       HPI: Héctor Robbins presents in follow-up for Parkinson's disease. She was seen here in initial evaluation in June 2018 by Dr. Vane Mccormick for complaint of right leg dragging. She has history of hypertension. It was noted that she noted heaviness of the right leg for the past month or more at that time. It may have been going on for a few months. It may have started after heavy workout with weights. She did not injure herself. No falls or tripping. Some intermittent tremor of the left hand. It was believed that this is early Parkinson's. She was placed on a low-dose Sinemet. She was continued on Requip for restlessness at night. She had an MRI of the brain in June 2018 which reported no acute intracranial process and reported mild burden of white matter lesions, nonspecific. Microvascular ischemic change would be most common but a demyelinating process was not completely excluded. No enhancement. At the next visit in September 2018 it was noted that she noticed a significant improvement in her standing posture and ambulation with Sinemet. She was getting PT for her knees, which also helped with balance. Some sleepiness with the Sinemet. At a later visit in May 2019 it was noted that she had mild right forearm spasticity from her Parkinson's. She was then followed here by LEOPOLDO Montano. It was noted that she sees Dr. Ya for leg pain. It was noted that she has a history of RLS and was taking Requip for this but did not have symptoms so this was titrated off. At a visit in January 2020 was noted that she was going to see Dr. Toshia Mcwilliams for right hand stiffness. She was last seen here on 6/14/2021 by LEOPOLDO Montano.   It was noted she was now on Sinemet 1 and half tabs 3 times daily, tolerating the medication, and tremors generally well controlled to the right hand except and stress related circumstances. It was noted that she had no overt hallucinations and denies trouble with her gait or falls. She was continued on the same regimen. She presents today in follow-up. She wonders about diagnosis of Parkinson's because friends in healthcare have thought she does not have Parkinson's. In terms of her walking, she reports it's a little off, has to be careful so she doesn't fall. Had PT for her legs and knees in the past.  Reports increased tremor. Reports being mostly affected on right hand, not left. Mostly at night time, takes her a while to go to sleep. Can feel the tremor in her chest at night. Just in right hand during the day. Her previous PCP gave her gabapentin for that, which worked. Only takes it if really aggravating. She is not sure if it was orthopedics who gave her the gabapentin for pain. She has a bottle of gabapentin 300 mg twice daily here but not taking it since she last saw her PCP. Tremor worse if she gets too cold or too hot with the weather. Nothing else seems to aggravate it. Has 1 cup of coffee in AM (5 AM) and then 1 cup at around 10/10:30, doesn't seem to make the tremor worse. Wine once in a while, maybe once every 2-3 months, does seem to diminish the tremor. Mom is 80, no tremor, in good health. Reports history of essential tremor in one of her 3 sisters. Takes Sinemet 1.5 tabs of the  mg tablet at 8, 12, and 4. Reports that it doesn't seem to help the tremor. She believes it doesn't seem to help with anything. In terms of it helping anything when initially started, she denies. Reports hand problem with right hand 3rd digit wouldn't go in flexion, much better with PT. Denies tremor of legs or voice or head. Slight head tremor noted on exam.  After she thought she did not take the Sinemet yet today but then on second thought she believes she did. She takes it with food. She is right-handed. Writing is a real difficulty, used to have good penmanship. No problem with eating, using utensils. No falls. Endorses she has had a couple hallucinations, reported here before, was seeing things at night time in the room- they were just white, like a person's figure, no face. Or seeing kids. Last time she's seen it was Saturday. The hallucinations are not disturbing or scare her. Denies recent illness or fever. Denies memory issues. Lives with . She does not report that he has concerns such as with behavior. Says he sometimes tells her she's not walking right or looks funny in the eyes. Sometimes she feels dizzy- thinks it's after taking the Sinemet. The last change in Sinemet was to 1.5 tabs in 2021. Denies missing doses. Sometimes the medication makes her dizzy. Has to sit down. Problems getting up from chair, has to hold something to push up. Denies morning akinesia. Gets up at 5 AM then eats at around 9. Gets up in the night 3 times to use the bathroom, had bladder surgery several years ago. She stopped driving due to the med, doesn't know when the dizziness will hit her, her  drives. She walks 1-2 times a week- a mile and a half. She is retired, was in [place] [employer],  of [employer], retired 6 years ago. Past Medical History:   Diagnosis Date    Bladder perforation, intraoperative     during hysterectomy    Hyperlipidemia     Hypertension     Osteopenia     Parkinson disease ([facility] [address]     Prediabetes        Past Surgical History:   Procedure Laterality Date    HX  SECTION      HX JEROD AND BSO      AR EXTRAC ERUPTED TOOTH/EXPOSED ROOT         Current Outpatient Medications   Medication Sig Dispense Refill    carbidopa-levodopa (SINEMET)  mg per tablet Take 1 tab by mouth 4 times a day.  Take at 8AM, 12PM, 4PM, 8PM.  Indications: a type of movement disorder called parkinsonism 360 Tablet 1    rosuvastatin (CRESTOR) 20 mg tablet TAKE 1 TABLET BY MOUTH EVERY NIGHT 90 Tablet 1    diclofenac (VOLTAREN) 1 % gel Apply  to affected area as needed for Pain.      valsartan-hydroCHLOROthiazide (DIOVAN-HCT) 160-25 mg per tablet TAKE 1 TABLET BY MOUTH DAILY 90 Tablet 0    amLODIPine (NORVASC) 10 mg tablet TAKE 1 TABLET BY MOUTH DAILY 90 Tablet 1    calcium carbonate/vitamin D3 (CALCIUM + D PO) Take  by mouth. cholecalciferol, VITAMIN D3, (VITAMIN D3) 5,000 unit tab tablet Take  by mouth daily. cyanocobalamin, vitamin B-12, (VITAMIN B-12 PO) Take  by mouth. No Known Allergies    Social History     Tobacco Use    Smoking status: Never    Smokeless tobacco: Never   Substance Use Topics    Alcohol use: No    Drug use: No       Family History   Problem Relation Age of Onset    Hypertension Mother     Hypertension Father     Hypertension Brother     Hypertension Brother     Kidney Disease Brother        Review of Systems:  GENERAL: Denies fever or fatigue. +dizziness with position change. CARDIAC: No CP or SOB  PULMONARY: No cough or SOB  MUSCULOSKELETAL: No new joint pain  NEURO: SEE HPI    Physical Examination:  Visit Vitals  BP (!) 160/80 (BP Patient Position: Lying)   Pulse 66   Resp 20   Ht 5' 8\" (1.727 m)   Wt 66.2 kg (146 lb)   SpO2 100%   BMI 22.20 kg/m²     Initial blood pressure was 138/90. Orthostatics:  Lying 160/80 with pulse of 66  Sitting 130/80 with pulse of 74  Standing 90/60 with a pulse of 75. Alert, in NAD. Heart is regular. Oriented x3. Speech is fluent. Speech clear. No hypophonia. She has a decreased blink rate. EOMs are full, PERRL, VFFTC, no nystagmus. No facial asymmetry. Tongue is midline. When mask removed, trace lip tremor noted. Mild head tremor noted. Strength is normal.  Resting tremor of the right upper extremity noted. Right thumb tremor noted one focusing on lower extremities motor exam.  Left upper extremity more than right upper extremity tremor with outstretched arms.   Mild rigidity in upper extremities, right more than left, mild cogwheeling bilaterally. No drift of the bilateral upper extremities. Fine finger movements symmetrical but mild slowness. FNF intact bilaterally with bilateral upper extremities tremor. Romberg negative. DTRs +2.  Gait: mild shuffling, decreased arm swing bilaterally, mild tremor noted to the left upper extremity when ambulating, had mild difficulty getting up from chair. Handwriting sample and swirl pattern will be provided to be scanned in to the chart. No micrographia. Difficulty to compare physical exam to previous appointment which was greater than 1 year ago and was virtual.      Impression/Plan: This is a 61-year-old right-handed female who presents in follow-up for Parkinson's disease. This was diagnosed in June 2018 when she presented with complaint of right leg dragging. She was noted to have features of parkinsonism on exam.  She was started on Sinemet and it was noted at the next visit that she noticed a significant improvement in her standing posture and ambulation with the Sinemet. Since then the Sinemet has been increased over time. Previously she was on Requip for restless leg syndrome, now off the medication. Today she reports increase in tremor and is not clear on the diagnosis of Parkinson's disease. We discussed Parkinson's disease versus essential tremor. She has features of Parkinson's disease on exam.  She brings up concerns such today as difficulty getting up from a chair, writing, some imbalance, position related dizziness, and difficulty sleeping at night due to the tremor. On orthostatic vitals she had a decrease in her systolic blood pressure from 160 to 130 to 90 from lying to sitting to standing. Mild increase in pulse rate. She believes that she did take the Sinemet this morning. She is currently taking 1.5 tablets 3 times a day, at 8, 12, and 4.   We will try to optimize the current regimen of Sinemet to 1 tab 4 times daily spread out throughout the day to 8, 12, 4, and 8 due to her difficulty getting to sleep at night due to the tremor, and also some dizziness after the medication. We discussed orthostatic hypotension and this can be worsened from the medication. Advised on drinking plenty of fluids, to try wearing her compression stockings, and sitting on the edge of the bed first before getting up. She is on antihypertensive medications. Initial BP was elevated and her supine BP was elevated. She will be following up with her PCP soon within the next few weeks. Recommended physical therapy to also help for the dizziness; I recommended the [facility] BIG program for Parkinson's. She is interested in proceeding with this. Advised to let me know if the BIG program cannot be coordinated and we can place a new order for PT focused in Parkinson's for gait, balance, and dizziness. We will check thyroid function tests related to the tremor as well. She endorses hallucinations going on for a while but they do not disturb her. Initial MRI brain in June 2018 reported mild burden of white matter lesions, nonspecific, believed to likely be microvascular ischemic change but demyelinating process not completely excluded. We can consider repeating MRI. Advised on good management of vascular risk factors including following with primary care physician regarding blood pressure and hyperlipidemia. Endorses taking the rosuvastatin at night. Encouraged regular exercise. Follow up in 2 months. Diagnoses and all orders for this visit:    1. Parkinson disease ([address]  -     carbidopa-levodopa (SINEMET)  mg per tablet; Take 1 tab by mouth 4 times a day. Take at 8AM, 12PM, 4PM, 8PM.  Indications: a type of movement disorder called parkinsonism  -     REFERRAL TO PHYSICAL THERAPY    2. Tremor  -     TSH AND FREE T4; Future  -     T3 TOTAL; Future    3. Gait abnormality    4.  Orthostatic hypotension    Total time 50 minutes with 30 minutes spent in counseling. Signed By: Diana Cedeno NP        PLEASE NOTE:   Portions of this document may have been produced using voice recognition software. Unrecognized errors in transcription may be present.

## 2022-10-05 NOTE — Clinical Note
Good morning. Patient will be seeing you in follow-up soon. Orthostatic hypotension noted and nonpharmacologic measures discussed; advised to discuss with you at next appointment in a couple weeks. Thank you!   Joe Zarco

## 2022-10-05 NOTE — PATIENT INSTRUCTIONS
Patient instructions:  -Sinemet to 1 tab 4 times per day  -lab  -intake of adequate fluids, compression stockings, sitting before getting up  -PT for [facility] BIG program  -[address]  -follow up in 2 months

## 2022-10-17 ENCOUNTER — HOSPITAL ENCOUNTER (OUTPATIENT)
Dept: LAB | Age: 72
Discharge: HOME | End: 2022-10-17
Payer: MEDICARE

## 2022-10-17 DIAGNOSIS — R73.01 IFG (IMPAIRED FASTING GLUCOSE): ICD-10-CM

## 2022-10-17 DIAGNOSIS — I10 ESSENTIAL HYPERTENSION: ICD-10-CM

## 2022-10-17 DIAGNOSIS — R25.1 TREMOR: ICD-10-CM

## 2022-10-17 DIAGNOSIS — E78.5 DYSLIPIDEMIA: ICD-10-CM

## 2022-10-17 LAB
ALBUMIN SERPL-MCNC: 4.3 G/DL (ref 3.4–5)
ALBUMIN/GLOB SERPL: 1.2 {RATIO} (ref 0.8–1.7)
ALP SERPL-CCNC: 119 U/L (ref 45–117)
ALT SERPL-CCNC: 44 U/L (ref 13–56)
ANION GAP SERPL CALC-SCNC: 5 MMOL/L (ref 3–18)
AST SERPL-CCNC: 25 U/L (ref 10–38)
BASOPHILS # BLD: 0 K/UL (ref 0–0.1)
BASOPHILS NFR BLD: 1 % (ref 0–2)
BILIRUB SERPL-MCNC: 0.9 MG/DL (ref 0.2–1)
BUN SERPL-MCNC: 21 MG/DL (ref 7–18)
BUN/CREAT SERPL: 21 (ref 12–20)
CALCIUM SERPL-MCNC: 9.9 MG/DL (ref 8.5–10.1)
CHLORIDE SERPL-SCNC: 104 MMOL/L (ref 100–111)
CHOLEST SERPL-MCNC: 184 MG/DL
CO2 SERPL-SCNC: 30 MMOL/L (ref 21–32)
CREAT SERPL-MCNC: 1 MG/DL (ref 0.6–1.3)
DIFFERENTIAL METHOD BLD: ABNORMAL
EOSINOPHIL # BLD: 0.1 K/UL (ref 0–0.4)
EOSINOPHIL NFR BLD: 2 % (ref 0–5)
ERYTHROCYTE [DISTWIDTH] IN BLOOD BY AUTOMATED COUNT: 13.7 % (ref 11.6–14.5)
GLOBULIN SER CALC-MCNC: 3.7 G/DL (ref 2–4)
GLUCOSE SERPL-MCNC: 100 MG/DL (ref 74–99)
HBA1C MFR BLD: 5.9 % (ref 4.2–5.6)
HCT VFR BLD AUTO: 36.6 % (ref 35–45)
HDLC SERPL-MCNC: 84 MG/DL (ref 40–60)
HDLC SERPL: 2.2 {RATIO} (ref 0–5)
HGB BLD-MCNC: 11.9 G/DL (ref 12–16)
IMM GRANULOCYTES # BLD AUTO: 0 K/UL (ref 0–0.04)
IMM GRANULOCYTES NFR BLD AUTO: 0 % (ref 0–0.5)
LDLC SERPL CALC-MCNC: 88.4 MG/DL (ref 0–100)
LIPID PROFILE,FLP: ABNORMAL
LYMPHOCYTES # BLD: 2.1 K/UL (ref 0.9–3.6)
LYMPHOCYTES NFR BLD: 46 % (ref 21–52)
MCH RBC QN AUTO: 30.2 PG (ref 24–34)
MCHC RBC AUTO-ENTMCNC: 32.5 G/DL (ref 31–37)
MCV RBC AUTO: 92.9 FL (ref 78–100)
MONOCYTES # BLD: 0.3 K/UL (ref 0.05–1.2)
MONOCYTES NFR BLD: 7 % (ref 3–10)
NEUTS SEG # BLD: 2.1 K/UL (ref 1.8–8)
NEUTS SEG NFR BLD: 45 % (ref 40–73)
NRBC # BLD: 0 K/UL (ref 0–0.01)
NRBC BLD-RTO: 0 PER 100 WBC
PLATELET # BLD AUTO: 170 K/UL (ref 135–420)
PMV BLD AUTO: 12.4 FL (ref 9.2–11.8)
POTASSIUM SERPL-SCNC: 3.8 MMOL/L (ref 3.5–5.5)
PROT SERPL-MCNC: 8 G/DL (ref 6.4–8.2)
RBC # BLD AUTO: 3.94 M/UL (ref 4.2–5.3)
SODIUM SERPL-SCNC: 139 MMOL/L (ref 136–145)
T4 FREE SERPL-MCNC: 1.2 NG/DL (ref 0.7–1.5)
TRIGL SERPL-MCNC: 58 MG/DL (ref ?–150)
TSH SERPL DL<=0.05 MIU/L-ACNC: 0.91 UIU/ML (ref 0.36–3.74)
VLDLC SERPL CALC-MCNC: 11.6 MG/DL
WBC # BLD AUTO: 4.6 K/UL (ref 4.6–13.2)

## 2022-10-17 PROCEDURE — 80061 LIPID PANEL: CPT

## 2022-10-17 PROCEDURE — 84480 ASSAY TRIIODOTHYRONINE (T3): CPT

## 2022-10-17 PROCEDURE — 36415 COLL VENOUS BLD VENIPUNCTURE: CPT

## 2022-10-17 PROCEDURE — 83036 HEMOGLOBIN GLYCOSYLATED A1C: CPT

## 2022-10-17 PROCEDURE — 80053 COMPREHEN METABOLIC PANEL: CPT

## 2022-10-17 PROCEDURE — 85025 COMPLETE CBC W/AUTO DIFF WBC: CPT

## 2022-10-17 PROCEDURE — 84439 ASSAY OF FREE THYROXINE: CPT

## 2022-10-18 LAB — T3 SERPL-MCNC: 108 NG/DL (ref 71–180)

## 2022-10-19 ENCOUNTER — TELEPHONE (OUTPATIENT)
Dept: NEUROLOGY | Age: 72
End: 2022-10-19

## 2022-10-20 ENCOUNTER — TELEPHONE (OUTPATIENT)
Dept: NEUROLOGY | Age: 72
End: 2022-10-20

## 2022-10-20 ENCOUNTER — OFFICE VISIT (OUTPATIENT)
Dept: FAMILY MEDICINE CLINIC | Age: 72
End: 2022-10-20

## 2022-10-20 ENCOUNTER — OFFICE VISIT (OUTPATIENT)
Dept: FAMILY MEDICINE CLINIC | Age: 72
End: 2022-10-20
Payer: MEDICARE

## 2022-10-20 VITALS
WEIGHT: 148 LBS | HEART RATE: 69 BPM | TEMPERATURE: 98 F | DIASTOLIC BLOOD PRESSURE: 80 MMHG | SYSTOLIC BLOOD PRESSURE: 130 MMHG | BODY MASS INDEX: 22.43 KG/M2 | RESPIRATION RATE: 14 BRPM | OXYGEN SATURATION: 100 % | HEIGHT: 68 IN

## 2022-10-20 VITALS
HEIGHT: 68 IN | WEIGHT: 148 LBS | BODY MASS INDEX: 22.43 KG/M2 | TEMPERATURE: 98 F | RESPIRATION RATE: 14 BRPM | HEART RATE: 69 BPM | SYSTOLIC BLOOD PRESSURE: 130 MMHG | OXYGEN SATURATION: 100 % | DIASTOLIC BLOOD PRESSURE: 80 MMHG

## 2022-10-20 DIAGNOSIS — G20 PARKINSON DISEASE (HCC): ICD-10-CM

## 2022-10-20 DIAGNOSIS — R73.03 PREDIABETES: ICD-10-CM

## 2022-10-20 DIAGNOSIS — R35.1 NOCTURIA: ICD-10-CM

## 2022-10-20 DIAGNOSIS — I10 ESSENTIAL HYPERTENSION: Primary | ICD-10-CM

## 2022-10-20 DIAGNOSIS — Z23 ENCOUNTER FOR IMMUNIZATION: ICD-10-CM

## 2022-10-20 DIAGNOSIS — Z71.89 ADVANCED DIRECTIVES, COUNSELING/DISCUSSION: ICD-10-CM

## 2022-10-20 DIAGNOSIS — R73.01 IFG (IMPAIRED FASTING GLUCOSE): ICD-10-CM

## 2022-10-20 DIAGNOSIS — Z00.00 MEDICARE ANNUAL WELLNESS VISIT, SUBSEQUENT: Primary | ICD-10-CM

## 2022-10-20 DIAGNOSIS — Z12.11 COLON CANCER SCREENING: ICD-10-CM

## 2022-10-20 DIAGNOSIS — E78.5 DYSLIPIDEMIA: ICD-10-CM

## 2022-10-20 PROCEDURE — G9899 SCRN MAM PERF RSLTS DOC: HCPCS | Performed by: FAMILY MEDICINE

## 2022-10-20 PROCEDURE — G8399 PT W/DXA RESULTS DOCUMENT: HCPCS | Performed by: FAMILY MEDICINE

## 2022-10-20 PROCEDURE — G8510 SCR DEP NEG, NO PLAN REQD: HCPCS | Performed by: FAMILY MEDICINE

## 2022-10-20 PROCEDURE — 1123F ACP DISCUSS/DSCN MKR DOCD: CPT | Performed by: FAMILY MEDICINE

## 2022-10-20 PROCEDURE — G8536 NO DOC ELDER MAL SCRN: HCPCS | Performed by: FAMILY MEDICINE

## 2022-10-20 PROCEDURE — 3017F COLORECTAL CA SCREEN DOC REV: CPT | Performed by: FAMILY MEDICINE

## 2022-10-20 PROCEDURE — G8752 SYS BP LESS 140: HCPCS | Performed by: FAMILY MEDICINE

## 2022-10-20 PROCEDURE — 1101F PT FALLS ASSESS-DOCD LE1/YR: CPT | Performed by: FAMILY MEDICINE

## 2022-10-20 PROCEDURE — 99497 ADVNCD CARE PLAN 30 MIN: CPT | Performed by: FAMILY MEDICINE

## 2022-10-20 PROCEDURE — 1090F PRES/ABSN URINE INCON ASSESS: CPT | Performed by: FAMILY MEDICINE

## 2022-10-20 PROCEDURE — G8420 CALC BMI NORM PARAMETERS: HCPCS | Performed by: FAMILY MEDICINE

## 2022-10-20 PROCEDURE — G8754 DIAS BP LESS 90: HCPCS | Performed by: FAMILY MEDICINE

## 2022-10-20 PROCEDURE — 90694 VACC AIIV4 NO PRSRV 0.5ML IM: CPT | Performed by: FAMILY MEDICINE

## 2022-10-20 PROCEDURE — G0439 PPPS, SUBSEQ VISIT: HCPCS | Performed by: FAMILY MEDICINE

## 2022-10-20 PROCEDURE — G8427 DOCREV CUR MEDS BY ELIG CLIN: HCPCS | Performed by: FAMILY MEDICINE

## 2022-10-20 PROCEDURE — 99214 OFFICE O/P EST MOD 30 MIN: CPT | Performed by: FAMILY MEDICINE

## 2022-10-20 PROCEDURE — G0008 ADMIN INFLUENZA VIRUS VAC: HCPCS | Performed by: FAMILY MEDICINE

## 2022-10-20 NOTE — PROGRESS NOTES
Chief Complaint   Patient presents with    Annual Wellness Visit     3 most recent PHQ Screens 10/20/2022   Little interest or pleasure in doing things Not at all   Feeling down, depressed, irritable, or hopeless Not at all   Total Score PHQ 2 0     Abuse Screening Questionnaire 10/20/2022   Do you ever feel afraid of your partner? N   Are you in a relationship with someone who physically or mentally threatens you? N   Is it safe for you to go home? Y     Fall Risk Assessment, last 12 mths 10/20/2022   Able to walk? Yes   Fall in past 12 months? 0   Do you feel unsteady? 1   Are you worried about falling 0   Is TUG test greater than 12 seconds? 0   Is the gait abnormal? 0   Number of falls in past 12 months 0   Fall with injury? 0     1. \"Have you been to the ER, urgent care clinic since your last visit? Hospitalized since your last visit? \" No     2. \"Have you seen or consulted any other health care providers outside of the [address] [place] [address] since your last visit? \" No      3. For patients aged 39-70: Has the patient had a colonoscopy / FIT/ Cologuard? Yes - no Care Gap present-due 11/2022        If the patient is female:     4. For patients aged 41-77: Has the patient had a mammogram within the past 2 years? Yes - no Care Gap present        5. For patients aged 21-65: Has the patient had a pap smear?  NA - based on age or sex

## 2022-10-20 NOTE — PROGRESS NOTES
1. \"Have you been to the ER, urgent care clinic since your last visit? Hospitalized since your last visit? \" No    2. \"Have you seen or consulted any other health care providers outside of the [address] [place] [address] since your last visit? \" No     3. For patients aged 39-70: Has the patient had a colonoscopy / FIT/ Cologuard? Yes - no Care Gap present-due 11/2022      If the patient is female:    4. For patients aged 41-77: Has the patient had a mammogram within the past 2 years? Yes - no Care Gap present      5. For patients aged 21-65: Has the patient had a pap smear? NA - based on age or sex    Physician order obtained. Patient completed adult immunization consent form. Allergies, contraindications and recommendations reviewed with patient. High dose seasonal influenza vaccine administered IM left deltoid. Patient tolerated well. Patient remained in office for 15 minutes after injection and no adverse reactions were noted.

## 2022-10-20 NOTE — PROGRESS NOTES
SUBJECTIVE  Chief Complaint   Patient presents with    Results    Hypertension    Cholesterol Problem     Patient presents for follow-up. Hypertension -   Taking meds as prescribed. No side effects. Home BPs usually in the 130/80 range. High this morning. Got poor sleep. Has seen cardiology in the past and had a 2D ECHO. Pre-DM -   Latest A1c with minimal elevation. Patient continues to work on maintaining a healthy weight through diet and regular exercise      Hyperlipidemia -  In the past patient reported poor compliance with Crestor 20 mg qhs but says she is taking it nightly now. Her father had a stroke in his late 62s. Her brother had an MI in his 45s. She denies any cardiac or neuro symptoms to suggest either. Parkinson Disease -   Followed by neurology, had an increase in her Sinemet which improved her tremor and balance difficulties. Spinal Stenosis -   No complaints. Was seeing Dr. Nguyen Elliott at [address] [place] [address] [place]:  Respiratory: Negative for cough, shortness of breath and wheezing. Cardiovascular: Negative for chest pain, palpitations and leg swelling. : chronic nocturia, 3 times per night. History of bladder injury during hysterectomy. OBJECTIVE  Blood pressure 130/80, pulse 69, temperature 98 °F (36.7 °C), temperature source Temporal, resp. rate 14, height 5' 8\" (1.727 m), weight 148 lb (67.1 kg), SpO2 100 %. General:  Alert, cooperative, well appearing, in no apparent distress. Chest:  Clear, no w/r/r. Heart:  Normal S1S2, RRR, no murmurs. Ext: no swelling  Psych: normal affect. Mood good. Oriented x 3. Judgement and insight intact.      Results for orders placed or performed during the hospital encounter of 10/17/22   CBC WITH AUTOMATED DIFF   Result Value Ref Range    WBC 4.6 4.6 - 13.2 K/uL    RBC 3.94 (L) 4.20 - 5.30 M/uL    HGB 11.9 (L) 12.0 - 16.0 g/dL    HCT 36.6 35.0 - 45.0 %    MCV 92.9 78.0 - 100.0 FL    MCH 30.2 24.0 - 34.0 PG    MCHC 32.5 31.0 - 37.0 g/dL    RDW 13.7 11.6 - 14.5 %    PLATELET 896 604 - 026 K/uL    MPV 12.4 (H) 9.2 - 11.8 FL    NRBC 0.0 0  WBC    ABSOLUTE NRBC 0.00 0.00 - 0.01 K/uL    NEUTROPHILS 45 40 - 73 %    LYMPHOCYTES 46 21 - 52 %    MONOCYTES 7 3 - 10 %    EOSINOPHILS 2 0 - 5 %    BASOPHILS 1 0 - 2 %    IMMATURE GRANULOCYTES 0 0.0 - 0.5 %    ABS. NEUTROPHILS 2.1 1.8 - 8.0 K/UL    ABS. LYMPHOCYTES 2.1 0.9 - 3.6 K/UL    ABS. MONOCYTES 0.3 0.05 - 1.2 K/UL    ABS. EOSINOPHILS 0.1 0.0 - 0.4 K/UL    ABS. BASOPHILS 0.0 0.0 - 0.1 K/UL    ABS. IMM. GRANS. 0.0 0.00 - 0.04 K/UL    DF AUTOMATED     METABOLIC PANEL, COMPREHENSIVE   Result Value Ref Range    Sodium 139 136 - 145 mmol/L    Potassium 3.8 3.5 - 5.5 mmol/L    Chloride 104 100 - 111 mmol/L    CO2 30 21 - 32 mmol/L    Anion gap 5 3.0 - 18 mmol/L    Glucose 100 (H) 74 - 99 mg/dL    BUN 21 (H) 7.0 - 18 MG/DL    Creatinine 1.00 0.6 - 1.3 MG/DL    BUN/Creatinine ratio 21 (H) 12 - 20      eGFR >60 >60 ml/min/1.73m2    Calcium 9.9 8.5 - 10.1 MG/DL    Bilirubin, total 0.9 0.2 - 1.0 MG/DL    ALT (SGPT) 44 13 - 56 U/L    AST (SGOT) 25 10 - 38 U/L    Alk. phosphatase 119 (H) 45 - 117 U/L    Protein, total 8.0 6.4 - 8.2 g/dL    Albumin 4.3 3.4 - 5.0 g/dL    Globulin 3.7 2.0 - 4.0 g/dL    A-G Ratio 1.2 0.8 - 1.7     LIPID PANEL   Result Value Ref Range    LIPID PROFILE          Cholesterol, total 184 <200 MG/DL    Triglyceride 58 <150 MG/DL    HDL Cholesterol 84 (H) 40 - 60 MG/DL    LDL, calculated 88.4 0 - 100 MG/DL    VLDL, calculated 11.6 MG/DL    CHOL/HDL Ratio 2.2 0 - 5.0     HEMOGLOBIN A1C W/O EAG   Result Value Ref Range    Hemoglobin A1c 5.9 (H) 4.2 - 5.6 %     ASSESSMENT / PLAN    ICD-10-CM ICD-9-CM    1. Essential hypertension  I10 401.9       2. Dyslipidemia  E78.5 272.4       3. Prediabetes  R73.03 790.29       4. IFG (impaired fasting glucose)  R73.01 790.21       5. Parkinson disease ([facility] [address]  G20 332.0       6. Nocturia  R35.1 788.43       7.  Encounter for immunization  Z23 V03.89 ADMIN INFLUENZA VIRUS VAC      INFLUENZA, FLUAD, (AGE 65 Y+), IM, PF, 0.5 ML        Reviewed latest labs. Essential hypertension  - controlled on diovan-hct and norvasc.  - Meds refilled as needed.  -cont current care      Dyslipidemia / family history of heart disease  - Both HDL and LDL have improved with better compliance  - cont crestor 20mg nightly. - check in 6 months. Prediabetes / IFG  - Historically well-controlled  - Continue to work on diet & exercise  -check A1c in 6 months. Parkinson disease [facility])  - Neurology notes reviewed   - cont Sinemet per neuro  - agree with careplan. - fall precautions     Nocturia   - work on [employer]. - if persists, urology consult     Flu vaccine administered. All chart history elements were reviewed by me at the time of the visit even though marked at time of note closure. Patient understands our medical plan. Patient has provided input and agrees with goals. Alternatives have been explained and offered. All questions answered. The patient is to call if condition worsens or fails to improve. Follow-up and Dispositions    Return in about 6 months (around 4/20/2023) for routine care. A1c to be done in office.

## 2022-10-20 NOTE — PROGRESS NOTES
Chief Complaint   Patient presents with    Annual Wellness Visit     This is the Subsequent Medicare Annual Wellness Exam, performed 12 months or more after the Initial AWV or the last Subsequent AWV    I have reviewed the patient's medical history in detail and updated the computerized patient record. History     Patient Active Problem List   Diagnosis Code    Essential hypertension with goal blood pressure less than 140/90 I10    Osteopenia M85.80    Dyslipidemia E78.5    Prediabetes R73.03    Frequent urination at night R35.1    Leg heaviness R29.898    Parkinson disease ([facility] [address] G20     Past Medical History:   Diagnosis Date    Bladder perforation, intraoperative     during hysterectomy    Hyperlipidemia     Hypertension     Osteopenia     Parkinson disease ([facility] [address]     Prediabetes       Past Surgical History:   Procedure Laterality Date    HX  SECTION      HX JEROD AND BSO      TX EXTRAC ERUPTED TOOTH/EXPOSED ROOT       Current Outpatient Medications   Medication Sig Dispense Refill    carbidopa-levodopa (SINEMET)  mg per tablet Take 1 tab by mouth 4 times a day. Take at 8AM, 12PM, 4PM, 8PM.  Indications: a type of movement disorder called parkinsonism 360 Tablet 1    rosuvastatin (CRESTOR) 20 mg tablet TAKE 1 TABLET BY MOUTH EVERY NIGHT 90 Tablet 1    diclofenac (VOLTAREN) 1 % gel Apply  to affected area as needed for Pain.      valsartan-hydroCHLOROthiazide (DIOVAN-HCT) 160-25 mg per tablet TAKE 1 TABLET BY MOUTH DAILY 90 Tablet 0    amLODIPine (NORVASC) 10 mg tablet TAKE 1 TABLET BY MOUTH DAILY 90 Tablet 1    calcium carbonate/vitamin D3 (CALCIUM + D PO) Take  by mouth. cholecalciferol, VITAMIN D3, (VITAMIN D3) 5,000 unit tab tablet Take  by mouth daily. cyanocobalamin, vitamin B-12, (VITAMIN B-12 PO) Take  by mouth.        No Known Allergies    Family History   Problem Relation Age of Onset    Hypertension Mother     Hypertension Father     Hypertension Brother     Hypertension Brother     Kidney Disease Brother      Social History     Tobacco Use    Smoking status: Never    Smokeless tobacco: Never   Substance Use Topics    Alcohol use: No       Depression Risk Factor Screening:     3 most recent PHQ Screens 10/20/2022   Little interest or pleasure in doing things Not at all   Feeling down, depressed, irritable, or hopeless Not at all   Total Score PHQ 2 0       Alcohol Risk Screen   Do you average more than 1 drink per night or more than 7 drinks a week:  No    On any one occasion in the past three months have you have had more than 3 drinks containing alcohol:  No    Functional Ability and Level of Safety:   Hearing: Hearing is good. Activities of Daily Living: The home contains: no safety equipment. Patient does total self care     Ambulation: with some balance difficulties due to Parkinsonism. Does not have a cane. Fall Risk:  Fall Risk Assessment, last 12 mths 10/20/2022   Able to walk? Yes   Fall in past 12 months? 0   Do you feel unsteady? 1   Are you worried about falling 0   Is TUG test greater than 12 seconds? 0   Is the gait abnormal? 0   Number of falls in past 12 months 0   Fall with injury? 0     Abuse Screen:  Patient is not abused       Cognitive Screening   Has your family/caregiver stated any concerns about your memory: no  Occasionally forgets where she places something or names     Patient Care Team   Patient Care Team:  Obed Sr MD as PCP - General (Family Medicine)  Obed Sr MD as PCP - REHABILITATION [facility] Provider  Nikolas Jimenez NP (Neurology)    Assessment/Plan   Education and counseling provided:  Are appropriate based on today's review and evaluation     ICD-10-CM ICD-9-CM    1. Medicare annual wellness visit, subsequent  Z00.00 V70.0       2. Advanced directives, counseling/discussion  Z71.89 V65.49 ADVANCE CARE PLANNING FIRST 27 MINS        Age and sex specific counseling. Screening for depression and alcoholism.      Advanced directives / end of life planning discussion- advised once again to bring a copy of ACP/living will. Care team updated. Shingles vaccine at local pharmacy advised once again. Medicare recommended screening and prevention test table is filled out, reviewed, and provided to patient. Ordered FIT test for CRCS since she declines colonoscopy. Patient understands our medical plan. Patient has provided input and agrees with goals. All questions answered.

## 2022-10-20 NOTE — ACP (ADVANCE CARE PLANNING)
Advance Care Planning       Advance Care Planning (ACP) Physician/NP/PA (Provider) Conversation        Date of ACP Conversation: 10/20/2022    Conversation Conducted with:   Patient with Decision Making [address] Wing Maker:    Current Designated Health Care Decision Maker:   Primary Decision Maker: Xavier Rodriguez Spouse - [phone]    Care Preferences:    Awaiting ACP/living will to be shared so we can review in depth. 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 Conversation Outcomes / Follow-Up Plan:   Recommend sharing ACP. Review annually.       Length of Voluntary ACP Conversation in minutes:  16 minutes      Geri Branham MD

## 2022-10-20 NOTE — PATIENT INSTRUCTIONS

## 2022-10-25 DIAGNOSIS — I10 ESSENTIAL HYPERTENSION WITH GOAL BLOOD PRESSURE LESS THAN 140/90: Chronic | ICD-10-CM

## 2022-10-25 NOTE — TELEPHONE ENCOUNTER
This patient contacted office for the following prescriptions to be filled:    Last office visit: 10/20/2022  Follow up appointment: 4/20/2023  Medication requested :   Requested Prescriptions     Pending Prescriptions Disp Refills    valsartan-hydroCHLOROthiazide (DIOVAN-HCT) 160-25 mg per tablet 90 Tablet 0     Sig: Take 1 Tablet by mouth daily.      [facility] [phone]

## 2022-10-26 RX ORDER — VALSARTAN AND HYDROCHLOROTHIAZIDE 160; 25 MG/1; MG/1
1 TABLET ORAL DAILY
Qty: 90 TABLET | Refills: 1 | Status: SHIPPED | OUTPATIENT
Start: 2022-10-26

## 2022-10-30 NOTE — TELEPHONE ENCOUNTER
5:69 PM  Susanna Field is a 63 y.o. female  awaiting placement in a psychiatric facility with a diagnosis of personality disorder and anxiety. The patient was reexamined and remains clinically stable. All needs are being met at this time. All questions from the patient and/ or family were answered. The patient will continue to be reassessed intermittently until transfer. Bessie Zamarripa MD    9:24 PM  The psychiatric team has evaluated the patient and recommends admission to the hospital.  However, they do not want to admit her to the psychiatric team without medical clearance from neurology. Telemetry neurology was consulted and Dr. Deana Pineda will evaluate the patient. Dr. Deana Pineda recommends CT, CTA, CT perfusion, and MRI brain with and without contrast as a part of her neurological evaluation. Patient is resting comfortably in the emergency department talking on the phone at this time. 11:45  Patient is medically cleared for psychiatric admission.   Patient is awaiting bed placement at this time Patient was informed of normal lab results and Mirapex sent into her pharmacy for possible restless leg. Patient verbalized understanding of results and instructions. Lab results mailed to patient.

## 2022-10-31 DIAGNOSIS — E78.5 DYSLIPIDEMIA: ICD-10-CM

## 2022-10-31 NOTE — TELEPHONE ENCOUNTER
[facility] CLINICAL PHARMACY: ADHERENCE REVIEW  Identified care gap per [place]: fills at [facility]: ACE/ARB and Statin adherence    Last Visit: 10/20/2022    ASSESSMENT  ACE/ARB ADHERENCE    Insurance Records claims through 10/21/2022 (2021 [place] = n/a%; [facility] [place] =  93%; Passed in 2022): Valsartan/HCTZ 160-25mg last filled on 10/26/2022 for 90 day supply. Next refill due: 01/24/2023    Per  [facility] Portal:   Valsartan/HCTZ 160-25mg last filled on 10/26/2022 for 90 day supply. Per [facility]:   Valsartan/HCTZ 160-25mg last picked up on 10/26/2022 for 90 day supply. 1 refills remaining. Billed through [place]    BP Readings from Last 3 Encounters:   10/20/22 130/80   10/20/22 130/80   10/05/22 (!) 160/80     Estimated Creatinine Clearance: 52.8 mL/min (by C-G formula based on SCr of 1 mg/dL). [id number] Records claims through 10/21/2022 (2021 [place] = n/a%; [facility] [place] =  75%; Potential Fail Date: 11/02/2022 ):   Rosuvastatin 20mg last filled on 08/02/2022 for 90 day supply. Next refill due: 10/31/2022    Per  [facility] Portal:   Rosuvastatin 20mg last filled on 08/02/2022 for 90 day supply. Per [facility]:   Rosuvastatin 20mg last picked up on 11/02/2022 for 90 day supply. 0 refills remaining.  Billed through [facility] Value Date/Time    Cholesterol, total 184 10/17/2022 09:34 AM    HDL Cholesterol 84 (H) 10/17/2022 09:34 AM    LDL, calculated 88.4 10/17/2022 09:34 AM    VLDL, calculated 11.6 10/17/2022 09:34 AM    Triglyceride 58 10/17/2022 09:34 AM    CHOL/HDL Ratio 2.2 10/17/2022 09:34 AM     ALT (SGPT)   Date Value Ref Range Status   10/17/2022 44 13 - 56 U/L Final     AST (SGOT)   Date Value Ref Range Status   10/17/2022 25 10 - 38 U/L Final     The 10-year ASCVD risk score (Eddie FISCHER, et al., 2019) is: 11.9%    Values used to calculate the score:      Age: 81 years      Sex: Female      Is Non- : Yes      Diabetic: No      Tobacco smoker: No      Systolic Blood Pressure: 520 mmHg      Is BP treated: Yes      HDL Cholesterol: 84 MG/DL      Total Cholesterol: 184 MG/DL     PLAN  The following are interventions that have been identified:  - Patient needs refills for Rosuvastatin    No patient out reach planned at this time. Refill was processed at patient request and will be ready for  later today. Patient is out of refills for rosuvastatin and will not have enough to last until next appt. Please reach out to MD for new script to have on file to ensure continued adherence.        Future Appointments   Date Time Provider Maritza Elam   12/8/2022  9:30 AM Lucian Escobedo NP [facility] BS AMB   4/20/2023  8:30 AM Tika Montalvo MD [facility] BS [address]  Direct: [phone]  Department, toll free:[phone], Option 2     For Pharmacy Admin Tracking Only    CPA in place: No  Gap Closed?: Yes  Time Spent (min): 15'

## 2022-11-01 RX ORDER — ROSUVASTATIN CALCIUM 20 MG/1
20 TABLET, COATED ORAL
Qty: 90 TABLET | Refills: 1 | Status: SHIPPED | OUTPATIENT
Start: 2022-11-01 | End: 2022-11-14

## 2022-11-01 NOTE — TELEPHONE ENCOUNTER
Dinh Sykes MD ,    Stephan Petty has been flagged for adherence by Samantha.  I have pended refills for your approval.    Mela Bergeron  PSK:[id number]    Thank you,  Chrissie Painter, PharmD, [facility]   Department, toll free: [phone] Option 2    Requested Prescriptions     Pending Prescriptions Disp Refills    rosuvastatin (CRESTOR) 20 mg tablet 90 Tablet 1     Sig: Take 1 Tablet by mouth nightly.        ================================================================================  For Pharmacy Admin Tracking Only    CPA in place: No  Recommendation Provided To: Provider: 1 via Note to Provider  and Patient/Caregiver: 1 via Telephone  Intervention Detail: Adherence Monitorin and New Rx: 1, reason: Improve Adherence  Gap Closed?: Yes  Intervention Accepted By: Provider: 1 and Patient/Caregiver: 1  Time Spent (min): 20

## 2022-11-10 DIAGNOSIS — E78.5 DYSLIPIDEMIA: ICD-10-CM

## 2022-11-14 RX ORDER — ROSUVASTATIN CALCIUM 20 MG/1
TABLET, COATED ORAL
Qty: 90 TABLET | Refills: 1 | Status: SHIPPED | OUTPATIENT
Start: 2022-11-14

## 2022-12-22 LAB — HEPATITIS C AB,   EXT: NON REACTIVE

## 2023-01-03 ENCOUNTER — HOSPITAL ENCOUNTER (OUTPATIENT)
Dept: LAB | Age: 73
Discharge: HOME | End: 2023-01-03
Payer: MEDICARE

## 2023-01-03 DIAGNOSIS — Z12.11 COLON CANCER SCREENING: ICD-10-CM

## 2023-01-03 PROCEDURE — 36415 COLL VENOUS BLD VENIPUNCTURE: CPT

## 2023-01-03 PROCEDURE — 82274 ASSAY TEST FOR BLOOD FECAL: CPT

## 2023-01-04 LAB — HEMOCCULT STL QL IA: NEGATIVE

## 2023-03-15 ENCOUNTER — OFFICE VISIT (OUTPATIENT)
Age: 73
End: 2023-03-15

## 2023-03-15 VITALS
OXYGEN SATURATION: 99 % | HEART RATE: 64 BPM | BODY MASS INDEX: 22.66 KG/M2 | SYSTOLIC BLOOD PRESSURE: 160 MMHG | DIASTOLIC BLOOD PRESSURE: 80 MMHG | WEIGHT: 149 LBS

## 2023-03-15 DIAGNOSIS — G20 PARKINSON'S DISEASE (HCC): Primary | ICD-10-CM

## 2023-03-15 DIAGNOSIS — G47.52 REM SLEEP BEHAVIOR DISORDER: ICD-10-CM

## 2023-03-15 PROCEDURE — 3078F DIAST BP <80 MM HG: CPT | Performed by: NURSE PRACTITIONER

## 2023-03-15 PROCEDURE — 3074F SYST BP LT 130 MM HG: CPT | Performed by: NURSE PRACTITIONER

## 2023-03-15 PROCEDURE — 1123F ACP DISCUSS/DSCN MKR DOCD: CPT | Performed by: NURSE PRACTITIONER

## 2023-03-15 PROCEDURE — 99213 OFFICE O/P EST LOW 20 MIN: CPT | Performed by: NURSE PRACTITIONER

## 2023-03-15 RX ORDER — MELOXICAM 15 MG/1
TABLET ORAL
COMMUNITY
Start: 2023-03-14

## 2023-03-15 NOTE — PROGRESS NOTES
left.  Initially just on the right. Places her left hand under the pillow on right hand on top due to the tremor. It is better than it was, as well as her walking. Denies imbalance or falls. Denies swallowing issues. Sometimes dizziness/lightheadedness. Was dizzy in the kitchen a few times when changing elevation. Or when standing from the car. She can feel faint. Has to sit back down. It occurs when she gets up from the car. Not drinking a whole lot of fluids. Reports she had a hysterectomy surgery in 2001 or 2002 and had a bladder injury from it which had to be repaired, now she has 2 get up to use the bathroom a lot. Tries to not drink liquids after 6 PM.  Now only getting up once at night. Does not see the urologist anymore. Has coffee in the morning but it does not worsen the tremor. No alcohol use. Finds the tremor might be worse with stress or excitement. It is not getting worse over time, it is pretty steady. Initial BP elevated, 160/80. Still gets hallucinations. She sees bodies of people walking around. They are not talking. It is not scary for her. She only hallucinates at night, sees people without a face. Reports sleeping well, goes to bed at 930 and up at 5. Reports her  said sometimes she acts out her dreams, waves her hands in the air like playing ball. From prior encounter 10/5/2022:  Claudetta Paula Floreen Barrack presents in follow-up for Parkinson's disease. She was seen here in initial evaluation in June 2018 by Dr. Nina Funes for complaint of right leg dragging. She has history of hypertension. It was noted that she noted heaviness of the right leg for the past month or more at that time. It may have been going on for a few months. It may have started after heavy workout with weights. She did not injure herself. No falls or tripping. Some intermittent tremor of the left hand. It was believed that this is early Parkinson's. She was placed on a low-dose Sinemet.

## 2023-03-15 NOTE — PATIENT INSTRUCTIONS
Patient instructions:  -Sleep specialist- [facility]- [phone]  -please let me know if interested in PT to help for balance, walking, dizziness with standing- also we can do the LSVT BIG program at [facility] ([email or web address])

## 2023-04-17 ENCOUNTER — CLINICAL DOCUMENTATION (OUTPATIENT)
Age: 73
End: 2023-04-17

## 2023-04-17 NOTE — PROGRESS NOTES
L/m re new patient sleep ref. Has pt had any prior sleep studies/evals? CPAP? DME? Please ask all sleep questions & verify insurance before making new patient appt. Ref in system.

## 2023-05-02 RX ORDER — AMLODIPINE BESYLATE 10 MG/1
10 TABLET ORAL DAILY
Qty: 90 TABLET | Refills: 0 | Status: SHIPPED | OUTPATIENT
Start: 2023-05-02 | End: 2023-05-25

## 2023-05-02 RX ORDER — VALSARTAN AND HYDROCHLOROTHIAZIDE 160; 25 MG/1; MG/1
1 TABLET ORAL DAILY
Qty: 90 TABLET | Refills: 0 | Status: SHIPPED | OUTPATIENT
Start: 2023-05-02

## 2023-05-15 NOTE — TELEPHONE ENCOUNTER
According to last note, patient admittedly noncompliant but doing better as of last OV. No refill is needed at this time but we will send it. It will likely kick back to us when her current refills run out. [General Appearance - Well Developed] : well developed [General Appearance - Well Nourished] : well nourished [Abdomen Soft] : soft [Penis Abnormality] : normal uncircumcised penis [Exaggerated Use Of Accessory Muscles For Inspiration] : no accessory muscle use [Oriented To Time, Place, And Person] : oriented to person, place, and time [Not Anxious] : not anxious

## 2023-05-24 PROBLEM — E78.00 HYPERCHOLESTEROLEMIA: Status: ACTIVE | Noted: 2023-02-14

## 2023-05-24 RX ORDER — MELOXICAM 7.5 MG/1
7.5 TABLET ORAL DAILY
COMMUNITY
End: 2023-05-25 | Stop reason: DRUGHIGH

## 2023-05-24 RX ORDER — CYANOCOBALAMIN (VITAMIN B-12) 500 MCG
TABLET ORAL
COMMUNITY

## 2023-05-24 RX ORDER — OFLOXACIN 3 MG/ML
1 SOLUTION/ DROPS OPHTHALMIC
COMMUNITY
End: 2023-05-25

## 2023-05-24 NOTE — PATIENT INSTRUCTIONS
Patient Education        A Healthy Lifestyle: Care Instructions  A healthy lifestyle can help you feel good, have more energy, and stay at a weight that's healthy for you. You can share a healthy lifestyle with your friends and family. And you can do it on your own. Eat meals with your friends or family. You could try cooking together. Plan activities with other people. Go for a walk with a friend, try a free online fitness class, or join a sports league. Eat a variety of healthy foods. These include fruits, vegetables, whole grains, low-fat dairy, and lean protein. Choose healthy portions of food. You can use the Nutrition Facts label on food packages as a guide. Eat more fruits and vegetables. You could add vegetables to sandwiches or add fruit to cereal.   Drink water when you are thirsty. Limit soda, juice, and sports drinks. Try to exercise most days. Aim for at least 2½ hours of exercise each week. Keep moving. Work in the garden or take your dog on a walk. Use the stairs instead of the elevator. If you use tobacco or nicotine, try to quit. Ask your doctor about programs and medicines to help you quit. Limit alcohol. Men should have no more than 2 drinks a day. Women should have no more than 1. For some people, no alcohol is the best choice. Follow-up care is a key part of your treatment and safety. Be sure to make and go to all appointments, and call your doctor if you are having problems. It's also a good idea to know your test results and keep a list of the medicines you take. Where can you learn more? Go to http://www.wynne.com/ and enter U807 to learn more about \"A Healthy Lifestyle: Care Instructions. \"  Current as of: November 14, 2022               Content Version: 13.6  © 2430-9343 [employer], [place]. com. cn. Care instructions adapted under license by [facility] ([facility]).  If you have questions about a medical condition or this instruction, always ask your

## 2023-05-24 NOTE — PROGRESS NOTES
Chief Complaint   Patient presents with    Cholesterol Problem    Hypertension    Other     Hx of IFG-Pre-DM and parkinson disease     Knee Pain     C/o left knee pain     Palpitations     Ongoing past 2 weeks       1. \"Have you been to the ER, urgent care clinic since your last visit? Hospitalized since your last visit? \" No    2. \"Have you seen or consulted any other health care providers outside of the [address] [place] [address] since your last visit? \" Yes [facility] for routine care       3. For patients aged 39-70: Has the patient had a colonoscopy / FIT/ Cologuard? Yes - no Care Gap present FIT Test due 01/03/2024      If the patient is female:    4. For patients aged 41-77: Has the patient had a mammogram within the past 2 years? Yes - no Care Gap present      5. For patients aged 21-65: Has the patient had a pap smear?  NA - based on age or sex

## 2023-05-25 ENCOUNTER — OFFICE VISIT (OUTPATIENT)
Age: 73
End: 2023-05-25
Payer: MEDICARE

## 2023-05-25 ENCOUNTER — HOSPITAL ENCOUNTER (OUTPATIENT)
Facility: HOSPITAL | Age: 73
Discharge: HOME | End: 2023-05-25
Payer: MEDICARE

## 2023-05-25 VITALS
OXYGEN SATURATION: 98 % | WEIGHT: 148 LBS | HEART RATE: 77 BPM | HEIGHT: 68 IN | SYSTOLIC BLOOD PRESSURE: 100 MMHG | TEMPERATURE: 98.6 F | DIASTOLIC BLOOD PRESSURE: 60 MMHG | BODY MASS INDEX: 22.43 KG/M2 | RESPIRATION RATE: 16 BRPM

## 2023-05-25 DIAGNOSIS — R00.2 PALPITATIONS: ICD-10-CM

## 2023-05-25 DIAGNOSIS — R73.01 IMPAIRED FASTING GLUCOSE: ICD-10-CM

## 2023-05-25 DIAGNOSIS — M17.12 ARTHRITIS OF LEFT KNEE: ICD-10-CM

## 2023-05-25 DIAGNOSIS — I10 ESSENTIAL (PRIMARY) HYPERTENSION: Primary | ICD-10-CM

## 2023-05-25 DIAGNOSIS — G20 PARKINSON'S DISEASE (HCC): ICD-10-CM

## 2023-05-25 DIAGNOSIS — E78.5 HYPERLIPIDEMIA, UNSPECIFIED HYPERLIPIDEMIA TYPE: ICD-10-CM

## 2023-05-25 LAB
ANION GAP SERPL CALC-SCNC: 3 MMOL/L (ref 3–18)
BUN SERPL-MCNC: 21 MG/DL (ref 7–18)
BUN/CREAT SERPL: 22 (ref 12–20)
CALCIUM SERPL-MCNC: 9.6 MG/DL (ref 8.5–10.1)
CHLORIDE SERPL-SCNC: 106 MMOL/L (ref 100–111)
CO2 SERPL-SCNC: 30 MMOL/L (ref 21–32)
CREAT SERPL-MCNC: 0.94 MG/DL (ref 0.6–1.3)
EKG ATRIAL RATE: 59 BPM
EKG DIAGNOSIS: NORMAL
EKG Q-T INTERVAL: 420 MS
EKG QRS DURATION: 102 MS
EKG QTC CALCULATION (BAZETT): 422 MS
EKG R AXIS: 4 DEGREES
EKG T AXIS: 24 DEGREES
EKG VENTRICULAR RATE: 61 BPM
GLUCOSE SERPL-MCNC: 94 MG/DL (ref 74–99)
HBA1C MFR BLD: 5.8 %
HGB BLD-MCNC: 11.4 G/DL (ref 12–16)
POTASSIUM SERPL-SCNC: 3.9 MMOL/L (ref 3.5–5.5)
SODIUM SERPL-SCNC: 139 MMOL/L (ref 136–145)
TSH SERPL DL<=0.05 MIU/L-ACNC: 0.53 UIU/ML (ref 0.36–3.74)

## 2023-05-25 PROCEDURE — 80048 BASIC METABOLIC PNL TOTAL CA: CPT

## 2023-05-25 PROCEDURE — 93005 ELECTROCARDIOGRAM TRACING: CPT | Performed by: FAMILY MEDICINE

## 2023-05-25 PROCEDURE — PBSHW AMB POC HEMOGLOBIN A1C: Performed by: FAMILY MEDICINE

## 2023-05-25 PROCEDURE — 36415 COLL VENOUS BLD VENIPUNCTURE: CPT

## 2023-05-25 PROCEDURE — 85018 HEMOGLOBIN: CPT

## 2023-05-25 PROCEDURE — 3078F DIAST BP <80 MM HG: CPT | Performed by: FAMILY MEDICINE

## 2023-05-25 PROCEDURE — G8420 CALC BMI NORM PARAMETERS: HCPCS | Performed by: FAMILY MEDICINE

## 2023-05-25 PROCEDURE — G8427 DOCREV CUR MEDS BY ELIG CLIN: HCPCS | Performed by: FAMILY MEDICINE

## 2023-05-25 PROCEDURE — 84443 ASSAY THYROID STIM HORMONE: CPT

## 2023-05-25 PROCEDURE — 1090F PRES/ABSN URINE INCON ASSESS: CPT | Performed by: FAMILY MEDICINE

## 2023-05-25 PROCEDURE — 99214 OFFICE O/P EST MOD 30 MIN: CPT | Performed by: FAMILY MEDICINE

## 2023-05-25 PROCEDURE — 3017F COLORECTAL CA SCREEN DOC REV: CPT | Performed by: FAMILY MEDICINE

## 2023-05-25 PROCEDURE — G8399 PT W/DXA RESULTS DOCUMENT: HCPCS | Performed by: FAMILY MEDICINE

## 2023-05-25 PROCEDURE — 1123F ACP DISCUSS/DSCN MKR DOCD: CPT | Performed by: FAMILY MEDICINE

## 2023-05-25 PROCEDURE — 3074F SYST BP LT 130 MM HG: CPT | Performed by: FAMILY MEDICINE

## 2023-05-25 PROCEDURE — 83036 HEMOGLOBIN GLYCOSYLATED A1C: CPT | Performed by: FAMILY MEDICINE

## 2023-05-25 PROCEDURE — 1036F TOBACCO NON-USER: CPT | Performed by: FAMILY MEDICINE

## 2023-05-25 PROCEDURE — 93010 ELECTROCARDIOGRAM REPORT: CPT | Performed by: INTERNAL MEDICINE

## 2023-05-25 RX ORDER — AMLODIPINE BESYLATE 10 MG/1
5 TABLET ORAL DAILY
Qty: 90 TABLET | Refills: 1 | Status: SHIPPED | OUTPATIENT
Start: 2023-05-25 | End: 2023-05-26 | Stop reason: DRUGHIGH

## 2023-05-25 SDOH — ECONOMIC STABILITY: FOOD INSECURITY: WITHIN THE PAST 12 MONTHS, THE FOOD YOU BOUGHT JUST DIDN'T LAST AND YOU DIDN'T HAVE MONEY TO GET MORE.: NEVER TRUE

## 2023-05-25 SDOH — ECONOMIC STABILITY: FOOD INSECURITY: WITHIN THE PAST 12 MONTHS, YOU WORRIED THAT YOUR FOOD WOULD RUN OUT BEFORE YOU GOT MONEY TO BUY MORE.: NEVER TRUE

## 2023-05-25 SDOH — ECONOMIC STABILITY: HOUSING INSECURITY
IN THE LAST 12 MONTHS, WAS THERE A TIME WHEN YOU DID NOT HAVE A STEADY PLACE TO SLEEP OR SLEPT IN A SHELTER (INCLUDING NOW)?: NO

## 2023-05-25 SDOH — ECONOMIC STABILITY: INCOME INSECURITY: HOW HARD IS IT FOR YOU TO PAY FOR THE VERY BASICS LIKE FOOD, HOUSING, MEDICAL CARE, AND HEATING?: NOT HARD AT ALL

## 2023-05-25 ASSESSMENT — PATIENT HEALTH QUESTIONNAIRE - PHQ9
2. FEELING DOWN, DEPRESSED OR HOPELESS: 0
1. LITTLE INTEREST OR PLEASURE IN DOING THINGS: 0
9. THOUGHTS THAT YOU WOULD BE BETTER OFF DEAD, OR OF HURTING YOURSELF: 0
SUM OF ALL RESPONSES TO PHQ QUESTIONS 1-9: 0
6. FEELING BAD ABOUT YOURSELF - OR THAT YOU ARE A FAILURE OR HAVE LET YOURSELF OR YOUR FAMILY DOWN: 0
5. POOR APPETITE OR OVEREATING: 0
SUM OF ALL RESPONSES TO PHQ QUESTIONS 1-9: 0
3. TROUBLE FALLING OR STAYING ASLEEP: 0
SUM OF ALL RESPONSES TO PHQ QUESTIONS 1-9: 0
4. FEELING TIRED OR HAVING LITTLE ENERGY: 0
8. MOVING OR SPEAKING SO SLOWLY THAT OTHER PEOPLE COULD HAVE NOTICED. OR THE OPPOSITE, BEING SO FIGETY OR RESTLESS THAT YOU HAVE BEEN MOVING AROUND A LOT MORE THAN USUAL: 0
SUM OF ALL RESPONSES TO PHQ QUESTIONS 1-9: 0
10. IF YOU CHECKED OFF ANY PROBLEMS, HOW DIFFICULT HAVE THESE PROBLEMS MADE IT FOR YOU TO DO YOUR WORK, TAKE CARE OF THINGS AT HOME, OR GET ALONG WITH OTHER PEOPLE: 0
7. TROUBLE CONCENTRATING ON THINGS, SUCH AS READING THE NEWSPAPER OR WATCHING TELEVISION: 0
SUM OF ALL RESPONSES TO PHQ9 QUESTIONS 1 & 2: 0

## 2023-05-25 NOTE — PROGRESS NOTES
SUBJECTIVE  Chief Complaint   Patient presents with    Cholesterol Problem    Hypertension    Other     Hx of IFG-Pre-DM and parkinson disease     Knee Pain     C/o left knee pain     Palpitations     Ongoing past 2 weeks      Patient presents for follow-up. New issues - palpitations for a few weeks. Has a few episodes lasting a few minutes, only with going down stairs but not all the time. Has had related dizziness with an episode. No chest pain or dyspnea. No history of similar. Does have some orthostatic symptoms at times like getting out of the car. Left knee pain hurting but due for shots with her ortho. Hypertension -   Taking meds as prescribed. .  Has seen cardiology in the past and had a 2D ECHO. EKG in 2019. Pre-DM -   Latest A1c with minimal elevation. Patient continues to work on maintaining a healthy weight through diet and regular exercise      Hyperlipidemia -  In the past patient reported poor compliance with Crestor 20 mg qhs but says she is taking it nightly now. Her father had a stroke in his late 62s. Her brother had an MI in his 45s. She denies any cardiac or neuro symptoms to suggest either. Parkinson Disease -   Followed by neurology, and is taking Sinemet which improves her tremor and balance difficulties. No falls. Spinal Stenosis -   No complaints. Was seeing Dr. Alvina Cox at [address] [place] [address] [place]:  Respiratory: Negative for cough, shortness of breath and wheezing. Cardiovascular: Negative for chest pain, palpitations and leg swelling. : chronic nocturia, 3 times per night. History of bladder injury during hysterectomy. OBJECTIVE  Blood pressure 100/60, pulse 77, temperature 98.6 °F (37 °C), temperature source Temporal, resp. rate 16, height 5' 8\" (1.727 m), weight 148 lb (67.1 kg), SpO2 98 %. General:  Alert, cooperative, well appearing, in no apparent distress. Chest:  Clear, no w/r/r. Heart:  Normal S1S2, RRR, no murmurs.   Ext: no

## 2023-05-26 DIAGNOSIS — R00.2 PALPITATIONS: Primary | ICD-10-CM

## 2023-05-26 DIAGNOSIS — R94.31 ABNORMAL ECG: ICD-10-CM

## 2023-05-26 RX ORDER — AMLODIPINE BESYLATE 5 MG/1
5 TABLET ORAL DAILY
Qty: 30 TABLET | Refills: 5 | Status: SHIPPED | OUTPATIENT
Start: 2023-05-26

## 2023-06-23 ENCOUNTER — OFFICE VISIT (OUTPATIENT)
Age: 73
End: 2023-06-23
Payer: MEDICARE

## 2023-06-23 VITALS
WEIGHT: 145 LBS | BODY MASS INDEX: 21.98 KG/M2 | HEIGHT: 68 IN | DIASTOLIC BLOOD PRESSURE: 76 MMHG | SYSTOLIC BLOOD PRESSURE: 122 MMHG | HEART RATE: 62 BPM | OXYGEN SATURATION: 100 %

## 2023-06-23 DIAGNOSIS — R94.31 ABNORMAL EKG: Primary | ICD-10-CM

## 2023-06-23 DIAGNOSIS — R00.2 PALPITATION: ICD-10-CM

## 2023-06-23 PROCEDURE — 99215 OFFICE O/P EST HI 40 MIN: CPT | Performed by: INTERNAL MEDICINE

## 2023-06-23 PROCEDURE — 1123F ACP DISCUSS/DSCN MKR DOCD: CPT | Performed by: INTERNAL MEDICINE

## 2023-06-23 PROCEDURE — 93000 ELECTROCARDIOGRAM COMPLETE: CPT | Performed by: INTERNAL MEDICINE

## 2023-06-23 PROCEDURE — 3080F DIAST BP >= 90 MM HG: CPT | Performed by: INTERNAL MEDICINE

## 2023-06-23 PROCEDURE — 3075F SYST BP GE 130 - 139MM HG: CPT | Performed by: INTERNAL MEDICINE

## 2023-06-23 ASSESSMENT — PATIENT HEALTH QUESTIONNAIRE - PHQ9
SUM OF ALL RESPONSES TO PHQ QUESTIONS 1-9: 0
1. LITTLE INTEREST OR PLEASURE IN DOING THINGS: 0
SUM OF ALL RESPONSES TO PHQ9 QUESTIONS 1 & 2: 0
SUM OF ALL RESPONSES TO PHQ QUESTIONS 1-9: 0
2. FEELING DOWN, DEPRESSED OR HOPELESS: 0
SUM OF ALL RESPONSES TO PHQ QUESTIONS 1-9: 0
SUM OF ALL RESPONSES TO PHQ QUESTIONS 1-9: 0

## 2023-06-23 NOTE — PROGRESS NOTES
Jitendra Chen presents today for   Chief Complaint   Patient presents with    Follow-up     Overdue f/u pcp sent over new referral due to palps and abnormal ekg     Chest Pain     Center chest sharp pain 1x only last week     Palpitations     Racing palps with exertion     Dizziness     Dizzy spells on/off       Jitendra Chen preferred language for health care discussion is english/other. Is someone accompanying this pt? no    Is the patient using any DME equipment during OV? no    Depression Screening:  Depression: Not at risk    PHQ-2 Score: 0        Learning Assessment:  No question data found. Pt currently taking Anticoagulant therapy? no    Pt currently taking Antiplatelet therapy ? no      Coordination of Care:  1. Have you been to the ER, urgent care clinic since your last visit? Hospitalized since your last visit? no    2. Have you seen or consulted any other health care providers outside of the [address] [place] [address] since your last visit? Include any pap smears or colon screening.  no

## 2023-06-23 NOTE — PROGRESS NOTES
Ronald Kaur    Chief Complaint   Patient presents with    Follow-up     Overdue f/u pcp sent over new referral due to palps and abnormal ekg     Chest Pain     Center chest sharp pain 1x only last week     Palpitations     Racing palps with exertion     Dizziness     Dizzy spells on/off       HPI    Ronald Kaur is a 72 y.o. extremely pleasant male with no known coronary disease who was referred back by her primary care physician due to chest pain palpitations dizziness but also concerns for EKG changes and hypertension. As you know this patient was formally diagnosed with Parkinson's and more recently has been on Sinemet. This has improved her tremor and her balance still feels quite dizzy at times. I am aware that more recently she complained of palpitations to you does not even mention these today until I ask her it does not seem like it is happening that often a hard time really describing it. Bothering her really is the dizziness other than that I did ask her about chest pain she said it happened maybe once last week it was sharp and in the center of her chest at rest there were no other associated symptoms.     Past Medical History:   Diagnosis Date    Bladder perforation, intraoperative     during hysterectomy    Hyperlipidemia     Hypertension     Osteopenia     Parkinson disease ([facility] [address]     Prediabetes        Past Surgical History:   Procedure Laterality Date     SECTION      EXTRAC ERUPTED TOOTH/EXPOSED ROOT      YAMILETH AND BSO (CERVIX REMOVED)         Current Outpatient Medications   Medication Sig Dispense Refill    amLODIPine (NORVASC) 5 MG tablet Take 1 tablet by mouth daily 30 tablet 5    Cyanocobalamin (VITAMIN B 12) 500 MCG TABS Take 500 mcg by mouth daily      valsartan-hydroCHLOROthiazide (DIOVAN-HCT) 160-25 MG per tablet Take 1 tablet by mouth daily 90 tablet 0    meloxicam (MOBIC) 15 MG tablet       carbidopa-levodopa (SINEMET)  MG per tablet Take 1 tab by mouth 4

## 2023-06-30 DIAGNOSIS — E78.5 HYPERLIPIDEMIA, UNSPECIFIED HYPERLIPIDEMIA TYPE: ICD-10-CM

## 2023-06-30 DIAGNOSIS — I10 ESSENTIAL (PRIMARY) HYPERTENSION: Primary | ICD-10-CM

## 2023-06-30 DIAGNOSIS — R73.01 IMPAIRED FASTING GLUCOSE: ICD-10-CM

## 2023-06-30 DIAGNOSIS — D50.9 IRON DEFICIENCY ANEMIA, UNSPECIFIED IRON DEFICIENCY ANEMIA TYPE: ICD-10-CM

## 2023-07-20 NOTE — TELEPHONE ENCOUNTER
This patient contacted office for the following prescriptions to be filled:    Medication requested : amLODIPine (NORVASC) 5 MG tablet         Qty 90  valsartan-hydroCHLOROthiazide (DIOVAN-HCT) 160-25 MG per tablet          Qty 90  PCP: [address] or Print:  [facility]   Mail order or Local pharmacy [id number] [facility]     Scheduled appointment if not seen by current providers in office: LOV 5/25/2023 JUNIE 11/28/2023

## 2023-07-24 RX ORDER — VALSARTAN AND HYDROCHLOROTHIAZIDE 160; 25 MG/1; MG/1
1 TABLET ORAL DAILY
Qty: 90 TABLET | Refills: 1 | Status: SHIPPED | OUTPATIENT
Start: 2023-07-24

## 2023-07-24 RX ORDER — AMLODIPINE BESYLATE 5 MG/1
5 TABLET ORAL DAILY
Qty: 90 TABLET | Refills: 1 | Status: SHIPPED | OUTPATIENT
Start: 2023-07-24

## 2023-08-01 ENCOUNTER — TRANSCRIBE ORDERS (OUTPATIENT)
Facility: HOSPITAL | Age: 73
End: 2023-08-01

## 2023-08-01 DIAGNOSIS — Z12.31 VISIT FOR SCREENING MAMMOGRAM: Primary | ICD-10-CM

## 2023-08-30 ENCOUNTER — HOSPITAL ENCOUNTER (OUTPATIENT)
Facility: HOSPITAL | Age: 73
Discharge: HOME | End: 2023-09-02
Attending: FAMILY MEDICINE
Payer: MEDICARE

## 2023-08-30 DIAGNOSIS — Z12.31 VISIT FOR SCREENING MAMMOGRAM: ICD-10-CM

## 2023-08-30 PROCEDURE — 77063 BREAST TOMOSYNTHESIS BI: CPT

## 2023-09-14 ENCOUNTER — TELEPHONE (OUTPATIENT)
Age: 73
End: 2023-09-14

## 2023-09-14 NOTE — TELEPHONE ENCOUNTER
----- Message from Prem Bina sent at 9/14/2023  8:58 AM EDT -----  Subject: Results Request    QUESTIONS  Results: Mammogram ; Ordered by:  Wilfredo Rome   Date Performed: 2023-08-30  ---------------------------------------------------------------------------  --------------  [employer] INFO    [phone]; OK to leave message on voicemail  ---------------------------------------------------------------------------  --------------

## 2023-09-14 NOTE — TELEPHONE ENCOUNTER
Spoke with  Jackelin Neelam who is requesting the results of her mammogram from 08/30/2023.  Please advise

## 2023-09-14 NOTE — TELEPHONE ENCOUNTER
2 patient identifiers verified (name/) with Mrs. Mendez Monday . Mrs. Clau Rodriguez was advised that her mammogram was normal to repeat in 1 year per Dr. Pamella Bobby. No further questions at this time.

## 2023-09-26 ENCOUNTER — TELEPHONE (OUTPATIENT)
Age: 73
End: 2023-09-26

## 2023-09-26 NOTE — TELEPHONE ENCOUNTER
----- Message from Rikki Pham sent at 9/26/2023 11:04 AM EDT -----  Subject: Message to Provider    QUESTIONS  Information for Provider? Patient is calling in to find when she was in   for the flu shot. I am not seeing a visit for flu shout. Please advise  ---------------------------------------------------------------------------  --------------  Marisel MAR  [phone]; OK to leave message on voicemail  ---------------------------------------------------------------------------  --------------  SCRIPT ANSWERS  Relationship to Patient?  Self

## 2023-09-26 NOTE — TELEPHONE ENCOUNTER
Spoke with  Moriah Shante who was advised that her last influenza vaccine was given on 10/20/2022 and that she is due to receive one for 2023. / Gail Ndiaye voice understanding.

## 2023-10-12 ENCOUNTER — TELEPHONE (OUTPATIENT)
Age: 73
End: 2023-10-12

## 2023-10-12 NOTE — TELEPHONE ENCOUNTER
Patient called and states that she has to get up and use the restroom several times in the middle of the night and its interrupting her sleep. Patient states she is going on a bus ride and would like to know if something can be prescribed to help her through the night. Please advise.

## 2023-10-13 NOTE — TELEPHONE ENCOUNTER
LMOV that it is challenging to prescribe something over the phone for this. Need to know a lot more to determine the cause. Also I am curious if she sees gynecology. I am aware she had a hysterectomy and that she had a bladder perforation. So I wonder if she has seen or has a urologist as well. I encouraged her to call back or to schedule or to start a [employer] message dialogue.

## 2023-12-20 PROBLEM — G20.A1 PARKINSON'S DISEASE: Status: ACTIVE | Noted: 2023-12-20

## 2023-12-29 ENCOUNTER — HOSPITAL ENCOUNTER (OUTPATIENT)
Facility: HOSPITAL | Age: 73
End: 2023-12-29
Payer: MEDICARE

## 2023-12-29 DIAGNOSIS — R73.01 IMPAIRED FASTING GLUCOSE: ICD-10-CM

## 2023-12-29 DIAGNOSIS — E78.5 HYPERLIPIDEMIA, UNSPECIFIED HYPERLIPIDEMIA TYPE: ICD-10-CM

## 2023-12-29 DIAGNOSIS — D50.9 IRON DEFICIENCY ANEMIA, UNSPECIFIED IRON DEFICIENCY ANEMIA TYPE: ICD-10-CM

## 2023-12-29 DIAGNOSIS — I10 ESSENTIAL (PRIMARY) HYPERTENSION: ICD-10-CM

## 2023-12-29 LAB
ALBUMIN SERPL-MCNC: 3.7 G/DL (ref 3.4–5)
ALBUMIN/GLOB SERPL: 1.2 (ref 0.8–1.7)
ALP SERPL-CCNC: 93 U/L (ref 45–117)
ALT SERPL-CCNC: 10 U/L (ref 13–56)
ANION GAP SERPL CALC-SCNC: 5 MMOL/L (ref 3–18)
AST SERPL-CCNC: 19 U/L (ref 10–38)
BILIRUB SERPL-MCNC: 0.6 MG/DL (ref 0.2–1)
BUN SERPL-MCNC: 24 MG/DL (ref 7–18)
BUN/CREAT SERPL: 24 (ref 12–20)
CALCIUM SERPL-MCNC: 9.1 MG/DL (ref 8.5–10.1)
CHLORIDE SERPL-SCNC: 108 MMOL/L (ref 100–111)
CHOLEST SERPL-MCNC: 257 MG/DL
CO2 SERPL-SCNC: 29 MMOL/L (ref 21–32)
CREAT SERPL-MCNC: 1 MG/DL (ref 0.6–1.3)
ERYTHROCYTE [DISTWIDTH] IN BLOOD BY AUTOMATED COUNT: 13.4 % (ref 11.6–14.5)
EST. AVERAGE GLUCOSE BLD GHB EST-MCNC: 117 MG/DL
FERRITIN SERPL-MCNC: 215 NG/ML (ref 8–388)
GLOBULIN SER CALC-MCNC: 3.1 G/DL (ref 2–4)
GLUCOSE SERPL-MCNC: 96 MG/DL (ref 74–99)
HBA1C MFR BLD: 5.7 % (ref 4.2–5.6)
HCT VFR BLD AUTO: 33.1 % (ref 35–45)
HDLC SERPL-MCNC: 76 MG/DL (ref 40–60)
HDLC SERPL: 3.4 (ref 0–5)
HGB BLD-MCNC: 10.6 G/DL (ref 12–16)
IRON SATN MFR SERPL: 30 % (ref 20–50)
IRON SERPL-MCNC: 78 UG/DL (ref 50–175)
LDLC SERPL CALC-MCNC: 171.2 MG/DL (ref 0–100)
LIPID PANEL: ABNORMAL
MCH RBC QN AUTO: 30.5 PG (ref 24–34)
MCHC RBC AUTO-ENTMCNC: 32 G/DL (ref 31–37)
MCV RBC AUTO: 95.1 FL (ref 78–100)
NRBC # BLD: 0 K/UL (ref 0–0.01)
NRBC BLD-RTO: 0 PER 100 WBC
PLATELET # BLD AUTO: 165 K/UL (ref 135–420)
PMV BLD AUTO: 12.3 FL (ref 9.2–11.8)
POTASSIUM SERPL-SCNC: 4.1 MMOL/L (ref 3.5–5.5)
PROT SERPL-MCNC: 6.8 G/DL (ref 6.4–8.2)
RBC # BLD AUTO: 3.48 M/UL (ref 4.2–5.3)
SODIUM SERPL-SCNC: 142 MMOL/L (ref 136–145)
TIBC SERPL-MCNC: 262 UG/DL (ref 250–450)
TRIGL SERPL-MCNC: 49 MG/DL
VLDLC SERPL CALC-MCNC: 9.8 MG/DL
WBC # BLD AUTO: 5.6 K/UL (ref 4.6–13.2)

## 2023-12-29 PROCEDURE — 82728 ASSAY OF FERRITIN: CPT

## 2023-12-29 PROCEDURE — 36415 COLL VENOUS BLD VENIPUNCTURE: CPT

## 2023-12-29 PROCEDURE — 85027 COMPLETE CBC AUTOMATED: CPT

## 2023-12-29 PROCEDURE — 83036 HEMOGLOBIN GLYCOSYLATED A1C: CPT

## 2023-12-29 PROCEDURE — 80061 LIPID PANEL: CPT

## 2023-12-29 PROCEDURE — 83550 IRON BINDING TEST: CPT

## 2023-12-29 PROCEDURE — 80053 COMPREHEN METABOLIC PANEL: CPT

## 2023-12-29 PROCEDURE — 83540 ASSAY OF IRON: CPT

## 2024-01-08 NOTE — PROGRESS NOTES
Chief Complaint   Patient presents with    Medicare AWV      1. \"Have you been to the ER, urgent care clinic since your last visit?  Hospitalized since your last visit?\" No    2. \"Have you seen or consulted any other health care providers outside of the [facility] since your last visit?\" No     3. For patients aged 45-75: Has the patient had a colonoscopy / FIT/ Cologuard? Yes - Care Gap present. Most recent result on file FIT TEST was due 01/03/2024      If the patient is female:    4. For patients aged 40-74: Has the patient had a mammogram within the past 2 years? Yes - no Care Gap present due 08/30/2024      5. For patients aged 21-65: Has the patient had a pap smear? NA - based on age or sex

## 2024-01-08 NOTE — PATIENT INSTRUCTIONS
include candy, desserts, and soda pop.   Lifestyle changes    If your doctor recommends it, get more exercise. Walking is a good choice. Bit by bit, increase the amount you walk every day. Try for at least 30 minutes on most days of the week. You also may want to swim, bike, or do other activities.     Do not smoke. If you need help quitting, talk to your doctor about stop-smoking programs and medicines. These can increase your chances of quitting for good. Quitting smoking may be the most important step you can take to protect your heart. It is never too late to quit.     Limit alcohol to 2 drinks a day for men and 1 drink a day for women. Too much alcohol can cause health problems.     Manage other health problems such as diabetes, high blood pressure, and high cholesterol. If you think you may have a problem with alcohol or drug use, talk to your doctor.   Medicines    Take your medicines exactly as prescribed. Call your doctor if you think you are having a problem with your medicine.     If your doctor recommends aspirin, take the amount directed each day. Make sure you take aspirin and not another kind of pain reliever, such as acetaminophen (Tylenol).   When should you call for help?   Call 911 if you have symptoms of a heart attack. These may include:    Chest pain or pressure, or a strange feeling in the chest.     Sweating.     Shortness of breath.     Pain, pressure, or a strange feeling in the back, neck, jaw, or upper belly or in one or both shoulders or arms.     Lightheadedness or sudden weakness.     A fast or irregular heartbeat.   After you call 911, the  may tell you to chew 1 adult-strength or 2 to 4 low-dose aspirin. Wait for an ambulance. Do not try to drive yourself.  Watch closely for changes in your health, and be sure to contact your doctor if you have any problems.  Where can you learn more?  Go to https://www.healthwise.net/patientEd and enter F075 to learn more about \"A Healthy

## 2024-01-09 ENCOUNTER — OFFICE VISIT (OUTPATIENT)
Age: 74
End: 2024-01-09

## 2024-01-09 ENCOUNTER — OFFICE VISIT (OUTPATIENT)
Age: 74
End: 2024-01-09
Payer: MEDICARE

## 2024-01-09 VITALS
HEIGHT: 68 IN | HEART RATE: 61 BPM | SYSTOLIC BLOOD PRESSURE: 104 MMHG | WEIGHT: 140.56 LBS | DIASTOLIC BLOOD PRESSURE: 72 MMHG | OXYGEN SATURATION: 98 % | BODY MASS INDEX: 21.3 KG/M2 | TEMPERATURE: 98.6 F | RESPIRATION RATE: 16 BRPM

## 2024-01-09 VITALS
HEIGHT: 68 IN | WEIGHT: 140.56 LBS | TEMPERATURE: 98.6 F | SYSTOLIC BLOOD PRESSURE: 104 MMHG | RESPIRATION RATE: 16 BRPM | BODY MASS INDEX: 21.3 KG/M2 | DIASTOLIC BLOOD PRESSURE: 72 MMHG | HEART RATE: 61 BPM | OXYGEN SATURATION: 98 %

## 2024-01-09 DIAGNOSIS — R73.01 IMPAIRED FASTING GLUCOSE: ICD-10-CM

## 2024-01-09 DIAGNOSIS — R00.2 PALPITATIONS: ICD-10-CM

## 2024-01-09 DIAGNOSIS — I10 ESSENTIAL (PRIMARY) HYPERTENSION: Primary | ICD-10-CM

## 2024-01-09 DIAGNOSIS — E78.5 HYPERLIPIDEMIA, UNSPECIFIED HYPERLIPIDEMIA TYPE: ICD-10-CM

## 2024-01-09 DIAGNOSIS — Z12.11 COLON CANCER SCREENING: ICD-10-CM

## 2024-01-09 DIAGNOSIS — Z71.89 ACP (ADVANCE CARE PLANNING): ICD-10-CM

## 2024-01-09 DIAGNOSIS — R35.0 URINARY FREQUENCY: ICD-10-CM

## 2024-01-09 DIAGNOSIS — M17.12 ARTHRITIS OF LEFT KNEE: ICD-10-CM

## 2024-01-09 DIAGNOSIS — G20.A1 PARKINSON'S DISEASE, UNSPECIFIED WHETHER DYSKINESIA PRESENT, UNSPECIFIED WHETHER MANIFESTATIONS FLUCTUATE: ICD-10-CM

## 2024-01-09 DIAGNOSIS — Z00.00 MEDICARE ANNUAL WELLNESS VISIT, SUBSEQUENT: Primary | ICD-10-CM

## 2024-01-09 DIAGNOSIS — D64.9 ANEMIA, UNSPECIFIED TYPE: ICD-10-CM

## 2024-01-09 PROCEDURE — 1123F ACP DISCUSS/DSCN MKR DOCD: CPT | Performed by: FAMILY MEDICINE

## 2024-01-09 PROCEDURE — 3074F SYST BP LT 130 MM HG: CPT | Performed by: FAMILY MEDICINE

## 2024-01-09 PROCEDURE — 99214 OFFICE O/P EST MOD 30 MIN: CPT | Performed by: FAMILY MEDICINE

## 2024-01-09 PROCEDURE — 99497 ADVNCD CARE PLAN 30 MIN: CPT | Performed by: FAMILY MEDICINE

## 2024-01-09 PROCEDURE — G0439 PPPS, SUBSEQ VISIT: HCPCS | Performed by: FAMILY MEDICINE

## 2024-01-09 PROCEDURE — 3078F DIAST BP <80 MM HG: CPT | Performed by: FAMILY MEDICINE

## 2024-01-09 ASSESSMENT — PATIENT HEALTH QUESTIONNAIRE - PHQ9
SUM OF ALL RESPONSES TO PHQ9 QUESTIONS 1 & 2: 0
SUM OF ALL RESPONSES TO PHQ QUESTIONS 1-9: 0
4. FEELING TIRED OR HAVING LITTLE ENERGY: 0
6. FEELING BAD ABOUT YOURSELF - OR THAT YOU ARE A FAILURE OR HAVE LET YOURSELF OR YOUR FAMILY DOWN: 0
10. IF YOU CHECKED OFF ANY PROBLEMS, HOW DIFFICULT HAVE THESE PROBLEMS MADE IT FOR YOU TO DO YOUR WORK, TAKE CARE OF THINGS AT HOME, OR GET ALONG WITH OTHER PEOPLE: 0
3. TROUBLE FALLING OR STAYING ASLEEP: 0
SUM OF ALL RESPONSES TO PHQ QUESTIONS 1-9: 0
1. LITTLE INTEREST OR PLEASURE IN DOING THINGS: 0
9. THOUGHTS THAT YOU WOULD BE BETTER OFF DEAD, OR OF HURTING YOURSELF: 0
7. TROUBLE CONCENTRATING ON THINGS, SUCH AS READING THE NEWSPAPER OR WATCHING TELEVISION: 0
SUM OF ALL RESPONSES TO PHQ QUESTIONS 1-9: 0
SUM OF ALL RESPONSES TO PHQ QUESTIONS 1-9: 0
5. POOR APPETITE OR OVEREATING: 0
2. FEELING DOWN, DEPRESSED OR HOPELESS: 0
8. MOVING OR SPEAKING SO SLOWLY THAT OTHER PEOPLE COULD HAVE NOTICED. OR THE OPPOSITE, BEING SO FIGETY OR RESTLESS THAT YOU HAVE BEEN MOVING AROUND A LOT MORE THAN USUAL: 0

## 2024-01-09 ASSESSMENT — LIFESTYLE VARIABLES
HOW MANY STANDARD DRINKS CONTAINING ALCOHOL DO YOU HAVE ON A TYPICAL DAY: PATIENT DOES NOT DRINK
HOW OFTEN DO YOU HAVE A DRINK CONTAINING ALCOHOL: NEVER

## 2024-01-09 NOTE — ACP (ADVANCE CARE PLANNING)
Advance Care Planning       Advance Care Planning (ACP) Physician/NP/PA (Provider) Conversation        Date of ACP Conversation: 1/9/2024    Conversation Conducted with:   Patient with Decision Making Capacity    Health Care Decision Maker:    Current Designated Health Care Decision Maker:         Primary Decision Maker: Fernanda Hirscholph - Spouse - [phone]    Care Preferences:    Awaiting ACP/living will to be shared so we can review in depth.     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      Conversation Outcomes / Follow-Up Plan:   Recommend meeting with an ACP nurse to draft one.   She wants to check with her  first.  Review annually.      Length of Voluntary ACP Conversation in minutes:  16 minutes      Jayesh Kelly MD

## 2024-01-09 NOTE — PROGRESS NOTES
Chief Complaint   Patient presents with    Medicare AW       Medicare Annual Wellness Visit    Marielena Hirsch is here for Medicare AW    Assessment & Plan   Medicare annual wellness visit, subsequent  ACP (advance care planning)  -     [place] Advanced Care Planning (16-30 minutes) [14352]  Recommendations for Preventive Services Due: see orders and patient instructions/AVS.  Recommended screening schedule for the next 5-10 years is provided to the patient in written form: see Patient Instructions/AVS.     AWV annually.  ACP notes.   Ordered CRCS by method of patient choice - FIT testing.    All chart history elements were reviewed by me at the time of the visit even though marked at time of note closure. Patient understands our medical plan. Patient has provided input and agrees with goals. Alternatives have been explained and offered.  All questions answered.  The patient is to call if condition worsens or fails to improve.          Subjective     Patient's complete Health Risk Assessment and screening values have been reviewed and are found in Flowsheets. The following problems were reviewed today and where indicated follow up appointments were made and/or referrals ordered.    Positive Risk Factor Screenings with Interventions:                      Advanced Directives:  Do you have a Living Will?: (!) No    Intervention:  see ACP note    Advance Care Planning   Advanced Care Planning: Discussed the patient’s choices for care and treatment in case of a health event that adversely affects decision-making abilities. Also discussed the patient’s long-term treatment options. Reviewed with the patient the appropriate state-specific advance directive documents. Reviewed the process of designating a competent adult as an Agent (or -in-fact) that could take make health care decisions for the patient if incompetent. Patient was asked to complete the declaration forms, if they have not already, either acknowledge

## 2024-01-09 NOTE — PATIENT INSTRUCTIONS

## 2024-01-09 NOTE — PROGRESS NOTES
Chief Complaint   Patient presents with    Results     Review of results     Anemia    Cholesterol Problem    Hypertension    IFG    PARKINSON Disease     SPINAL Stenosis      1. \"Have you been to the ER, urgent care clinic since your last visit?  Hospitalized since your last visit?\" No    2. \"Have you seen or consulted any other health care providers outside of the [facility] since your last visit?\" No     3. For patients aged 45-75: Has the patient had a colonoscopy / FIT/ Cologuard? Yes - Care Gap present. Most recent result on file FIT TEST was due 01/03/2024      If the patient is female:    4. For patients aged 40-74: Has the patient had a mammogram within the past 2 years? Yes - no Care Gap present due 08/30/2024      5. For patients aged 21-65: Has the patient had a pap smear? NA - based on age or sex  
100 - 111 mmol/L 108   CO2 21 - 32 mmol/L 29   BUN,BUNPL 7.0 - 18 MG/DL 24 (H)   Creatinine 0.6 - 1.3 MG/DL 1.00   Bun/Cre Ratio 12 - 20   24 (H)   Anion Gap 3.0 - 18 mmol/L 5   Est, Glom Filt Rate >60 ml/min/1.73m2 >60   Glucose, Random 74 - 99 mg/dL 96   CALCIUM, SERUM, 486809 8.5 - 10.1 MG/DL 9.1   ALBUMIN/GLOBULIN RATIO 0.8 - 1.7   1.2   Total Protein 6.4 - 8.2 g/dL 6.8   LIPID PANEL -       Chol/HDL Ratio 0 - 5.0   3.4   Cholesterol, Total <200 MG/ (H)   HDL Cholesterol 40 - 60 MG/DL 76 (H)   LDL Calculated 0 - 100 MG/.2 (H)   Triglycerides <150 MG/DL 49   VLDL Cholesterol Calculated MG/DL 9.8   Albumin 3.4 - 5.0 g/dL 3.7   Globulin 2.0 - 4.0 g/dL 3.1   Alk Phos 45 - 117 U/L 93   ALT 13 - 56 U/L 10 (L)   AST 10 - 38 U/L 19   BILIRUBIN TOTAL 0.2 - 1.0 MG/DL 0.6   Hemoglobin A1C 4.2 - 5.6 % 5.7 (H)   eAG (mg/dL) mg/dL 117   WBC 4.6 - 13.2 K/uL 5.6   RBC 4.20 - 5.30 M/uL 3.48 (L)   Hemoglobin Quant 12.0 - 16.0 g/dL 10.6 (L)   Hematocrit 35.0 - 45.0 % 33.1 (L)   MCV 78.0 - 100.0 FL 95.1   MCH 24.0 - 34.0 PG 30.5   MCHC 31.0 - 37.0 g/dL 32.0   MPV 9.2 - 11.8 FL 12.3 (H)   RDW 11.6 - 14.5 % 13.4   Platelet Count 135 - 420 K/uL 165   Nucleated Red Blood Cells 0  WBC  0.00 - 0.01 K/uL 0.0  0.00   Ferritin 8 - 388 NG/   Iron 50 - 175 ug/dL 78   TIBC 250 - 450 ug/dL 262   Iron % Saturation 20 - 50 % 30   (H): Data is abnormally high  (L): Data is abnormally low      ASSESSMENT / PLAN   Diagnosis Orders   1. Essential (primary) hypertension        2. Palpitations        3. Hyperlipidemia, unspecified hyperlipidemia type        4. Impaired fasting glucose  Hemoglobin A1C      5. Parkinson's disease, unspecified whether dyskinesia present, unspecified whether manifestations fluctuate        6. Arthritis of left knee        7. Anemia, unspecified type  Hemoglobin      8. Urinary frequency          Reviewed latest labs.    Essential hypertension  Palpitations   - controlled on diovan-hct and

## 2024-01-19 RX ORDER — VALSARTAN AND HYDROCHLOROTHIAZIDE 160; 25 MG/1; MG/1
1 TABLET ORAL DAILY
Qty: 90 TABLET | Refills: 1 | Status: SHIPPED | OUTPATIENT
Start: 2024-01-19

## 2024-01-19 NOTE — TELEPHONE ENCOUNTER
/This pharmacy faxed over request for the following prescriptions to be filled:    Medication requested :   valsartan-hydroCHLOROthiazide (DIOVAN-HCT) 160-25 MG per tablet   QTY 90 Refill 1  PCP: Leticia  Pharmacy or Print: [facility]  Mail order or Local pharmacy [address]      Scheduled appointment if not seen by current providers in office: LOV 1/9/2024 FU 7/9/2024

## 2024-01-20 LAB — HEMOCCULT STL QL IA: NEGATIVE

## 2024-02-01 ENCOUNTER — TELEPHONE (OUTPATIENT)
Age: 74
End: 2024-02-01

## 2024-02-01 NOTE — TELEPHONE ENCOUNTER
Pt. Called wanting to speak with the NP. Regarding her visit with her PCP. She stated that her tremors in her hand has become more intense and he suggested a medication and she wanted to speak with Joi tejada

## 2024-02-02 NOTE — TELEPHONE ENCOUNTER
Returned call to patient but it went to voicemail.  Voicemail message left to call back the office or suggested she can send a MyChart message with the question about the medication.

## 2024-03-12 ENCOUNTER — TELEPHONE (OUTPATIENT)
Age: 74
End: 2024-03-12

## 2024-03-12 NOTE — TELEPHONE ENCOUNTER
----- Message from April Karishma sent at 3/12/2024  8:45 AM EDT -----  Subject: Results Request    QUESTIONS  Results: Occult Blood Stool Immunoassay; Ordered by: Jayesh Kelly   Date Performed: 2024-01-10  ---------------------------------------------------------------------------  --------------  CALL BACK INFO    [phone]; OK to leave message on voicemail  ---------------------------------------------------------------------------  --------------

## 2024-03-12 NOTE — TELEPHONE ENCOUNTER
Place return call to Mrs. Marielena Hirsch, 2 patient identifiers verified (name/). Mr. Hirsch was advised that her Occult Blood Stool Immunoassay results were negative. No further questions at this time.

## 2024-03-27 ENCOUNTER — TELEPHONE (OUTPATIENT)
Age: 74
End: 2024-03-27

## 2024-03-27 NOTE — TELEPHONE ENCOUNTER
Patient would like a call back bladder problem ? Stated she left a message a while ago but didn't receive a call back

## 2024-03-27 NOTE — TELEPHONE ENCOUNTER
Spoke to patient and she stated that Dr. Kelly told her to call in regards to this matter (urinary frequency) but in her note from 1/19/24 it stated for her to call Neurology if she started the medication. Please advise.

## 2024-05-29 NOTE — TELEPHONE ENCOUNTER
This pharmacy faxed over request for the following prescriptions to be filled:    Medication requested : amLODIPine (NORVASC) 5 MG tablet QTY 90   amLODIPine (NORVASC) 10 MG tablet QTY 90  PCP: ROMÁN  Pharmacy or Print: [facility]   Mail order or Local pharmacy [address]    Scheduled appointment if not seen by current providers in office: LOV 1/9/2024 FU 7/9/2024

## 2024-05-30 RX ORDER — AMLODIPINE BESYLATE 5 MG/1
5 TABLET ORAL DAILY
Qty: 90 TABLET | Refills: 0 | Status: SHIPPED | OUTPATIENT
Start: 2024-05-30

## 2024-06-24 ENCOUNTER — OFFICE VISIT (OUTPATIENT)
Age: 74
End: 2024-06-24
Payer: MEDICARE

## 2024-06-24 VITALS
OXYGEN SATURATION: 99 % | WEIGHT: 139.6 LBS | HEART RATE: 75 BPM | RESPIRATION RATE: 16 BRPM | BODY MASS INDEX: 21.16 KG/M2 | TEMPERATURE: 97.5 F | DIASTOLIC BLOOD PRESSURE: 78 MMHG | HEIGHT: 68 IN | SYSTOLIC BLOOD PRESSURE: 118 MMHG

## 2024-06-24 DIAGNOSIS — G20.A1 PARKINSON'S DISEASE WITHOUT DYSKINESIA OR FLUCTUATING MANIFESTATIONS (HCC): ICD-10-CM

## 2024-06-24 DIAGNOSIS — G20.C PARKINSONISM, UNSPECIFIED PARKINSONISM TYPE (HCC): Primary | ICD-10-CM

## 2024-06-24 DIAGNOSIS — R26.89 IMBALANCE: ICD-10-CM

## 2024-06-24 DIAGNOSIS — R25.1 TREMOR, UNSPECIFIED: ICD-10-CM

## 2024-06-24 DIAGNOSIS — R42 DIZZINESS: ICD-10-CM

## 2024-06-24 DIAGNOSIS — R41.9 COGNITIVE COMPLAINTS: ICD-10-CM

## 2024-06-24 PROCEDURE — 1123F ACP DISCUSS/DSCN MKR DOCD: CPT | Performed by: NURSE PRACTITIONER

## 2024-06-24 PROCEDURE — 3078F DIAST BP <80 MM HG: CPT | Performed by: NURSE PRACTITIONER

## 2024-06-24 PROCEDURE — 3074F SYST BP LT 130 MM HG: CPT | Performed by: NURSE PRACTITIONER

## 2024-06-24 PROCEDURE — 99213 OFFICE O/P EST LOW 20 MIN: CPT | Performed by: NURSE PRACTITIONER

## 2024-06-24 NOTE — PROGRESS NOTES
Marielena Hirsch is a 74 y.o. female (: 1950) presenting to address:    Chief Complaint   Patient presents with    Follow-up     6 month f/u       Vitals:    24 0929   BP: 138/84   Pulse: 73   Resp: 16   Temp: 97.5 °F (36.4 °C)   SpO2: 99%       Coordination of Care Questionaire:   1. \"Have you been to the ER, urgent care clinic since your last visit?  Hospitalized since your last visit?\" no    2. \"Have you seen or consulted any other health care providers outside of the [facility] since your last visit?\" no     3. For patients aged 45-75: Has the patient had a colonoscopy / FIT/ Cologuard? yes      If the patient is female:    4. For patients aged 40-74: Has the patient had a mammogram within the past 2 years? yes      5. For patients aged 21-65: Has the patient had a pap smear? na    Advanced Directive:   1. Do you have an Advanced Directive? yes    2. Would you like information on Advanced Directives? no

## 2024-06-24 NOTE — PATIENT INSTRUCTIONS
Patient instructions:  -[facility] -- scheduling will call you  -continue carbidopa-levodopa (Sinemet)  -please have lab obtained (vit B12, folate) with next lab draw  -hydrate with fluids, wear compression stockings, take time when getting up/standing  -call cardiologist to schedule follow-up appt: [phone]  Dr. Bell

## 2024-06-24 NOTE — PROGRESS NOTES
[facility]  [address], [place] [address]  Office:  [phone]  Fax: [phone]  Chief Complaint   Patient presents with    Follow-up     6 month f/u       HPI: Marielena Hirsch presents in follow-up for Parkinson's disease.  She was last seen here on 12/20/2023.  She continued Sinemet  mg 1 tab 4 times daily.  Previously she was on Requip for RLS but now off the medication.  Advised to let me know if interested in neuropsychology evaluation due to cognitive complaints/mild short-term memory difficulties.  Check vitamin B12 and folate with next lab draw.  Referred to PT for balance.      She presents in follow-up.  She states she thinks she has ET instead of PD.  She continues Sinemet 4 times a day.  She does not think it seems to help.  Continues to have tremor.  It is bothersome.  Eating in a restaurant is embarrassing.  Walking: she reports doing pretty good, very conscious of her walking, to make sure she does not fall or trip.  Endorses she feels like her balance is off.  Endorses feeling dizzy/lightheaded when standing, like when getting out of the car.  Feels like she will pass out.  Dizziness when going up the steps.  Reports drinking a lot of water.  Sometimes wears the compression stockings.  Sleeps well.  Reports  says she sometimes puts her hands in the air and starts yelling at people in sleep.  Hallucinations: sometimes sees a little girl sitting in the chair in her bedroom.  Denies stiffness, but gets cramping in the calf, usually just the right leg, it is painful, at night.  Denies swallowing problem.  Endorses drooling.  Denies pain except from R leg cramps.  Endorses still having short term memory problems.  She does not drive.  She believes her sister has ET.        From previous encounter 12/20/2023:  \"HPI: Marielena Hirsch presents in follow-up for Parkinson's disease.  She was last seen here on 3/15/2023.  She was now taking Sinemet 1

## 2024-06-26 ENCOUNTER — HOSPITAL ENCOUNTER (OUTPATIENT)
Facility: HOSPITAL | Age: 74
Discharge: HOME | End: 2024-06-29
Payer: MEDICARE

## 2024-06-26 DIAGNOSIS — R73.01 IMPAIRED FASTING GLUCOSE: ICD-10-CM

## 2024-06-26 DIAGNOSIS — D64.9 ANEMIA, UNSPECIFIED TYPE: ICD-10-CM

## 2024-06-26 LAB
EST. AVERAGE GLUCOSE BLD GHB EST-MCNC: 117 MG/DL
HBA1C MFR BLD: 5.7 % (ref 4.2–5.6)
HGB BLD-MCNC: 11.3 G/DL (ref 12–16)

## 2024-06-26 PROCEDURE — 83036 HEMOGLOBIN GLYCOSYLATED A1C: CPT

## 2024-06-26 PROCEDURE — 85018 HEMOGLOBIN: CPT

## 2024-06-26 PROCEDURE — 36415 COLL VENOUS BLD VENIPUNCTURE: CPT

## 2024-07-08 NOTE — PATIENT INSTRUCTIONS

## 2024-07-08 NOTE — PROGRESS NOTES
\"Have you been to the ER, urgent care clinic since your last visit?  Hospitalized since your last visit?\"    NO    “Have you seen or consulted any other health care providers outside of [facility] since your last visit?”    NO            Click Here for Release of Records Request

## 2024-07-09 ENCOUNTER — OFFICE VISIT (OUTPATIENT)
Facility: CLINIC | Age: 74
End: 2024-07-09
Payer: MEDICARE

## 2024-07-09 VITALS
BODY MASS INDEX: 20.92 KG/M2 | WEIGHT: 138 LBS | OXYGEN SATURATION: 100 % | DIASTOLIC BLOOD PRESSURE: 80 MMHG | TEMPERATURE: 97.9 F | HEIGHT: 68 IN | RESPIRATION RATE: 16 BRPM | SYSTOLIC BLOOD PRESSURE: 124 MMHG | HEART RATE: 92 BPM

## 2024-07-09 DIAGNOSIS — G20.A1 PARKINSON'S DISEASE, UNSPECIFIED WHETHER DYSKINESIA PRESENT, UNSPECIFIED WHETHER MANIFESTATIONS FLUCTUATE (HCC): ICD-10-CM

## 2024-07-09 DIAGNOSIS — R00.2 PALPITATIONS: ICD-10-CM

## 2024-07-09 DIAGNOSIS — M17.12 ARTHRITIS OF LEFT KNEE: ICD-10-CM

## 2024-07-09 DIAGNOSIS — R35.0 URINARY FREQUENCY: ICD-10-CM

## 2024-07-09 DIAGNOSIS — R73.01 IMPAIRED FASTING GLUCOSE: ICD-10-CM

## 2024-07-09 DIAGNOSIS — I10 ESSENTIAL (PRIMARY) HYPERTENSION: Primary | ICD-10-CM

## 2024-07-09 DIAGNOSIS — E78.5 HYPERLIPIDEMIA, UNSPECIFIED HYPERLIPIDEMIA TYPE: ICD-10-CM

## 2024-07-09 DIAGNOSIS — D64.9 ANEMIA, UNSPECIFIED TYPE: ICD-10-CM

## 2024-07-09 PROCEDURE — 3079F DIAST BP 80-89 MM HG: CPT | Performed by: FAMILY MEDICINE

## 2024-07-09 PROCEDURE — 3074F SYST BP LT 130 MM HG: CPT | Performed by: FAMILY MEDICINE

## 2024-07-09 PROCEDURE — 1123F ACP DISCUSS/DSCN MKR DOCD: CPT | Performed by: FAMILY MEDICINE

## 2024-07-09 PROCEDURE — 99214 OFFICE O/P EST MOD 30 MIN: CPT | Performed by: FAMILY MEDICINE

## 2024-07-09 RX ORDER — VALSARTAN AND HYDROCHLOROTHIAZIDE 160; 12.5 MG/1; MG/1
1 TABLET, FILM COATED ORAL DAILY
Qty: 90 TABLET | Refills: 1 | Status: SHIPPED | OUTPATIENT
Start: 2024-07-09

## 2024-07-09 SDOH — ECONOMIC STABILITY: INCOME INSECURITY: HOW HARD IS IT FOR YOU TO PAY FOR THE VERY BASICS LIKE FOOD, HOUSING, MEDICAL CARE, AND HEATING?: NOT HARD AT ALL

## 2024-07-09 SDOH — ECONOMIC STABILITY: FOOD INSECURITY: WITHIN THE PAST 12 MONTHS, THE FOOD YOU BOUGHT JUST DIDN'T LAST AND YOU DIDN'T HAVE MONEY TO GET MORE.: NEVER TRUE

## 2024-07-09 SDOH — ECONOMIC STABILITY: FOOD INSECURITY: WITHIN THE PAST 12 MONTHS, YOU WORRIED THAT YOUR FOOD WOULD RUN OUT BEFORE YOU GOT MONEY TO BUY MORE.: NEVER TRUE

## 2024-07-09 ASSESSMENT — PATIENT HEALTH QUESTIONNAIRE - PHQ9
SUM OF ALL RESPONSES TO PHQ QUESTIONS 1-9: 0
SUM OF ALL RESPONSES TO PHQ9 QUESTIONS 1 & 2: 0
SUM OF ALL RESPONSES TO PHQ QUESTIONS 1-9: 0
1. LITTLE INTEREST OR PLEASURE IN DOING THINGS: NOT AT ALL
SUM OF ALL RESPONSES TO PHQ QUESTIONS 1-9: 0
2. FEELING DOWN, DEPRESSED OR HOPELESS: NOT AT ALL
SUM OF ALL RESPONSES TO PHQ QUESTIONS 1-9: 0

## 2024-07-09 NOTE — PROGRESS NOTES
SUBJECTIVE  Chief Complaint   Patient presents with    Results     Review of results     Spinal Stenosis     Parkinson Disease    IFG    Hypertension    Cholesterol Problem    Anemia      Patient presents for follow-up    Hypertension -   Taking meds as prescribed.  .  Has seen cardiology and has had one visit last year for palpitations.   She canceled her annual follow-up as this is not as much of a concern for her.  Most recently seen for palpitations and no work-up as of yet due to rarity of symptoms.   Does report some dizziness and wonders if BP related.  Not clearly orthostatic.    Pre-DM -   Latest A1c with minimal elevation.    Patient continues to work on maintaining a healthy weight through diet and regular exercise      Hyperlipidemia -  In the past patient reported poor compliance with Crestor 20 mg qhs. Her father had a stroke in his late 60s.  Her brother had an MI in his 40s.  She denies any cardiac or neuro symptoms to suggest either.     Parkinson Disease -   Followed by neurology, and is taking Sinemet which improves her tremor and balance difficulties.   No falls.      Spinal Stenosis -   No complaints.    Was seeing Dr. Barker at [place]    REVIEW OF SYSTEMS:  Respiratory: Negative for cough, shortness of breath and wheezing.    Cardiovascular: Negative for chest pain, palpitations and leg swelling.   : chronic nocturia, 3 times per night. History of bladder injury during hysterectomy.  She saw OB/GYN who started with behavioral mods like not drinking near bedtime.      OBJECTIVE  Blood pressure 124/80, pulse 92, temperature 97.9 °F (36.6 °C), temperature source Temporal, resp. rate 16, height 1.727 m (5' 8\"), weight 62.6 kg (138 lb), SpO2 100 %.   General:  Alert, cooperative, well appearing, in no apparent distress.  Chest:  Clear, no w/r/r.  Heart:  Normal S1S2, RRR, no murmurs.  Ext: no swelling  Psych: normal affect.  Mood good.  Oriented x 3.  Judgement and insight intact.      Latest

## 2024-07-10 DIAGNOSIS — I10 ESSENTIAL (PRIMARY) HYPERTENSION: ICD-10-CM

## 2024-07-10 RX ORDER — VALSARTAN AND HYDROCHLOROTHIAZIDE 160; 12.5 MG/1; MG/1
1 TABLET, FILM COATED ORAL DAILY
Qty: 90 TABLET | Refills: 1 | OUTPATIENT
Start: 2024-07-10

## 2024-07-10 NOTE — TELEPHONE ENCOUNTER
Discussed with patient.  She was to walk in the written prescription we gave her so that they would cancel the old dose.

## 2024-07-10 NOTE — TELEPHONE ENCOUNTER
Pt's RX   valsartan-hydroCHLOROthiazide (DIOVAN HCT) 160-12.5 MG per tablet  QTY 90 REFILL 1 was not sent to the pharmacy. Can you please resend to [facility] [address]. Please advise.

## 2024-07-11 NOTE — TELEPHONE ENCOUNTER
Spoke with Mrs. Vikki Hirsch to reminder that a written RX was given for her to walk in the written prescription we gave her so that they would cancel the old dose. Mrs. Hirsch voice understanding

## 2024-08-09 ENCOUNTER — TRANSCRIBE ORDERS (OUTPATIENT)
Facility: HOSPITAL | Age: 74
End: 2024-08-09

## 2024-08-09 DIAGNOSIS — Z12.31 SCREENING MAMMOGRAM FOR HIGH-RISK PATIENT: Primary | ICD-10-CM

## 2024-09-03 ENCOUNTER — HOSPITAL ENCOUNTER (OUTPATIENT)
Facility: HOSPITAL | Age: 74
Discharge: HOME | End: 2024-09-06
Attending: FAMILY MEDICINE
Payer: MEDICARE

## 2024-09-03 VITALS — WEIGHT: 138.01 LBS | HEIGHT: 68 IN | BODY MASS INDEX: 20.92 KG/M2

## 2024-09-03 DIAGNOSIS — Z12.31 SCREENING MAMMOGRAM FOR HIGH-RISK PATIENT: ICD-10-CM

## 2024-09-03 PROCEDURE — 77063 BREAST TOMOSYNTHESIS BI: CPT

## 2024-09-09 ENCOUNTER — TELEPHONE (OUTPATIENT)
Facility: CLINIC | Age: 74
End: 2024-09-09

## 2024-12-10 ENCOUNTER — TELEPHONE (OUTPATIENT)
Age: 74
End: 2024-12-10

## 2024-12-10 NOTE — TELEPHONE ENCOUNTER
Patient called the nurse line requesting a call back.  Nurse returned call  verifying name and  of patient.  Patient shared with nurse in her last visit she was asked if she was having any hallucinations, patient stated she was not.  Now since last week patient and family has noticed she is hallucinating seeing her sister in her home and now this week she is seeing a little puppy in her bedroom.  Patient states that the images she is seeing is very real but her  is assuring her that what she is seeing is not really there.  Patient states she is taking all her medication as she should and wants NP Aiken to be made aware of what is taking place.  NP Aiken made aware.

## 2025-01-10 ENCOUNTER — HOSPITAL ENCOUNTER (OUTPATIENT)
Facility: HOSPITAL | Age: 75
Discharge: HOME | End: 2025-01-13
Payer: MEDICARE

## 2025-01-10 DIAGNOSIS — E78.5 HYPERLIPIDEMIA, UNSPECIFIED HYPERLIPIDEMIA TYPE: ICD-10-CM

## 2025-01-10 DIAGNOSIS — D64.9 ANEMIA, UNSPECIFIED TYPE: ICD-10-CM

## 2025-01-10 DIAGNOSIS — R73.01 IMPAIRED FASTING GLUCOSE: ICD-10-CM

## 2025-01-10 DIAGNOSIS — I10 ESSENTIAL (PRIMARY) HYPERTENSION: ICD-10-CM

## 2025-01-10 LAB
ALBUMIN SERPL-MCNC: 4.5 G/DL (ref 3.4–5)
ALBUMIN/GLOB SERPL: 1.4 (ref 0.8–1.7)
ALP SERPL-CCNC: 101 U/L (ref 45–117)
ALT SERPL-CCNC: 34 U/L (ref 13–56)
ANION GAP SERPL CALC-SCNC: 6 MMOL/L (ref 3–18)
AST SERPL-CCNC: 17 U/L (ref 10–38)
BASOPHILS # BLD: 0.03 K/UL (ref 0–0.1)
BASOPHILS NFR BLD: 0.5 % (ref 0–2)
BILIRUB SERPL-MCNC: 1 MG/DL (ref 0.2–1)
BUN SERPL-MCNC: 20 MG/DL (ref 7–18)
BUN/CREAT SERPL: 18 (ref 12–20)
CALCIUM SERPL-MCNC: 9.7 MG/DL (ref 8.5–10.1)
CHLORIDE SERPL-SCNC: 108 MMOL/L (ref 100–111)
CHOLEST SERPL-MCNC: 244 MG/DL
CO2 SERPL-SCNC: 27 MMOL/L (ref 21–32)
CREAT SERPL-MCNC: 1.09 MG/DL (ref 0.6–1.3)
DIFFERENTIAL METHOD BLD: ABNORMAL
EOSINOPHIL # BLD: 0.03 K/UL (ref 0–0.4)
EOSINOPHIL NFR BLD: 0.5 % (ref 0–5)
ERYTHROCYTE [DISTWIDTH] IN BLOOD BY AUTOMATED COUNT: 13.6 % (ref 11.6–14.5)
EST. AVERAGE GLUCOSE BLD GHB EST-MCNC: 120 MG/DL
FERRITIN SERPL-MCNC: 241 NG/ML (ref 8–388)
GLOBULIN SER CALC-MCNC: 3.3 G/DL (ref 2–4)
GLUCOSE SERPL-MCNC: 98 MG/DL (ref 74–99)
HBA1C MFR BLD: 5.8 % (ref 4.2–5.6)
HCT VFR BLD AUTO: 37.8 % (ref 35–45)
HDLC SERPL-MCNC: 95 MG/DL (ref 40–60)
HDLC SERPL: 2.6 (ref 0–5)
HGB BLD-MCNC: 12.5 G/DL (ref 12–16)
IMM GRANULOCYTES # BLD AUTO: 0.01 K/UL (ref 0–0.04)
IMM GRANULOCYTES NFR BLD AUTO: 0.2 % (ref 0–0.5)
LDLC SERPL CALC-MCNC: 138.4 MG/DL (ref 0–100)
LIPID PANEL: ABNORMAL
LYMPHOCYTES # BLD: 1.89 K/UL (ref 0.9–3.6)
LYMPHOCYTES NFR BLD: 34.2 % (ref 21–52)
MCH RBC QN AUTO: 31.4 PG (ref 24–34)
MCHC RBC AUTO-ENTMCNC: 33.1 G/DL (ref 31–37)
MCV RBC AUTO: 95 FL (ref 78–100)
MONOCYTES # BLD: 0.26 K/UL (ref 0.05–1.2)
MONOCYTES NFR BLD: 4.7 % (ref 3–10)
NEUTS SEG # BLD: 3.31 K/UL (ref 1.8–8)
NEUTS SEG NFR BLD: 59.9 % (ref 40–73)
NRBC # BLD: 0 K/UL (ref 0–0.01)
NRBC BLD-RTO: 0 PER 100 WBC
PLATELET # BLD AUTO: 164 K/UL (ref 135–420)
PMV BLD AUTO: 11.8 FL (ref 9.2–11.8)
POTASSIUM SERPL-SCNC: 3.9 MMOL/L (ref 3.5–5.5)
PROT SERPL-MCNC: 7.8 G/DL (ref 6.4–8.2)
RBC # BLD AUTO: 3.98 M/UL (ref 4.2–5.3)
SODIUM SERPL-SCNC: 141 MMOL/L (ref 136–145)
TRIGL SERPL-MCNC: 53 MG/DL
VLDLC SERPL CALC-MCNC: 10.6 MG/DL
WBC # BLD AUTO: 5.5 K/UL (ref 4.6–13.2)

## 2025-01-10 PROCEDURE — 82728 ASSAY OF FERRITIN: CPT

## 2025-01-10 PROCEDURE — 83036 HEMOGLOBIN GLYCOSYLATED A1C: CPT

## 2025-01-10 PROCEDURE — 36415 COLL VENOUS BLD VENIPUNCTURE: CPT

## 2025-01-10 PROCEDURE — 80061 LIPID PANEL: CPT

## 2025-01-10 PROCEDURE — 85025 COMPLETE CBC W/AUTO DIFF WBC: CPT

## 2025-01-10 PROCEDURE — 80053 COMPREHEN METABOLIC PANEL: CPT

## 2025-01-10 NOTE — PROGRESS NOTES
Chief Complaint   Patient presents with    Medicare AWV      \"Have you been to the ER, urgent care clinic since your last visit?  Hospitalized since your last visit?\"    NO    “Have you seen or consulted any other health care providers outside our system since your last visit?”    NO    No updated immunizations noted on [place] Immunization  Registry as of 01/10/2025

## 2025-01-10 NOTE — PATIENT INSTRUCTIONS
one step closer.  Not feeling your best.  Start with 5 minutes of an activity you enjoy. Prove to yourself you can do it. As you get comfortable, increase your time.  You may not be where you want to be. But you're in the process of getting there. Everyone starts somewhere.  How can you find safe ways to stay active?  Talk with your doctor about any physical challenges you're facing. Make a plan with your doctor if you have a health problem or aren't sure how to get started with activity.  If you're already active, ask your doctor if there is anything you should change to stay safe as your body and health change.  If you tend to feel dizzy after you take medicine, avoid activity at that time. Try being active before you take your medicine. This will reduce your risk of falls.  If you plan to be active at home, make sure to clear your space before you get started. Remove things like TV cords, coffee tables, and throw rugs. It's safest to have plenty of space to move freely.  The key to getting more active is to take it slow and steady. Try to improve only a little bit at a time. Pick just one area to improve on at first. And if an activity hurts, stop and talk to your doctor.  Where can you learn more?  Go to https://www.[employer].net/patientEd and enter P600 to learn more about \"Learning About Being Active as an Older Adult.\"  Current as of: July 31, 2024  Content Version: 14.3  © 2024 [employer].   Care instructions adapted under license by [email or web address]. If you have questions about a medical condition or this instruction, always ask your healthcare professional. [employer] disclaims any warranty or liability for your use of this information.         Advance Directives: Care Instructions  Overview  An advance directive is a legal way to state your wishes at the end of your life. It tells your family and your doctor what to do if you can't say what you want.  There are two main types of

## 2025-01-13 ENCOUNTER — OFFICE VISIT (OUTPATIENT)
Facility: CLINIC | Age: 75
End: 2025-01-13
Payer: MEDICARE

## 2025-01-13 VITALS
HEIGHT: 68 IN | DIASTOLIC BLOOD PRESSURE: 66 MMHG | WEIGHT: 135 LBS | BODY MASS INDEX: 20.46 KG/M2 | SYSTOLIC BLOOD PRESSURE: 104 MMHG | OXYGEN SATURATION: 98 % | HEART RATE: 63 BPM | TEMPERATURE: 98 F | RESPIRATION RATE: 16 BRPM

## 2025-01-13 VITALS
SYSTOLIC BLOOD PRESSURE: 104 MMHG | OXYGEN SATURATION: 98 % | DIASTOLIC BLOOD PRESSURE: 66 MMHG | BODY MASS INDEX: 20.46 KG/M2 | WEIGHT: 135 LBS | HEART RATE: 63 BPM | TEMPERATURE: 98 F | RESPIRATION RATE: 16 BRPM | HEIGHT: 68 IN

## 2025-01-13 DIAGNOSIS — G20.A1 PARKINSON'S DISEASE, UNSPECIFIED WHETHER DYSKINESIA PRESENT, UNSPECIFIED WHETHER MANIFESTATIONS FLUCTUATE (HCC): ICD-10-CM

## 2025-01-13 DIAGNOSIS — R73.01 IMPAIRED FASTING GLUCOSE: ICD-10-CM

## 2025-01-13 DIAGNOSIS — Z12.11 SCREEN FOR COLON CANCER: ICD-10-CM

## 2025-01-13 DIAGNOSIS — Z00.00 MEDICARE ANNUAL WELLNESS VISIT, SUBSEQUENT: Primary | ICD-10-CM

## 2025-01-13 DIAGNOSIS — I10 ESSENTIAL (PRIMARY) HYPERTENSION: Primary | ICD-10-CM

## 2025-01-13 DIAGNOSIS — R00.2 PALPITATIONS: ICD-10-CM

## 2025-01-13 DIAGNOSIS — M85.80 OSTEOPENIA, UNSPECIFIED LOCATION: ICD-10-CM

## 2025-01-13 DIAGNOSIS — M17.12 ARTHRITIS OF LEFT KNEE: ICD-10-CM

## 2025-01-13 DIAGNOSIS — M48.061 SPINAL STENOSIS, LUMBAR REGION, WITHOUT NEUROGENIC CLAUDICATION: ICD-10-CM

## 2025-01-13 DIAGNOSIS — D64.9 ANEMIA, UNSPECIFIED TYPE: ICD-10-CM

## 2025-01-13 DIAGNOSIS — E78.5 HYPERLIPIDEMIA, UNSPECIFIED HYPERLIPIDEMIA TYPE: ICD-10-CM

## 2025-01-13 DIAGNOSIS — Z71.89 ACP (ADVANCE CARE PLANNING): ICD-10-CM

## 2025-01-13 PROCEDURE — 99214 OFFICE O/P EST MOD 30 MIN: CPT | Performed by: FAMILY MEDICINE

## 2025-01-13 PROCEDURE — 99497 ADVNCD CARE PLAN 30 MIN: CPT | Performed by: FAMILY MEDICINE

## 2025-01-13 PROCEDURE — G0439 PPPS, SUBSEQ VISIT: HCPCS | Performed by: FAMILY MEDICINE

## 2025-01-13 PROCEDURE — 1123F ACP DISCUSS/DSCN MKR DOCD: CPT | Performed by: FAMILY MEDICINE

## 2025-01-13 PROCEDURE — 3078F DIAST BP <80 MM HG: CPT | Performed by: FAMILY MEDICINE

## 2025-01-13 PROCEDURE — 1159F MED LIST DOCD IN RCRD: CPT | Performed by: FAMILY MEDICINE

## 2025-01-13 PROCEDURE — 3074F SYST BP LT 130 MM HG: CPT | Performed by: FAMILY MEDICINE

## 2025-01-13 PROCEDURE — 1126F AMNT PAIN NOTED NONE PRSNT: CPT | Performed by: FAMILY MEDICINE

## 2025-01-13 PROCEDURE — 1160F RVW MEDS BY RX/DR IN RCRD: CPT | Performed by: FAMILY MEDICINE

## 2025-01-13 RX ORDER — VALSARTAN AND HYDROCHLOROTHIAZIDE 160; 12.5 MG/1; MG/1
1 TABLET, FILM COATED ORAL DAILY
Qty: 90 TABLET | Refills: 1 | Status: CANCELLED | OUTPATIENT
Start: 2025-01-13

## 2025-01-13 RX ORDER — AMLODIPINE BESYLATE 5 MG/1
5 TABLET ORAL DAILY
Qty: 90 TABLET | Refills: 1 | Status: SHIPPED | OUTPATIENT
Start: 2025-01-13

## 2025-01-13 RX ORDER — VALSARTAN 160 MG/1
160 TABLET ORAL DAILY
Qty: 90 TABLET | Refills: 1 | Status: SHIPPED | OUTPATIENT
Start: 2025-01-13

## 2025-01-13 SDOH — ECONOMIC STABILITY: FOOD INSECURITY: WITHIN THE PAST 12 MONTHS, YOU WORRIED THAT YOUR FOOD WOULD RUN OUT BEFORE YOU GOT MONEY TO BUY MORE.: NEVER TRUE

## 2025-01-13 SDOH — ECONOMIC STABILITY: FOOD INSECURITY: WITHIN THE PAST 12 MONTHS, THE FOOD YOU BOUGHT JUST DIDN'T LAST AND YOU DIDN'T HAVE MONEY TO GET MORE.: NEVER TRUE

## 2025-01-13 ASSESSMENT — PATIENT HEALTH QUESTIONNAIRE - PHQ9
SUM OF ALL RESPONSES TO PHQ QUESTIONS 1-9: 0
1. LITTLE INTEREST OR PLEASURE IN DOING THINGS: NOT AT ALL
2. FEELING DOWN, DEPRESSED OR HOPELESS: NOT AT ALL
SUM OF ALL RESPONSES TO PHQ QUESTIONS 1-9: 0
SUM OF ALL RESPONSES TO PHQ9 QUESTIONS 1 & 2: 0
SUM OF ALL RESPONSES TO PHQ QUESTIONS 1-9: 0
SUM OF ALL RESPONSES TO PHQ QUESTIONS 1-9: 0

## 2025-01-13 NOTE — PATIENT INSTRUCTIONS

## 2025-01-13 NOTE — PROGRESS NOTES
Chief Complaint   Patient presents with    Medicare AWV Medicare Annual Wellness Visit    Marielena Hirsch is here for Medicare AWV    Assessment & Plan   Medicare annual wellness visit, subsequent  ACP (advance care planning)  -     [place] Advanced Care Planning (16-30 minutes) [46162]     Reviewed ACP, referred for further discussion and completion of ACP.  FIT test ordered.  AWV annually.        Subjective       Patient's complete Health Risk Assessment and screening values have been reviewed and are found in Flowsheets. The following problems were reviewed today and where indicated follow up appointments were made and/or referrals ordered.    Positive Risk Factor Screenings with Interventions:    Fall Risk:  Do you feel unsteady or are you worried about falling? : (!) yes  2 or more falls in past year?: no  Fall with injury in past year?: no     Interventions:    Reviewed medications, home hazards, visual acuity, and co-morbidities that can increase risk for falls  See AVS for additional education material             Inactivity:  On average, how many days per week do you engage in moderate to strenuous exercise (like a brisk walk)?: 0 days (!) Abnormal  On average, how many minutes do you engage in exercise at this level?: 0 min  Interventions:  Counseled           Advanced Directives:  Do you have a Living Will?: (!) No    Intervention:  see ACP note            Objective   Vitals:    01/13/25 0901   BP: 104/66   Site: Left Upper Arm   Position: Sitting   Cuff Size: Large Adult   Pulse: 63   Resp: 16   Temp: 98 °F (36.7 °C)   TempSrc: Skin   SpO2: 98%   Weight: 61.2 kg (135 lb)   Height: 1.727 m (5' 7.99\")      Body mass index is 20.53 kg/m².               No Known Allergies  Prior to Visit Medications    Medication Sig Taking? Authorizing Provider   valsartan-hydroCHLOROthiazide (DIOVAN HCT) 160-12.5 MG per tablet Take 1 tablet by mouth daily  Jayesh Kelly MD   carbidopa-levodopa (SINEMET)  MG per

## 2025-01-13 NOTE — ACP (ADVANCE CARE PLANNING)
Advance Care Planning       Advance Care Planning (ACP) Physician/NP/PA (Provider) Conversation        Date of ACP Conversation: 1/13/2025    Conversation Conducted with:   Patient with Decision Making Capacity    Health Care Decision Maker:    Current Designated Health Care Decision Maker:         Primary Decision Maker: Nikita Hirsch - [facility] - [phone]    Care Preferences:    Awaiting ACP/living will to be shared so we can review in depth.     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      Conversation Outcomes / Follow-Up Plan:   Recommend meeting with an ACP nurse to draft one.   Referral placed.  Review annually.      Length of Voluntary ACP Conversation in minutes:  16 minutes      Jayesh Kelly MD

## 2025-01-13 NOTE — PROGRESS NOTES
Chief Complaint   Patient presents with    Results     Review of results     Anemia    Cholesterol Problem    Hypertension    IFG    Parkison Disease      Along with tremors and dizziness     Spinal Stenosis       \"Have you been to the ER, urgent care clinic since your last visit?  Hospitalized since your last visit?\"    NO    “Have you seen or consulted any other health care providers outside our system since your last visit?”    NO    No updated immunizations noted on [place] Immunization  Registry as of 01/10/2025        Dr. Slater

## 2025-01-13 NOTE — PROGRESS NOTES
SUBJECTIVE  Chief Complaint   Patient presents with    Results     Review of results     Cholesterol Problem    Hypertension    IFG    Parkison Disease      Along with tremors and dizziness       Patient presents for follow-up    Hypertension -   Taking meds as prescribed.  .  Has seen cardiology and has had one visit for palpitations.   She canceled her annual follow-up as this is not as much of a concern for her.  She denies any current or recent palpitations.   Does report some dizziness with elevation changes.    Pre-DM -   Latest A1c with minimal elevation.    Patient continues to work on maintaining a healthy weight through diet and regular exercise      Hyperlipidemia -  In the past patient reported poor compliance with Crestor 20 mg qhs. Her father had a stroke in his late 60s.  Her brother had an MI in his 40s.  She denies any cardiac or neuro symptoms to suggest either.     Parkinson Disease -   Followed by neurology, and is taking Sinemet which improves her tremor and balance difficulties.   No falls.      Spinal Stenosis -   No complaints.    Was seeing Dr. Barker at [place]    REVIEW OF SYSTEMS:  Respiratory: Negative for cough, shortness of breath and wheezing.    Cardiovascular: Negative for chest pain, palpitations and leg swelling.   : chronic nocturia, 3 times per night. History of bladder injury during hysterectomy.  She saw OB/GYN who started with behavioral mods like not drinking near bedtime.      OBJECTIVE  Blood pressure 104/66, pulse 63, temperature 98 °F (36.7 °C), temperature source Skin, resp. rate 16, height 1.727 m (5' 7.99\"), weight 61.2 kg (135 lb), SpO2 98%.   General:  Alert, cooperative, well appearing, in no apparent distress.  Chest:  Clear, no w/r/r.  Heart:  Normal S1S2, RRR, no murmurs.  Ext: no swelling  Psych: normal affect.  Mood good.  Oriented x 3.  Judgement and insight intact.      Latest Reference Range & Units 01/10/25 09:50   Sodium 136 - 145 mmol/L 141   Potassium

## 2025-01-14 ENCOUNTER — CLINICAL DOCUMENTATION (OUTPATIENT)
Dept: SPIRITUAL SERVICES | Age: 75
End: 2025-01-14

## 2025-01-14 NOTE — PROGRESS NOTES
Advance Care Planning   Ambulatory ACP Specialist Patient Outreach    Date:  1/14/2025    ACP Specialist:  Cristela La    Outreach call to patient in follow-up to ACP Specialist referral from:Jayesh Kelly MD    [x] PCP  [] Provider   [] Ambulatory Care Management [] Other     For:                  [x] Advance Directive Assistance              [] Complete Portable DNR order              [] Complete POST/POLST/MOST              [] Code Status Discussion             [] Discuss Goals of Care             [] Early ACP Decision-Making              [] Other (Specify)    Date Referral Received:1/13/25    Next Step:   [] ACP scheduled conversation  [x] Outreach again in one week               [] Email / Mail ACP Info Sheets  [] Email / Mail Advance Directive   [] Closing referral.  Routing closure to referring provider/staff and to ACP Specialist .    [] Closure letter mailed to patient with invitation to contact ACP Specialist if / when ready.   [] Other (Specify here):       [x] At this time, Healthcare Decision Maker Is:   Primary Decision Maker: Nikita Hirsch - Spouse - [phone]    Secondary Decision Maker: [employer] - Child - [phone]         [] Primary agent named in scanned advance directive.    [x] Legal Next of Kin.     [] Unable to determine legal decision maker at this time.    Outreaches:         [x] 1st -  Date:  1/14/25               Intervention:  [] Spoke with Patient   [x] Left Voice mail [] Email / Mail    [] [employer]  [] Other (Specify) :     Outcomes:  Outreach phone call to the patient on mobile phone number which is also listed as the home number.  Provided contact information on [employer] requesting a return call.  Will attempt to follow up in one week.           [] 2nd -  Date:                 Intervention:  [] Spoke with Patient  [] Left Voice mail [] Email / Mail    [] MyChart  [] Other (Specify) :              Outcomes:                [] 3rd -  Date:

## 2025-01-21 ENCOUNTER — CLINICAL DOCUMENTATION (OUTPATIENT)
Dept: SPIRITUAL SERVICES | Age: 75
End: 2025-01-21

## 2025-01-31 ENCOUNTER — CLINICAL DOCUMENTATION (OUTPATIENT)
Dept: SPIRITUAL SERVICES | Age: 75
End: 2025-01-31

## 2025-02-03 ENCOUNTER — TELEPHONE (OUTPATIENT)
Facility: CLINIC | Age: 75
End: 2025-02-03

## 2025-02-03 NOTE — TELEPHONE ENCOUNTER
Incoming call received from Ms. Marielena Hirsch  and wants the nurse to give her a call about her labs results.

## 2025-02-03 NOTE — TELEPHONE ENCOUNTER
Left message via voicemail for Mrs. Hirsch to call [facility] back at [phone] option #1

## 2025-04-11 ENCOUNTER — TELEPHONE (OUTPATIENT)
Facility: CLINIC | Age: 75
End: 2025-04-11

## 2025-04-25 ENCOUNTER — TELEPHONE (OUTPATIENT)
Facility: CLINIC | Age: 75
End: 2025-04-25

## 2025-04-25 NOTE — TELEPHONE ENCOUNTER
Pt is requesting the fit results for the past 2 times please mail to her   [address] [facility] [phone] . Advised that he nurse would not be in the office till 4/28/2025. Thank you

## 2025-07-10 NOTE — PATIENT INSTRUCTIONS
Patient Education        A Healthy Lifestyle: Care Instructions  A healthy lifestyle can help you feel good, have more energy, and stay at a weight that's healthy for you. You can share a healthy lifestyle with your friends and family. And you can do it on your own.    Eat meals with your friends or family. You could try cooking together.   Plan activities with other people. Go for a walk with a friend, try a free online fitness class, or join a sports league.     Eat a variety of healthy foods. These include fruits, vegetables, whole grains, low-fat dairy, and lean protein.   Choose healthy portions of food. You can use the Nutrition Facts label on food packages as a guide.     Eat more fruits and vegetables. You could add vegetables to sandwiches or add fruit to cereal.   Drink water when you are thirsty. Limit soda, juice, and sports drinks.     Try to exercise most days. Aim for at least 2½ hours of exercise each week.   Keep moving. Work in the garden or take your dog on a walk. Use the stairs instead of the elevator.     If you use tobacco or nicotine, try to quit. Ask your doctor about programs and medicines to help you quit.   Limit alcohol. Men should have no more than 2 drinks a day. Women should have no more than 1. For some people, no alcohol is the best choice.   Follow-up care is a key part of your treatment and safety. Be sure to make and go to all appointments, and call your doctor if you are having problems. It's also a good idea to know your test results and keep a list of the medicines you take.  Where can you learn more?  Go to https://www.health[employer].net/patientEd and enter U807 to learn more about \"A Healthy Lifestyle: Care Instructions.\"  Current as of: April 30, 2024  Content Version: 14.5  © 1870-1526 [facility] [employer].   Care instructions adapted under license by [employer]. If you have questions about a medical condition or this instruction, always ask your healthcare professional.

## 2025-07-14 ENCOUNTER — OFFICE VISIT (OUTPATIENT)
Facility: CLINIC | Age: 75
End: 2025-07-14
Payer: MEDICARE

## 2025-07-14 VITALS
OXYGEN SATURATION: 98 % | HEART RATE: 75 BPM | RESPIRATION RATE: 16 BRPM | TEMPERATURE: 98.2 F | WEIGHT: 136.6 LBS | DIASTOLIC BLOOD PRESSURE: 70 MMHG | HEIGHT: 68 IN | SYSTOLIC BLOOD PRESSURE: 130 MMHG | BODY MASS INDEX: 20.7 KG/M2

## 2025-07-14 DIAGNOSIS — E78.5 HYPERLIPIDEMIA, UNSPECIFIED HYPERLIPIDEMIA TYPE: ICD-10-CM

## 2025-07-14 DIAGNOSIS — R44.3 HALLUCINATION: ICD-10-CM

## 2025-07-14 DIAGNOSIS — D64.9 ANEMIA, UNSPECIFIED TYPE: ICD-10-CM

## 2025-07-14 DIAGNOSIS — G20.A1 PARKINSON'S DISEASE, UNSPECIFIED WHETHER DYSKINESIA PRESENT, UNSPECIFIED WHETHER MANIFESTATIONS FLUCTUATE (HCC): ICD-10-CM

## 2025-07-14 DIAGNOSIS — I10 ESSENTIAL (PRIMARY) HYPERTENSION: Primary | ICD-10-CM

## 2025-07-14 DIAGNOSIS — M85.80 OSTEOPENIA, UNSPECIFIED LOCATION: ICD-10-CM

## 2025-07-14 DIAGNOSIS — I10 ESSENTIAL (PRIMARY) HYPERTENSION: ICD-10-CM

## 2025-07-14 DIAGNOSIS — R73.01 IMPAIRED FASTING GLUCOSE: ICD-10-CM

## 2025-07-14 DIAGNOSIS — M17.12 ARTHRITIS OF LEFT KNEE: ICD-10-CM

## 2025-07-14 DIAGNOSIS — R00.2 PALPITATIONS: ICD-10-CM

## 2025-07-14 DIAGNOSIS — Z12.11 COLON CANCER SCREENING: ICD-10-CM

## 2025-07-14 DIAGNOSIS — M48.061 SPINAL STENOSIS, LUMBAR REGION, WITHOUT NEUROGENIC CLAUDICATION: ICD-10-CM

## 2025-07-14 LAB — HBA1C MFR BLD: 5.8 %

## 2025-07-14 PROCEDURE — 1160F RVW MEDS BY RX/DR IN RCRD: CPT | Performed by: FAMILY MEDICINE

## 2025-07-14 PROCEDURE — 1159F MED LIST DOCD IN RCRD: CPT | Performed by: FAMILY MEDICINE

## 2025-07-14 PROCEDURE — 1126F AMNT PAIN NOTED NONE PRSNT: CPT | Performed by: FAMILY MEDICINE

## 2025-07-14 PROCEDURE — 83036 HEMOGLOBIN GLYCOSYLATED A1C: CPT | Performed by: FAMILY MEDICINE

## 2025-07-14 PROCEDURE — 3075F SYST BP GE 130 - 139MM HG: CPT | Performed by: FAMILY MEDICINE

## 2025-07-14 PROCEDURE — 1123F ACP DISCUSS/DSCN MKR DOCD: CPT | Performed by: FAMILY MEDICINE

## 2025-07-14 PROCEDURE — 3078F DIAST BP <80 MM HG: CPT | Performed by: FAMILY MEDICINE

## 2025-07-14 PROCEDURE — 99214 OFFICE O/P EST MOD 30 MIN: CPT | Performed by: FAMILY MEDICINE

## 2025-07-14 RX ORDER — VALSARTAN 160 MG/1
160 TABLET ORAL DAILY
Qty: 90 TABLET | Refills: 1 | Status: SHIPPED | OUTPATIENT
Start: 2025-07-14

## 2025-07-14 RX ORDER — ROSUVASTATIN CALCIUM 20 MG/1
20 TABLET, COATED ORAL NIGHTLY
Qty: 90 TABLET | Refills: 1 | Status: SHIPPED | OUTPATIENT
Start: 2025-07-14

## 2025-07-14 NOTE — PROGRESS NOTES
SUBJECTIVE  Chief Complaint   Patient presents with    Hypertension      Patient presents for follow-up    Hypertension -   Was changed to diovan and norvasc but she has not been taking norvasc.  No more dizziness.    Has seen cardiology and has had one visit for palpitations.   She canceled her annual follow-up as this is not as much of a concern for her.  She has palpitations sometimes.       Pre-DM -   Latest A1c with minimal elevation, stable.  Patient continues to work on maintaining a healthy weight through diet and regular exercise      Hyperlipidemia -  In the past patient reported poor compliance with Crestor 20 mg qhs. Her father had a stroke in his late 60s.  Her brother had an MI in his 40s.  She denies any cardiac or neuro symptoms to suggest either.     Parkinson Disease -   Followed by neurology being 9 months overdue, and is taking Sinemet which improves her tremor and balance difficulties.   No falls.   Has reported hallucinations but did not follow-up with neuro.  They made some recommendations on testing, med adjustments to sinemet and follow-up.  None were pursued by patient.     Spinal Stenosis -   No complaints.    Was seeing Dr. Barker at [place]    REVIEW OF SYSTEMS:  Respiratory: Negative for cough, shortness of breath and wheezing.    Cardiovascular: Negative for chest pain, palpitations and leg swelling.   : chronic nocturia, 3 times per night. History of bladder injury during hysterectomy.  She saw OB/GYN who started with behavioral mods like not drinking near bedtime.      OBJECTIVE  Blood pressure 130/70, pulse 75, temperature 98.2 °F (36.8 °C), temperature source Temporal, resp. rate 16, height 1.727 m (5' 8\"), weight 62 kg (136 lb 9.6 oz), SpO2 98%.   General:  Alert, cooperative, well appearing, in no apparent distress.  Chest:  Clear, no w/r/r.  Heart:  Normal S1S2, RRR, no murmurs.  Ext: no swelling  Psych: normal affect.  Mood good.  Oriented x 3.  Judgement and insight intact.

## 2025-07-14 NOTE — TELEPHONE ENCOUNTER
This patient contacted office for the following prescriptions to be filled:    Medication requested :  rosuvastatin  PCP:  will  Pharmacy or Print:  [facility]  Mail order or Local pharmacy [facility]   Last office visit 7/14/2025  Next office visit 1/19/2026

## 2025-07-14 NOTE — PROGRESS NOTES
Have you been to the ER, urgent care clinic since your last visit?  Hospitalized since your last visit?   NO    Have you seen or consulted any other health care providers outside our system since your last visit?   NO      “Have you had a colorectal cancer screening such as a colonoscopy/FIT/Cologuard?    NO    No colonoscopy on file  No cologuard on file  Date of last FIT: 1/16/2024   No flexible sigmoidoscopy on file

## 2025-07-25 ENCOUNTER — OFFICE VISIT (OUTPATIENT)
Age: 75
End: 2025-07-25
Payer: MEDICARE

## 2025-07-25 VITALS
WEIGHT: 134 LBS | HEIGHT: 68 IN | DIASTOLIC BLOOD PRESSURE: 86 MMHG | SYSTOLIC BLOOD PRESSURE: 133 MMHG | BODY MASS INDEX: 20.31 KG/M2 | HEART RATE: 86 BPM | OXYGEN SATURATION: 99 % | RESPIRATION RATE: 16 BRPM

## 2025-07-25 DIAGNOSIS — R26.89 IMBALANCE: ICD-10-CM

## 2025-07-25 DIAGNOSIS — R41.89 COGNITIVE IMPAIRMENT: ICD-10-CM

## 2025-07-25 DIAGNOSIS — G20.A1 PARKINSON'S DISEASE WITHOUT DYSKINESIA OR FLUCTUATING MANIFESTATIONS (HCC): Primary | ICD-10-CM

## 2025-07-25 DIAGNOSIS — R44.1 VISUAL HALLUCINATIONS: ICD-10-CM

## 2025-07-25 LAB — HEMOCCULT STL QL IA: NEGATIVE

## 2025-07-25 PROCEDURE — 1123F ACP DISCUSS/DSCN MKR DOCD: CPT | Performed by: NURSE PRACTITIONER

## 2025-07-25 PROCEDURE — G8399 PT W/DXA RESULTS DOCUMENT: HCPCS | Performed by: NURSE PRACTITIONER

## 2025-07-25 PROCEDURE — 1036F TOBACCO NON-USER: CPT | Performed by: NURSE PRACTITIONER

## 2025-07-25 PROCEDURE — 1126F AMNT PAIN NOTED NONE PRSNT: CPT | Performed by: NURSE PRACTITIONER

## 2025-07-25 PROCEDURE — 3079F DIAST BP 80-89 MM HG: CPT | Performed by: NURSE PRACTITIONER

## 2025-07-25 PROCEDURE — 1159F MED LIST DOCD IN RCRD: CPT | Performed by: NURSE PRACTITIONER

## 2025-07-25 PROCEDURE — 1160F RVW MEDS BY RX/DR IN RCRD: CPT | Performed by: NURSE PRACTITIONER

## 2025-07-25 PROCEDURE — G8427 DOCREV CUR MEDS BY ELIG CLIN: HCPCS | Performed by: NURSE PRACTITIONER

## 2025-07-25 PROCEDURE — 1090F PRES/ABSN URINE INCON ASSESS: CPT | Performed by: NURSE PRACTITIONER

## 2025-07-25 PROCEDURE — 3075F SYST BP GE 130 - 139MM HG: CPT | Performed by: NURSE PRACTITIONER

## 2025-07-25 PROCEDURE — G8420 CALC BMI NORM PARAMETERS: HCPCS | Performed by: NURSE PRACTITIONER

## 2025-07-25 PROCEDURE — 99215 OFFICE O/P EST HI 40 MIN: CPT | Performed by: NURSE PRACTITIONER

## 2025-07-25 PROCEDURE — 3017F COLORECTAL CA SCREEN DOC REV: CPT | Performed by: NURSE PRACTITIONER

## 2025-07-25 RX ORDER — CARBIDOPA AND LEVODOPA 25; 100 MG/1; MG/1
TABLET ORAL
Qty: 120 TABLET | Refills: 5 | Status: SHIPPED | OUTPATIENT
Start: 2025-07-25

## 2025-07-25 RX ORDER — RIVASTIGMINE 4.6 MG/24H
1 PATCH, EXTENDED RELEASE TRANSDERMAL DAILY
Qty: 90 PATCH | Refills: 1 | Status: SHIPPED | OUTPATIENT
Start: 2025-07-25

## 2025-07-25 NOTE — PATIENT INSTRUCTIONS
Patient instructions:  -start rivastigmine (Exelon) patch 4.6 mg/24 hrs (see instructions below)  -continue Sinemet 1 tab 4 times/day  -neuropsychology evaluation:  [facility]- [phone]  Call them if you don't hear from them in 1 week  -lab work  -PT at [facility]- they should call you      Topical: Apply transdermal patch to upper or lower back (alternatively, may apply to upper arm or chest). Do not use patch if the pouch seal is broken or if the patch is cut, altered, or damaged. Avoid reapplication to same spot of skin for 14 days (eg, may rotate sections of back). Apply to clean, dry, and hairless skin. Patch should be pressed down firmly by applying pressure with the hand over the entire patch for at least 30 seconds, making sure edges stick well. Do not apply to red, irritated, or broken skin. Avoid areas of recent application of lotion or powder. After removal, fold patch to press adhesive surfaces together, place in previously saved pouch, and discard. Avoid eye contact; wash hands after handling patch. Remove old patch and replace with a new patch every 24 hours (at the same time each day). If a dose is missed or if the patch falls off, apply a new patch immediately and replace the following day at the usual application time. Do not use overlays, bandages, or tape to secure a patch that has become loose. Avoid exposing the patch to external sources of heat (eg, sauna, excessive light) for prolonged periods of time. No more than 1 patch should be applied daily and existing patch must be removed prior to applying new patch. Discard any used or unused patches by folding adhesive sides together and dispose of in trash away from children and pets.

## 2025-07-25 NOTE — PROGRESS NOTES
[facility]  [address] [place] [place] [address]  Office:  [phone]  Fax: [phone]  Chief Complaint   Patient presents with    Follow-up       HPI:  Marielena Hirsch presents in follow-up for Parkinson's disease.  She was last seen here on 2024.    She presents in follow-up.  She is with her youngest son.  She reports doing well.  He reports she's been having some hallucinations.  She reports it's not increased.  There was a bout when his brother was staying with her.  Had it when staying with her son here.  She sees people sometimes.  Or can think something is something else; thought some sheets on the bed was a person there.  She reports she sees shapes.  It is not bothersome.  She is calm about it.  It's only about once/month.  Denies recent illness, infection, fever, or UTI symptoms.  Reports her bladder stays full, gets up in the night to void several times, has broken sleep.  Denies vivid dreams or realistic dreams.  Now just visual hallucinations.  The tremor continues.  It's about the same.  Able to do ADLs with her hands.  Denies dizziness with standing.  He says she was dizzy a few times when standing, had anxiety thinking she might fall.  That was a few weeks ago.  Sometimes has constipation.  Denies pain.  Endorses drooling, at night.  Endorses memory problems.   does the cooking, sets up appointments.  Swallowing is ok.  Trips but doesn't fall.  Taking Sinemet 1 tab 4 times per day.  She reports drinking plenty of water.  Did not have lab for vitamin B12 level.  Taking a vitamin B12 pill.  Not driving.     Past Medical History:   Diagnosis Date    Bladder perforation, intraoperative     during hysterectomy    Hyperlipidemia     Hypertension     Osteopenia     Parkinson disease (HCC)     Prediabetes        Past Surgical History:   Procedure Laterality Date     SECTION      EXTRAC ERUPTED TOOTH/EXPOSED ROOT      HYSTERECTOMY (CERVIX STATUS UNKNOWN)

## 2025-07-30 ENCOUNTER — TELEPHONE (OUTPATIENT)
Age: 75
End: 2025-07-30

## 2025-08-01 ENCOUNTER — HOSPITAL ENCOUNTER (OUTPATIENT)
Age: 75
Setting detail: SPECIMEN
Discharge: HOME | End: 2025-08-04

## 2025-08-01 LAB — LABCORP SPECIMEN COLLECTION: NORMAL

## 2025-08-04 LAB — SPECIMEN STATUS REPORT: NORMAL

## 2025-08-05 ENCOUNTER — RESULTS FOLLOW-UP (OUTPATIENT)
Age: 75
End: 2025-08-05

## 2025-08-05 LAB
FOLATE SERPL-MCNC: 8.8 NG/ML
VIT B12 SERPL-MCNC: 479 PG/ML (ref 232–1245)

## 2025-08-14 ENCOUNTER — TRANSCRIBE ORDERS (OUTPATIENT)
Facility: HOSPITAL | Age: 75
End: 2025-08-14

## 2025-08-14 DIAGNOSIS — Z12.31 VISIT FOR SCREENING MAMMOGRAM: Primary | ICD-10-CM
